# Patient Record
Sex: MALE | Race: WHITE | Employment: FULL TIME | URBAN - METROPOLITAN AREA
[De-identification: names, ages, dates, MRNs, and addresses within clinical notes are randomized per-mention and may not be internally consistent; named-entity substitution may affect disease eponyms.]

---

## 2018-05-01 ENCOUNTER — OFFICE VISIT (OUTPATIENT)
Dept: FAMILY MEDICINE CLINIC | Age: 63
End: 2018-05-01

## 2018-05-01 VITALS
OXYGEN SATURATION: 96 % | BODY MASS INDEX: 35.08 KG/M2 | SYSTOLIC BLOOD PRESSURE: 158 MMHG | RESPIRATION RATE: 19 BRPM | HEIGHT: 72 IN | HEART RATE: 68 BPM | DIASTOLIC BLOOD PRESSURE: 85 MMHG | TEMPERATURE: 97.3 F | WEIGHT: 259 LBS

## 2018-05-01 DIAGNOSIS — E78.5 HYPERLIPIDEMIA, UNSPECIFIED HYPERLIPIDEMIA TYPE: ICD-10-CM

## 2018-05-01 DIAGNOSIS — E11.8 TYPE 2 DIABETES MELLITUS WITH COMPLICATION, WITHOUT LONG-TERM CURRENT USE OF INSULIN (HCC): Primary | ICD-10-CM

## 2018-05-01 DIAGNOSIS — I10 ESSENTIAL HYPERTENSION: ICD-10-CM

## 2018-05-01 RX ORDER — CHOLECALCIFEROL (VITAMIN D3) 125 MCG
CAPSULE ORAL
COMMUNITY
Start: 2018-04-16

## 2018-05-01 RX ORDER — LANCETS 33 GAUGE
EACH MISCELLANEOUS
Refills: 1 | COMMUNITY
Start: 2018-02-27

## 2018-05-01 RX ORDER — ATORVASTATIN CALCIUM 20 MG/1
TABLET, FILM COATED ORAL
Refills: 0 | COMMUNITY
Start: 2018-04-18 | End: 2018-05-10 | Stop reason: SDUPTHER

## 2018-05-01 RX ORDER — LISINOPRIL 10 MG/1
TABLET ORAL DAILY
COMMUNITY
End: 2018-05-01 | Stop reason: SDUPTHER

## 2018-05-01 RX ORDER — BLOOD-GLUCOSE METER
KIT MISCELLANEOUS
Refills: 0 | COMMUNITY
Start: 2018-02-26

## 2018-05-01 RX ORDER — BLOOD SUGAR DIAGNOSTIC
STRIP MISCELLANEOUS
Refills: 3 | COMMUNITY
Start: 2018-02-26

## 2018-05-01 RX ORDER — CICLOPIROX 80 MG/ML
SOLUTION TOPICAL AS NEEDED
Refills: 3 | COMMUNITY
Start: 2018-02-26

## 2018-05-01 RX ORDER — GLYBURIDE-METFORMIN HYDROCHLORIDE 5; 500 MG/1; MG/1
1 TABLET ORAL
COMMUNITY
End: 2018-09-04 | Stop reason: SDUPTHER

## 2018-05-01 RX ORDER — LISINOPRIL 10 MG/1
TABLET ORAL
COMMUNITY
Start: 2018-04-16 | End: 2018-05-10 | Stop reason: SDUPTHER

## 2018-05-01 NOTE — PROGRESS NOTES
1. Have you been to the ER, urgent care clinic since your last visit? Hospitalized since your last visit? No    2. Have you seen or consulted any other health care providers outside of the [address] [place] [address] since your last visit? Include any pap smears or colon screening. [address] Chief Complaint   Patient presents with   [address]    Cholesterol Problem    Blood sugar problem    Sleep Apnea    Hypertension     Blood pressure 154/79, pulse 71, temperature 97.3 °F (36.3 °C), temperature source Oral, resp. rate 19, height 6' (1.829 m), weight 259 lb (117.5 kg), SpO2 96 %. Recheck of B/P 158/85, pulse 68.

## 2018-05-01 NOTE — MR AVS SNAPSHOT
[address] [id number] Patient: Elmer Carl MRN: [id number] :1955 Visit Information Date & Time Provider Department Dept. Phone Encounter #  
 2018  9:45 AM Esteban Maguire MD [address] [facility] [phone] [id number] Upcoming Health Maintenance Date Due Hepatitis C Screening 1955 HEMOGLOBIN A1C Q6M 1955 LIPID PANEL Q1 1955 FOOT EXAM Q1 1964 MICROALBUMIN Q1 1964 EYE EXAM RETINAL OR DILATED Q1 1964 Pneumococcal 19-64 Medium Risk (1 of 1 - PPSV23) 1973 DTaP/Tdap/Td series (1 - Tdap) 1975 FOBT Q 1 YEAR AGE 50-75 2004 ZOSTER VACCINE AGE 60> 2014 Influenza Age 5 to Adult 2018 Allergies as of 2018  Review Complete On: 2018 By: Julian Moran LPN No Known Allergies Current Immunizations  Never Reviewed No immunizations on file. Not reviewed this visit You Were Diagnosed With   
  
 Codes Comments Type 2 diabetes mellitus with complication, without long-term current use of insulin (HCC)    -  Primary ICD-10-CM: E11.8 ICD-9-CM: 250.90 Essential hypertension     ICD-10-CM: I10 
ICD-9-CM: 401.9 Hyperlipidemia, unspecified hyperlipidemia type     ICD-10-CM: E78.5 ICD-9-CM: 272.4 Vitals BP Pulse Temp Resp Height(growth percentile) Weight(growth percentile) 158/85 (BP 1 Location: Left arm, BP Patient Position: Sitting) 68 97.3 °F (36.3 °C) (Oral) 19 6' (1.829 m) 259 lb (117.5 kg) SpO2 BMI Smoking Status 96% 35.13 kg/m2 Former Smoker Vitals History BMI and BSA Data Body Mass Index Body Surface Area  
 35.13 kg/m 2 2.44 m 2 Preferred Pharmacy Pharmacy Name Phone [facility] [phone] Your Updated Medication List  
  
   
This list is accurate as of 5/1/18 10:57 AM.  Always use your most recent med list.  
  
  
  
  
 BABY ASPIRIN PO Take 81 mg by mouth daily. COQ10 (LIPOSOMAL UBIQUINOL) PO Take  by mouth. COQ10  PO Take  by mouth. FISH OIL PO Take 1 g by mouth daily. glimepiride 2 mg tablet Commonly known as:  AMARYL Take  by mouth every morning. glyBURIDE-metFORMIN 5-500 mg per tablet Commonly known as:  [place] Take 1 Tab by mouth Daily (before breakfast). LIPITOR 40 mg tablet Generic drug:  atorvastatin Take 20 mg by mouth daily. lisinopril 10 mg tablet Commonly known as:  [place]on Estrin Take  by mouth daily. OMEGA 3 PO Take  by mouth. oxyCODONE-acetaminophen 5-325 mg per tablet Commonly known as:  PERCOCET Take 1 tablet by mouth every four (4) hours as needed for Pain. We Performed the Following CBC W/O DIFF [42292 CPT(R)] LIPID PANEL [61294 CPT(R)] METABOLIC PANEL, COMPREHENSIVE [85202 CPT(R)] Patient Instructions 1. Please make an appointment for a lab only visit for fasting labs 2. Please schedule an appointment ~ 3-4 days after your lab only visit to discuss your labs DASH Diet: Care Instructions Your Care Instructions The DASH diet is an eating plan that can help lower your blood pressure. DASH stands for Dietary Approaches to Stop Hypertension. Hypertension is high blood pressure. The DASH diet focuses on eating foods that are high in calcium, potassium, and magnesium. These nutrients can lower blood pressure. The foods that are highest in these nutrients are fruits, vegetables, low-fat dairy products, nuts, seeds, and legumes. But taking calcium, potassium, and magnesium supplements instead of eating foods that are high in those nutrients does not have the same effect. The DASH diet also includes whole grains, fish, and poultry. The DASH diet is one of several lifestyle changes your doctor may recommend to lower your high blood pressure. Your doctor may also want you to decrease the amount of sodium in your diet. Lowering sodium while following the DASH diet can lower blood pressure even further than just the DASH diet alone. Follow-up care is a key part of your treatment and safety. Be sure to make and go to all appointments, and call your doctor if you are having problems. It's also a good idea to know your test results and keep a list of the medicines you take. How can you care for yourself at home? Following the DASH diet · Eat 4 to 5 servings of fruit each day. A serving is 1 medium-sized piece of fruit, ½ cup chopped or canned fruit, 1/4 cup dried fruit, or 4 ounces (½ cup) of fruit juice. Choose fruit more often than fruit juice. · Eat 4 to 5 servings of vegetables each day. A serving is 1 cup of lettuce or raw leafy vegetables, ½ cup of chopped or cooked vegetables, or 4 ounces (½ cup) of vegetable juice. Choose vegetables more often than vegetable juice. · Get 2 to 3 servings of low-fat and fat-free dairy each day. A serving is 8 ounces of milk, 1 cup of yogurt, or 1 ½ ounces of cheese. · Eat 6 to 8 servings of grains each day. A serving is 1 slice of bread, 1 ounce of dry cereal, or ½ cup of cooked rice, pasta, or cooked cereal. Try to choose whole-grain products as much as possible. · Limit lean meat, poultry, and fish to 2 servings each day. A serving is 3 ounces, about the size of a deck of cards. · Eat 4 to 5 servings of nuts, seeds, and legumes (cooked dried beans, lentils, and split peas) each week. A serving is 1/3 cup of nuts, 2 tablespoons of seeds, or ½ cup of cooked beans or peas. · Limit fats and oils to 2 to 3 servings each day. A serving is 1 teaspoon of vegetable oil or 2 tablespoons of salad dressing. · Limit sweets and added sugars to 5 servings or less a week.  A serving is 1 tablespoon jelly or jam, ½ cup sorbet, or 1 cup of lemonade. · Eat less than 2,300 milligrams (mg) of sodium a day. If you limit your sodium to 1,500 mg a day, you can lower your blood pressure even more. Tips for success · Start small. Do not try to make dramatic changes to your diet all at once. You might feel that you are missing out on your favorite foods and then be more likely to not follow the plan. Make small changes, and stick with them. Once those changes become habit, add a few more changes. · Try some of the following: ¨ Make it a goal to eat a fruit or vegetable at every meal and at snacks. This will make it easy to get the recommended amount of fruits and vegetables each day. ¨ Try yogurt topped with fruit and nuts for a snack or healthy dessert. ¨ Add lettuce, tomato, cucumber, and onion to sandwiches. ¨ Combine a ready-made pizza crust with low-fat mozzarella cheese and lots of vegetable toppings. Try using tomatoes, squash, spinach, broccoli, carrots, cauliflower, and onions. ¨ Have a variety of cut-up vegetables with a low-fat dip as an appetizer instead of chips and dip. ¨ Sprinkle sunflower seeds or chopped almonds over salads. Or try adding chopped walnuts or almonds to cooked vegetables. ¨ Try some vegetarian meals using beans and peas. Add garbanzo or kidney beans to salads. Make burritos and tacos with mashed stanley beans or black beans. Where can you learn more? Go to http://roberto carlos-chidi.info/. Enter Y312 in the search box to learn more about \"DASH Diet: Care Instructions. \" Current as of: September 21, 2016 Content Version: 11.4 © 3549-5192 [employer]. Care instructions adapted under license by [employer] (which disclaims liability or warranty for this information).  If you have questions about a medical condition or this instruction, always ask your healthcare professional. [employer] disclaims any warranty or liability for your use of this information. Introducing [facility]! [facility] introduces Dragon Tail patient portal. Now you can access parts of your medical record, email your doctor's office, and request medication refills online. 1. In your internet browser, go to [email or web address] 2. Click on the First Time User? Click Here link in the Sign In box. You will see the New Member Sign Up page. 3. Enter your Dragon Tail Access Code exactly as it appears below. You will not need to use this code after youve completed the sign-up process. If you do not sign up before the expiration date, you must request a new code. · Dragon Tail Access Code: [email or web address] Expires: 7/30/2018 10:57 AM 
 
4. Enter the last four digits of your Social Security Number (xxxx) and Date of Birth (mm/dd/yyyy) as indicated and click Submit. You will be taken to the next sign-up page. 5. Create a Dragon Tail ID. This will be your Dragon Tail login ID and cannot be changed, so think of one that is secure and easy to remember. 6. Create a Dragon Tail password. You can change your password at any time. 7. Enter your Password Reset Question and Answer. This can be used at a later time if you forget your password. 8. Enter your e-mail address. You will receive e-mail notification when new information is available in [address] 9. Click Sign Up. You can now view and download portions of your medical record. 10. Click the Download Summary menu link to download a portable copy of your medical information. If you have questions, please visit the Frequently Asked Questions section of the Dragon Tail website. Remember, Dragon Tail is NOT to be used for urgent needs. For medical emergencies, dial 911. Now available from your iPhone and Android! Please provide this summary of care documentation to your next provider. Your primary care clinician is listed as NONE. If you have any questions after today's visit, please call [phone].

## 2018-05-01 NOTE — PATIENT INSTRUCTIONS
1. Please make an appointment for a lab only visit for fasting labs  2. Please schedule an appointment ~ 3-4 days after your lab only visit to discuss your labs         DASH Diet: Care Instructions  Your Care Instructions    The DASH diet is an eating plan that can help lower your blood pressure. DASH stands for Dietary Approaches to Stop Hypertension. Hypertension is high blood pressure. The DASH diet focuses on eating foods that are high in calcium, potassium, and magnesium. These nutrients can lower blood pressure. The foods that are highest in these nutrients are fruits, vegetables, low-fat dairy products, nuts, seeds, and legumes. But taking calcium, potassium, and magnesium supplements instead of eating foods that are high in those nutrients does not have the same effect. The DASH diet also includes whole grains, fish, and poultry. The DASH diet is one of several lifestyle changes your doctor may recommend to lower your high blood pressure. Your doctor may also want you to decrease the amount of sodium in your diet. Lowering sodium while following the DASH diet can lower blood pressure even further than just the DASH diet alone. Follow-up care is a key part of your treatment and safety. Be sure to make and go to all appointments, and call your doctor if you are having problems. It's also a good idea to know your test results and keep a list of the medicines you take. How can you care for yourself at home? Following the DASH diet  · Eat 4 to 5 servings of fruit each day. A serving is 1 medium-sized piece of fruit, ½ cup chopped or canned fruit, 1/4 cup dried fruit, or 4 ounces (½ cup) of fruit juice. Choose fruit more often than fruit juice. · Eat 4 to 5 servings of vegetables each day. A serving is 1 cup of lettuce or raw leafy vegetables, ½ cup of chopped or cooked vegetables, or 4 ounces (½ cup) of vegetable juice. Choose vegetables more often than vegetable juice.   · Get 2 to 3 servings of low-fat and fat-free dairy each day. A serving is 8 ounces of milk, 1 cup of yogurt, or 1 ½ ounces of cheese. · Eat 6 to 8 servings of grains each day. A serving is 1 slice of bread, 1 ounce of dry cereal, or ½ cup of cooked rice, pasta, or cooked cereal. Try to choose whole-grain products as much as possible. · Limit lean meat, poultry, and fish to 2 servings each day. A serving is 3 ounces, about the size of a deck of cards. · Eat 4 to 5 servings of nuts, seeds, and legumes (cooked dried beans, lentils, and split peas) each week. A serving is 1/3 cup of nuts, 2 tablespoons of seeds, or ½ cup of cooked beans or peas. · Limit fats and oils to 2 to 3 servings each day. A serving is 1 teaspoon of vegetable oil or 2 tablespoons of salad dressing. · Limit sweets and added sugars to 5 servings or less a week. A serving is 1 tablespoon jelly or jam, ½ cup sorbet, or 1 cup of lemonade. · Eat less than 2,300 milligrams (mg) of sodium a day. If you limit your sodium to 1,500 mg a day, you can lower your blood pressure even more. Tips for success  · Start small. Do not try to make dramatic changes to your diet all at once. You might feel that you are missing out on your favorite foods and then be more likely to not follow the plan. Make small changes, and stick with them. Once those changes become habit, add a few more changes. · Try some of the following:  ¨ Make it a goal to eat a fruit or vegetable at every meal and at snacks. This will make it easy to get the recommended amount of fruits and vegetables each day. ¨ Try yogurt topped with fruit and nuts for a snack or healthy dessert. ¨ Add lettuce, tomato, cucumber, and onion to sandwiches. ¨ Combine a ready-made pizza crust with low-fat mozzarella cheese and lots of vegetable toppings. Try using tomatoes, squash, spinach, broccoli, carrots, cauliflower, and onions.   ¨ Have a variety of cut-up vegetables with a low-fat dip as an appetizer instead of chips and dip.  ¨ Sprinkle sunflower seeds or chopped almonds over salads. Or try adding chopped walnuts or almonds to cooked vegetables. ¨ Try some vegetarian meals using beans and peas. Add garbanzo or kidney beans to salads. Make burritos and tacos with mashed stanley beans or black beans. Where can you learn more? Go to http://roberto carlos-chidi.info/. Enter O700 in the search box to learn more about \"DASH Diet: Care Instructions. \"  Current as of: September 21, 2016  Content Version: 11.4  © 4674-6949 [email or web address]. Care instructions adapted under license by [employer] (which disclaims liability or warranty for this information). If you have questions about a medical condition or this instruction, always ask your healthcare professional. [address] any warranty or liability for your use of this information.

## 2018-05-01 NOTE — PROGRESS NOTES
HPI       Chief Complaint: HTN, HLD, DMII    Ishan Morrison is a 60 y.o. male who presents to [facility], wife was with [employer], has now switched to [employer]. Has new insurance. Reports he has changed insurance multiple times in the past, has been unable to stay with one physician for more than a few years due to insurance. Has no specific complaints today. 1. Hypertension - 154/79 today --> 158/85. Taking lisinopril 10mg daily, ASA 81mg. BP normally 140s-150s/70s. If he's been exercising, is usually 130s/70s. Checks his BPs at home about 3x/week. BP is usually better in the afternoons. No HA, dizziness, N/V, SOB, CP. Plays a lot of golf. 2. HLD -1/19/2018 -  Tchol 176, HDL 39, Trig 218, . Taking Atorvastatin 20mg daily. Previously on prescription dosage Omega 3, but now not covered by new insurance. Is not fasting today. 3. DMII - 1/19/2018 - A1c 6.7. Urine microalbumin <30 in 10/2017. Is taking glyburide/metformin 5/500mg BID, has been on this for ~ 1 year. Does not see endocrinologist. Has never been on insulin. Checks his BG at home - checks mid-morning about 1 hour after breakfast, or after dinner. Used to keep a diary but has not been the past few months. Diet is \"normally pretty good\" with occasional pizza. Eye exam ~ 1 month ago by his optometrist.     4. Health Maintenance  - Colonoscopy - Last done summer 2017, states they found polyps and he receives colonoscopies every 5 years. - Vaccines? - not sure. Received flu vaccine this year.      PMHx:  Past Medical History:   Diagnosis Date    Hypercholesterolemia     Hypertension     Nonspecific abnormal electrocardiogram (ECG) (EKG) 3/17/2011    Other dyspnea and respiratory abnormality 3/17/2011    Pre-operative cardiovascular examination 3/17/2011    Type II or unspecified type diabetes mellitus without mention of complication, not stated as uncontrolled 3/17/2011     Meds:   Current Outpatient Prescriptions   Medication Sig Dispense Refill    glyBURIDE-metFORMIN (GLUCOVANCE) 5-500 mg per tablet Take 1 Tab by mouth Daily (before breakfast).  BABY ASPIRIN PO Take 81 mg by mouth daily.  docosahexanoic acid/epa (FISH OIL PO) Take 1 g by mouth daily.  co-enzyme Q-10 (CO Q-10) 100 mg capsule TAKE 1 CAPSULE BY MOUTH EVERY DAY BEFORE A MEAL      lisinopril (PRINIVIL, ZESTRIL) 10 mg tablet TAKE 1 TABLET(10 MG) BY MOUTH EVERY DAY      flaxseed oil (OMEGA 3 PO) Take  by mouth.  atorvastatin (LIPITOR) 20 mg tablet TK 1 T PO D  0    ONETOUCH ULTRA TEST strip USE UTD TO CHECK BID  3    ONETOUCH ULTRA2 monitoring kit USE UTD  0    ciclopirox (PENLAC) 8 % solution HOANG AA ONCE DAILY PREFERABLY AT BEDTIME OR 8 HOURS BEFORE WASHING  3    ONE TOUCH DELICA 33 gauge misc USE FOR TESTING  1    oxycodone-acetaminophen (PERCOCET) 5-325 mg per tablet Take 1 tablet by mouth every four (4) hours as needed for Pain. 15 tablet 0     Allergies:   No Known Allergies    Smoker:  History   Smoking Status    Former Smoker   Smokeless Tobacco    Never Used     ETOH:   History   Alcohol Use    Yes     Comment: ocasional     FH:   Family History   Problem Relation Age of Onset    Elevated Lipids Father        ROS  Review of Systems   Constitutional: Negative for chills, fever and weight loss. HENT: Negative for congestion, sinus pain and sore throat. Eyes: Negative for blurred vision and double vision. Respiratory: Negative for cough, shortness of breath and wheezing. Cardiovascular: Negative for chest pain and palpitations. Gastrointestinal: Negative for abdominal pain, constipation, diarrhea, nausea and vomiting. Genitourinary: Negative for dysuria, frequency and urgency. Neurological: Negative for dizziness, weakness and headaches.        Physical Exam:  Visit Vitals    /85 (BP 1 Location: Left arm, BP Patient Position: Sitting)    Pulse 68    Temp 97.3 °F (36.3 °C) (Oral)    Resp 19    Ht 6' (1.829 m)    Wt 259 lb (117.5 kg)    SpO2 96%    BMI 35.13 kg/m2       Wt Readings from Last 3 Encounters:   05/01/18 259 lb (117.5 kg)   09/11/14 247 lb 4.8 oz (112.2 kg)     BP Readings from Last 3 Encounters:   05/01/18 158/85   09/11/14 133/84      Physical Exam   Constitutional: He is oriented to person, place, and time. He appears well-developed and well-nourished. HENT:   Head: Normocephalic and atraumatic. Eyes: EOM are normal. Pupils are equal, round, and reactive to light. Neck: Neck supple. No thyromegaly present. Cardiovascular: Normal rate, regular rhythm, normal heart sounds and intact distal pulses. Pulmonary/Chest: Effort normal and breath sounds normal.   Abdominal: Soft. Bowel sounds are normal.   Neurological: He is alert and oriented to person, place, and time. Skin: Skin is warm and dry. I personally reviewed the following lab results:   No results found for this or any previous visit (from the past 12 hour(s)). Assessment     60 y.o. male with:    ICD-10-CM ICD-9-CM    1. Type 2 diabetes mellitus with complication, without long-term current use of insulin (HCC) E11.8 250.90 CBC W/O DIFF      METABOLIC PANEL, COMPREHENSIVE      LIPID PANEL   2. Essential hypertension I10 401.9 CBC W/O DIFF      METABOLIC PANEL, COMPREHENSIVE      LIPID PANEL   3. Hyperlipidemia, unspecified hyperlipidemia type E78.5 272.4 CBC W/O DIFF      METABOLIC PANEL, COMPREHENSIVE      LIPID PANEL              Plan       Orders Placed This Encounter    CBC W/O DIFF    METABOLIC PANEL, COMPREHENSIVE    LIPID PANEL     1. Hypertension - Continue lisinopril 10mg daily, ASA 81mg  - Continue home BP checks  - CBC, CMP    2. HLD - Continue lipitor 20mg daily  - Patient to return for fasting lipid panel, can consider increasing lipitor pending results    3.  DMII - Continue glyburide/metformin 5/500 BID  - 10/25/17: Urine microalbumin <30  - Continue daily home BG checks, patient to restart keeping BG diary  - Will need to discuss diabetic foot at next visit. Last eye exam was 3/2018. 4. Health maintenance  - Next colonoscopy 2022    Patient to f/u after lab only visit to discuss medications and refills. Patient to obtain outside records in the meantime. If Hep C screening, Tdap, PPSV23, VZV not in outside records, will discuss at next visit. Patient discussed with Dr. Osbaldo Harper    I have discussed the diagnosis with the patient and the intended plan as seen in the above orders. The patient has received an after-visit summary and questions were answered concerning future plans. I have discussed medication side effects and warnings with the patient as well.     Jenifer Rose MD  Family Medicine Resident  PGY-1

## 2018-05-04 ENCOUNTER — TELEPHONE (OUTPATIENT)
Dept: FAMILY MEDICINE CLINIC | Age: 63
End: 2018-05-04

## 2018-05-04 NOTE — TELEPHONE ENCOUNTER
----- Message from Casper Lang sent at 5/4/2018  1:50 PM EDT -----  Regarding: Dr. Shira Perdomo returning call from [facility] from practice. Pts best contact number [phone].

## 2018-05-05 LAB
ALBUMIN SERPL-MCNC: 4.3 G/DL (ref 3.6–4.8)
ALBUMIN/GLOB SERPL: 1.7 {RATIO} (ref 1.2–2.2)
ALP SERPL-CCNC: 57 IU/L (ref 39–117)
ALT SERPL-CCNC: 57 IU/L (ref 0–44)
AST SERPL-CCNC: 45 IU/L (ref 0–40)
BILIRUB SERPL-MCNC: 0.5 MG/DL (ref 0–1.2)
BUN SERPL-MCNC: 22 MG/DL (ref 8–27)
BUN/CREAT SERPL: 18 (ref 10–24)
CALCIUM SERPL-MCNC: 9.1 MG/DL (ref 8.6–10.2)
CHLORIDE SERPL-SCNC: 100 MMOL/L (ref 96–106)
CHOLEST SERPL-MCNC: 175 MG/DL (ref 100–199)
CO2 SERPL-SCNC: 22 MMOL/L (ref 18–29)
CREAT SERPL-MCNC: 1.21 MG/DL (ref 0.76–1.27)
ERYTHROCYTE [DISTWIDTH] IN BLOOD BY AUTOMATED COUNT: 14 % (ref 12.3–15.4)
GFR SERPLBLD CREATININE-BSD FMLA CKD-EPI: 63 ML/MIN/1.73
GFR SERPLBLD CREATININE-BSD FMLA CKD-EPI: 73 ML/MIN/1.73
GLOBULIN SER CALC-MCNC: 2.5 G/DL (ref 1.5–4.5)
GLUCOSE SERPL-MCNC: 139 MG/DL (ref 65–99)
HCT VFR BLD AUTO: 47.3 % (ref 37.5–51)
HDLC SERPL-MCNC: 43 MG/DL
HGB BLD-MCNC: 15.2 G/DL (ref 13–17.7)
INTERPRETATION, 910389: NORMAL
LDLC SERPL CALC-MCNC: 104 MG/DL (ref 0–99)
Lab: NORMAL
MCH RBC QN AUTO: 29 PG (ref 26.6–33)
MCHC RBC AUTO-ENTMCNC: 32.1 G/DL (ref 31.5–35.7)
MCV RBC AUTO: 90 FL (ref 79–97)
PLATELET # BLD AUTO: 179 X10E3/UL (ref 150–379)
POTASSIUM SERPL-SCNC: 4.7 MMOL/L (ref 3.5–5.2)
PROT SERPL-MCNC: 6.8 G/DL (ref 6–8.5)
RBC # BLD AUTO: 5.24 X10E6/UL (ref 4.14–5.8)
SODIUM SERPL-SCNC: 138 MMOL/L (ref 134–144)
TRIGL SERPL-MCNC: 141 MG/DL (ref 0–149)
VLDLC SERPL CALC-MCNC: 28 MG/DL (ref 5–40)
WBC # BLD AUTO: 7.1 X10E3/UL (ref 3.4–10.8)

## 2018-05-07 NOTE — TELEPHONE ENCOUNTER
Returned patient call back per his nose bleed the day before his lab only appointment. Patient states he just wanted Dr Sathya Valencia to know so they could address it at his next appointment with her on May 10, 2018 with Dr Sathya Valencia. Informed patient I will send a message to Dr Sathya Valencia to let her know of your concerns on issues you would like to address during the visit. Patient verbalized understanding and agreement. Routed message to Dr Sathya Valencia.

## 2018-05-10 ENCOUNTER — OFFICE VISIT (OUTPATIENT)
Dept: FAMILY MEDICINE CLINIC | Age: 63
End: 2018-05-10

## 2018-05-10 ENCOUNTER — TELEPHONE (OUTPATIENT)
Dept: FAMILY MEDICINE CLINIC | Age: 63
End: 2018-05-10

## 2018-05-10 VITALS
HEIGHT: 72 IN | DIASTOLIC BLOOD PRESSURE: 71 MMHG | WEIGHT: 257 LBS | RESPIRATION RATE: 18 BRPM | HEART RATE: 60 BPM | TEMPERATURE: 98.1 F | BODY MASS INDEX: 34.81 KG/M2 | OXYGEN SATURATION: 97 % | SYSTOLIC BLOOD PRESSURE: 137 MMHG

## 2018-05-10 DIAGNOSIS — E78.2 MIXED HYPERLIPIDEMIA: ICD-10-CM

## 2018-05-10 DIAGNOSIS — Z23 ENCOUNTER FOR IMMUNIZATION: Primary | ICD-10-CM

## 2018-05-10 DIAGNOSIS — I10 ESSENTIAL HYPERTENSION: ICD-10-CM

## 2018-05-10 DIAGNOSIS — E11.8 TYPE 2 DIABETES MELLITUS WITH COMPLICATION, WITHOUT LONG-TERM CURRENT USE OF INSULIN (HCC): ICD-10-CM

## 2018-05-10 PROBLEM — R06.02 SOB (SHORTNESS OF BREATH) ON EXERTION: Chronic | Status: RESOLVED | Noted: 2018-05-10 | Resolved: 2018-05-10

## 2018-05-10 PROBLEM — R06.02 SOB (SHORTNESS OF BREATH) ON EXERTION: Chronic | Status: ACTIVE | Noted: 2018-05-10

## 2018-05-10 RX ORDER — LISINOPRIL 10 MG/1
TABLET ORAL
Qty: 90 TAB | Refills: 2 | Status: SHIPPED | OUTPATIENT
Start: 2018-05-10 | End: 2019-02-05 | Stop reason: SDUPTHER

## 2018-05-10 RX ORDER — ATORVASTATIN CALCIUM 40 MG/1
TABLET, FILM COATED ORAL
Qty: 90 TAB | Refills: 2 | Status: SHIPPED | OUTPATIENT
Start: 2018-05-10 | End: 2018-12-12 | Stop reason: SDUPTHER

## 2018-05-10 NOTE — PROGRESS NOTES
Chief Complaint   Patient presents with    Labs     discuss lab results     1. Have you been to the ER, urgent care clinic since your last visit? Hospitalized since your last visit? No    2. Have you seen or consulted any other health care providers outside of the [address] [place] [address] since your last visit? Include any pap smears or colon screening.  No

## 2018-05-10 NOTE — MR AVS SNAPSHOT
[address] [phone] Patient: Sean Prasad MRN: [id number] :1955 Visit Information Date & Time Provider Department Dept. Phone Encounter #  
 5/10/2018  8:45 AM Brianna Lanier MD [address] [facility] [phone] [id number] Upcoming Health Maintenance Date Due Hepatitis C Screening 1955 FOOT EXAM Q1 1964 EYE EXAM RETINAL OR DILATED Q1 1964 Pneumococcal 19-64 Medium Risk (1 of 1 - PPSV23) 1973 DTaP/Tdap/Td series (1 - Tdap) 1975 FOBT Q 1 YEAR AGE 50-75 2004 ZOSTER VACCINE AGE 60> 2014 HEMOGLOBIN A1C Q6M 2018 Influenza Age 5 to Adult 2018 MICROALBUMIN Q1 10/25/2018 LIPID PANEL Q1 2019 Allergies as of 5/10/2018  Review Complete On: 5/10/2018 By: Oliva Reese LPN No Known Allergies Current Immunizations  Never Reviewed Name Date Influenza Vaccine 10/25/2017 12:00 AM  
 Pneumococcal Polysaccharide (PPSV-23)  Incomplete Tdap  Incomplete Not reviewed this visit You Were Diagnosed With   
  
 Codes Comments Mixed hyperlipidemia    -  Primary ICD-10-CM: I68.2 ICD-9-CM: 272.2 Essential hypertension     ICD-10-CM: I10 
ICD-9-CM: 401.9 Need for hepatitis C screening test     ICD-10-CM: Z11.59 
ICD-9-CM: V73.89 Encounter for immunization     ICD-10-CM: W69 ICD-9-CM: V03.89 Vitals BP Pulse Temp Resp Height(growth percentile) Weight(growth percentile)  
 137/71 60 98.1 °F (36.7 °C) (Oral) 18 6' (1.829 m) 257 lb (116.6 kg) SpO2 BMI Smoking Status 97% 34.86 kg/m2 Former Smoker Vitals History BMI and BSA Data Body Mass Index Body Surface Area 34.86 kg/m 2 2.43 m 2 Preferred Pharmacy Pharmacy Name Phone  [address] [phone] Your Updated Medication List  
  
   
 This list is accurate as of 5/10/18  9:29 AM.  Always use your most recent med list.  
  
  
  
  
 atorvastatin 40 mg tablet Commonly known as:  LIPITOR Take 1 tablet daily by mouth BABY ASPIRIN PO Take 81 mg by mouth daily. ciclopirox 8 % solution Commonly known as:  PENLAC  
HOANG AA ONCE DAILY PREFERABLY AT BEDTIME OR 8 HOURS BEFORE WASHING  
  
 co-enzyme Q-10 100 mg capsule Commonly known as:  CO Q-10  
TAKE 1 CAPSULE BY MOUTH EVERY DAY BEFORE A MEAL  
  
 FISH OIL PO Take 1 g by mouth daily. glyBURIDE-metFORMIN 5-500 mg per tablet Commonly known as:  Dusty Bee Take 1 Tab by mouth Daily (before breakfast). lisinopril 10 mg tablet Commonly known as:  PRINIVIL, ZESTRIL  
TAKE 1 TABLET(10 MG) BY MOUTH EVERY DAY  
  
 OMEGA 3 PO Take  by mouth. One Touch Delica 33 gauge Misc Generic drug:  lancets USE FOR TESTING  
  
 ONETOUCH ULTRA TEST strip Generic drug:  glucose blood VI test strips USE UTD TO CHECK BID  
  
 ONETOUCH ULTRA2 monitoring kit Generic drug:  Blood-Glucose Meter USE UTD  
  
 oxyCODONE-acetaminophen 5-325 mg per tablet Commonly known as:  PERCOCET Take 1 tablet by mouth every four (4) hours as needed for Pain. Prescriptions Sent to Pharmacy Refills  
 lisinopril (PRINIVIL, ZESTRIL) 10 mg tablet 2 Sig: TAKE 1 TABLET(10 MG) BY MOUTH EVERY DAY Class: Normal  
 Pharmacy:  [address] Ph #: [phone]  
 atorvastatin (LIPITOR) 40 mg tablet 2 Sig: Take 1 tablet daily by mouth Class: Normal  
 Pharmacy: [address] Ph #: [phone] We Performed the Following HEPATITIS C AB [37820 CPT(R)] PNEUMOCOCCAL POLYSACCHARIDE VACCINE, 23-VALENT, ADULT OR IMMUNOSUPPRESSED PT DOSE, [67565 CPT(R)] TETANUS, DIPHTHERIA TOXOIDS AND ACELLULAR PERTUSSIS VACCINE (TDAP), IN INDIVIDS. >=7, IM [id number] CPT(R)] Patient Instructions Learning About Diabetes Food Guidelines Your Care Instructions Meal planning is important to manage diabetes. It helps keep your blood sugar at a target level (which you set with your doctor). You don't have to eat special foods. You can eat what your family eats, including sweets once in a while. But you do have to pay attention to how often you eat and how much you eat of certain foods. You may want to work with a dietitian or a certified diabetes educator (CDE) to help you plan meals and snacks. A dietitian or CDE can also help you lose weight if that is one of your goals. What should you know about eating carbs? Managing the amount of carbohydrate (carbs) you eat is an important part of healthy meals when you have diabetes. Carbohydrate is found in many foods. · Learn which foods have carbs. And learn the amounts of carbs in different foods. ¨ Bread, cereal, pasta, and rice have about 15 grams of carbs in a serving. A serving is 1 slice of bread (1 ounce), ½ cup of cooked cereal, or 1/3 cup of cooked pasta or rice. ¨ Fruits have 15 grams of carbs in a serving. A serving is 1 small fresh fruit, such as an apple or orange; ½ of a banana; ½ cup of cooked or canned fruit; ½ cup of fruit juice; 1 cup of melon or raspberries; or 2 tablespoons of dried fruit. ¨ Milk and no-sugar-added yogurt have 15 grams of carbs in a serving. A serving is 1 cup of milk or 2/3 cup of no-sugar-added yogurt. ¨ Starchy vegetables have 15 grams of carbs in a serving. A serving is ½ cup of mashed potatoes or sweet potato; 1 cup winter squash; ½ of a small baked potato; ½ cup of cooked beans; or ½ cup cooked corn or green peas. · Learn how much carbs to eat each day and at each meal. A dietitian or CDE can teach you how to keep track of the amount of carbs you eat. This is called carbohydrate counting.  
· If you are not sure how to count carbohydrate grams, use the Plate Method to plan meals. It is a good, quick way to make sure that you have a balanced meal. It also helps you spread carbs throughout the day. ¨ Divide your plate by types of foods. Put non-starchy vegetables on half the plate, meat or other protein food on one-quarter of the plate, and a grain or starchy vegetable in the final quarter of the plate. To this you can add a small piece of fruit and 1 cup of milk or yogurt, depending on how many carbs you are supposed to eat at a meal. 
· Try to eat about the same amount of carbs at each meal. Do not \"save up\" your daily allowance of carbs to eat at one meal. 
· Proteins have very little or no carbs per serving. Examples of proteins are beef, chicken, turkey, fish, eggs, tofu, cheese, cottage cheese, and peanut butter. A serving size of meat is 3 ounces, which is about the size of a deck of cards. Examples of meat substitute serving sizes (equal to 1 ounce of meat) are 1/4 cup of cottage cheese, 1 egg, 1 tablespoon of peanut butter, and ½ cup of tofu. How can you eat out and still eat healthy? · Learn to estimate the serving sizes of foods that have carbohydrate. If you measure food at home, it will be easier to estimate the amount in a serving of restaurant food. · If the meal you order has too much carbohydrate (such as potatoes, corn, or baked beans), ask to have a low-carbohydrate food instead. Ask for a salad or green vegetables. · If you use insulin, check your blood sugar before and after eating out to help you plan how much to eat in the future. · If you eat more carbohydrate at a meal than you had planned, take a walk or do other exercise. This will help lower your blood sugar. What else should you know? · Limit saturated fat, such as the fat from meat and dairy products. This is a healthy choice because people who have diabetes are at higher risk of heart disease.  So choose lean cuts of meat and nonfat or low-fat dairy products. Use olive or canola oil instead of butter or shortening when cooking. · Don't skip meals. Your blood sugar may drop too low if you skip meals and take insulin or certain medicines for diabetes. · Check with your doctor before you drink alcohol. Alcohol can cause your blood sugar to drop too low. Alcohol can also cause a bad reaction if you take certain diabetes medicines. Follow-up care is a key part of your treatment and safety. Be sure to make and go to all appointments, and call your doctor if you are having problems. It's also a good idea to know your test results and keep a list of the medicines you take. Where can you learn more? Go to http://roberto carlos-chidi.info/. Enter F558 in the search box to learn more about \"Learning About Diabetes Food Guidelines. \" Current as of: March 13, 2017 Content Version: 11.4 © 7986-8094 pijajo.com. Care instructions adapted under license by [employer] (which disclaims liability or warranty for this information). If you have questions about a medical condition or this instruction, always ask your healthcare professional. [address] any warranty or liability for your use of this information. Hepatitis C Virus Tests: About These Tests What are they? Hepatitis C virus tests are blood tests that check for substances in the blood that show whether you have hepatitis C now or had it in the past. The tests can also tell you what type of hepatitis C you have and how severe the disease is. Why are these tests done? You may need these tests if: 
· You have symptoms of hepatitis. · You may have been exposed to the virus. You are more likely to have been exposed to the virus if you inject drugs or are exposed to body fluids (such as if you are a health care worker). · You have had other tests that show you have liver problems. · You were born between [facility]. People in this age group are more likely to have hepatitis C and not know it. · You have HIV infection. The tests also are done to help your doctor decide about your treatment and see how well it works. How can you prepare for these tests? You do not need to do anything before you have these tests. What happens during these tests? A health professional takes a sample of your blood. What else should you know about these tests? · The tests can tell your doctor what type of hepatitis C you have and how severe it is. This can help your doctor determine treatment and see how effective it is. · If you have the tests soon after you were infected with hepatitis C, they may show that you do not have the disease even when you have it. This is because the substances that show you have hepatitis C can take weeks or months to develop, so they may not be in your blood yet. Your doctor may want you to be tested again. · If the tests show you have long-term hepatitis C, you need to take steps to prevent spreading the disease. What happens after these tests? · You will probably be able to go home right away. · You can go back to your usual activities right away. When should you call for help? Watch closely for changes in your health, and be sure to contact your doctor if you have any problems. Follow-up care is a key part of your treatment and safety. Be sure to make and go to all appointments, and call your doctor if you are having problems. It's also a good idea to keep a list of the medicines you take. Ask your doctor when you can expect to have your test results. Where can you learn more? Go to http://roberto carlos-chidi.info/. Enter U638 in the search box to learn more about \"Hepatitis C Virus Tests: About These Tests. \" Current as of: March 3, 2017 Content Version: 11.4 © 9360-5866 [employer]wise[employer] Incorporated.  Care instructions adapted under license by [address] (which disclaims liability or warranty for this information). If you have questions about a medical condition or this instruction, always ask your healthcare professional. [address] any warranty or liability for your use of this information. Introducing [facility]! [facility] introduces [employer] patient portal. Now you can access parts of your medical record, email your doctor's office, and request medication refills online. 1. In your internet browser, go to [email or web address] 2. Click on the First Time User? Click Here link in the Sign In box. You will see the New Member Sign Up page. 3. Enter your Mercatus Access Code exactly as it appears below. You will not need to use this code after youve completed the sign-up process. If you do not sign up before the expiration date, you must request a new code. · Mercatus Access Code: [email or web address] Expires: 7/30/2018 10:57 AM 
 
4. Enter the last four digits of your Social Security Number (xxxx) and Date of Birth (mm/dd/yyyy) as indicated and click Submit. You will be taken to the next sign-up page. 5. Create a Mercatus ID. This will be your Mercatus login ID and cannot be changed, so think of one that is secure and easy to remember. 6. Create a Mercatus password. You can change your password at any time. 7. Enter your Password Reset Question and Answer. This can be used at a later time if you forget your password. 8. Enter your e-mail address. You will receive e-mail notification when new information is available in [address] 9. Click Sign Up. You can now view and download portions of your medical record. 10. Click the Download Summary menu link to download a portable copy of your medical information. If you have questions, please visit the Frequently Asked Questions section of the Mercatus website.  Remember, Mercatus is NOT to be used for urgent needs. For medical emergencies, dial 911. Now available from your iPhone and Android! Please provide this summary of care documentation to your next provider. Your primary care clinician is listed as Severiano Pippin. If you have any questions after today's visit, please call [phone].

## 2018-05-10 NOTE — LETTER
5/10/2018 9:29 AM 
 
Mr. Jeannine Díaz [address] [facility] [address] Dear Jeannine Díaz: 
 
Please find your most recent results below. Resulted Orders CBC W/O DIFF Result Value Ref Range WBC 7.1 3.4 - 10.8 x10E3/uL  
 RBC 5.24 4.14 - 5.80 x10E6/uL HGB 15.2 13.0 - 17.7 g/dL HCT 47.3 37.5 - 51.0 % MCV 90 79 - 97 fL  
 MCH 29.0 26.6 - 33.0 pg  
 MCHC 32.1 31.5 - 35.7 g/dL  
 RDW 14.0 12.3 - 15.4 % PLATELET 944 805 - 280 x10E3/uL Narrative Performed at:  [address]  [id number] : Theresa London MD, Phone:  [phone] METABOLIC PANEL, COMPREHENSIVE Result Value Ref Range Glucose 139 (H) 65 - 99 mg/dL BUN 22 8 - 27 mg/dL Creatinine 1.21 0.76 - 1.27 mg/dL GFR est non-AA 63 >59 mL/min/1.73 GFR est AA 73 >59 mL/min/1.73  
 BUN/Creatinine ratio 18 10 - 24 Sodium 138 134 - 144 mmol/L Potassium 4.7 3.5 - 5.2 mmol/L Chloride 100 96 - 106 mmol/L  
 CO2 22 18 - 29 mmol/L Calcium 9.1 8.6 - 10.2 mg/dL Protein, total 6.8 6.0 - 8.5 g/dL Albumin 4.3 3.6 - 4.8 g/dL GLOBULIN, TOTAL 2.5 1.5 - 4.5 g/dL A-G Ratio 1.7 1.2 - 2.2 Bilirubin, total 0.5 0.0 - 1.2 mg/dL Alk. phosphatase 57 39 - 117 IU/L  
 AST (SGOT) 45 (H) 0 - 40 IU/L  
 ALT (SGPT) 57 (H) 0 - 44 IU/L Narrative Performed at:  [address]  [id number] : Theresa London MD, Phone:  [phone] LIPID PANEL Result Value Ref Range Cholesterol, total 175 100 - 199 mg/dL Triglyceride 141 0 - 149 mg/dL HDL Cholesterol 43 >39 mg/dL VLDL, calculated 28 5 - 40 mg/dL LDL, calculated 104 (H) 0 - 99 mg/dL Narrative Performed at:  [address]  [id number] : Theresa London MD, Phone:  [phone] CVD REPORT Result Value Ref Range INTERPRETATION Note Comment: Supplemental report is available. Narrative Performed at:  [address] [place] [address]  [id number] : Nicol Roblero MD, Phone:  [phone] DIABETES PATIENT EDUCATION Result Value Ref Range PDF Image Not applicable Narrative Performed at:  [address] [place] [address]  [id number] : Nicol Roblero MD, Phone:  [phone] Please call me if you have any questions: [phone] Sincerely, Cony Alvarez MD

## 2018-05-10 NOTE — TELEPHONE ENCOUNTER
Dr. Rosalind Sneed office returning call to nurse Nida Perez. Ask that you be informed that patient last seen 2013 and no immunizations on file.     Call [phone]

## 2018-05-10 NOTE — PROGRESS NOTES
HPI       Chief Complaint: follow up lab results, f/u for HTN, HLD    Analisa Matute is a 60 y.o. male who presents to follow up lab results. Had a nosebleed prior to the lab draw, none since then. HTN - still taking lisinopril 10mg daily, ASA 81mg daily. BP normally 140s-150s/70s. /71 today. States his BP right out of bed this morning was 116/68. Took it 1.5 hours later and it was 135/78. Has been keeping a BP log since our discussion at his first visit. HLD - Fasting lipid panel returned Trig 141, Tchol 175, HDL 43,  (1/19/2018 was Trig 218, Tchol 176, HDL 39, ). - Reports he had a stress test and ECHO [facility] - Delfina Jameson 2/2018. Was not having chest pain but was having SOB with steep inclines. Was told everything was fine. Reports intermittent RLE swelling that is chronic x years. Only has SOB when climbing steep inclines/steep staircases. No episodes of CP. DMII  - A1c 6.7 in 1/2018. Glyburide/metformin 5/500 BID. Reports his BGs have been 112, 114. Did have one reading of 75 one day, felt fine but hadn't eaten for awhile and had been working in the yard all day. Had eye exam last month. Checks his feet daily and puts lotion on them daily to prevent crackled heels. Reports he was tested for Hep C at previous physician's office and was negative. [address] [place] [address] names and numbers of his 2 previous physician's offices. Signed release of medical records form.       PMHx:  Past Medical History:   Diagnosis Date    Hypercholesterolemia     Hypertension     Nonspecific abnormal electrocardiogram (ECG) (EKG) 3/17/2011    Other dyspnea and respiratory abnormality 3/17/2011    Pre-operative cardiovascular examination 3/17/2011    Type II or unspecified type diabetes mellitus without mention of complication, not stated as uncontrolled 3/17/2011     Meds:   Current Outpatient Prescriptions   Medication Sig Dispense Refill    lisinopril (Valene Pencil) 10 mg tablet TAKE 1 TABLET(10 MG) BY MOUTH EVERY DAY 90 Tab 2    atorvastatin (LIPITOR) 40 mg tablet Take 1 tablet daily by mouth 90 Tab 2    glyBURIDE-metFORMIN (GLUCOVANCE) 5-500 mg per tablet Take 1 Tab by mouth Daily (before breakfast).  BABY ASPIRIN PO Take 81 mg by mouth daily.  ONETOUCH ULTRA TEST strip USE UTD TO CHECK BID  3    ONETOUCH ULTRA2 monitoring kit USE UTD  0    ciclopirox (PENLAC) 8 % solution HOANG AA ONCE DAILY PREFERABLY AT BEDTIME OR 8 HOURS BEFORE WASHING  3    co-enzyme Q-10 (CO Q-10) 100 mg capsule TAKE 1 CAPSULE BY MOUTH EVERY DAY BEFORE A MEAL      ONE TOUCH DELICA 33 gauge misc USE FOR TESTING  1    flaxseed oil (OMEGA 3 PO) Take  by mouth.  docosahexanoic acid/epa (FISH OIL PO) Take 1 g by mouth daily.  oxycodone-acetaminophen (PERCOCET) 5-325 mg per tablet Take 1 tablet by mouth every four (4) hours as needed for Pain. 15 tablet 0     Allergies:   No Known Allergies    Smoker:  History   Smoking Status    Former Smoker   Smokeless Tobacco    Never Used     ETOH:   History   Alcohol Use    Yes     Comment: ocasional     FH:   Family History   Problem Relation Age of Onset    Elevated Lipids Father        ROS  Review of Systems   Constitutional: Negative for chills, fever and weight loss. HENT: Negative for sore throat. Eyes: Negative for blurred vision and double vision. Respiratory: Negative for cough, sputum production, shortness of breath and wheezing. Cardiovascular: Positive for leg swelling. Negative for chest pain and palpitations. Gastrointestinal: Negative for abdominal pain, constipation, diarrhea, nausea and vomiting. Genitourinary: Negative for dysuria, frequency and urgency. Musculoskeletal: Negative for myalgias. Neurological: Negative for dizziness, weakness and headaches.        Physical Exam:  Visit Vitals    /71    Pulse 60    Temp 98.1 °F (36.7 °C) (Oral)    Resp 18    Ht 6' (1.829 m)    Wt 257 lb (116.6 kg)    SpO2 97%    BMI 34.86 kg/m2       Wt Readings from Last 3 Encounters:   05/10/18 257 lb (116.6 kg)   05/01/18 259 lb (117.5 kg)   09/11/14 247 lb 4.8 oz (112.2 kg)     BP Readings from Last 3 Encounters:   05/10/18 137/71   05/01/18 158/85   09/11/14 133/84      Physical Exam   Constitutional: He is oriented to person, place, and time. He appears well-developed and well-nourished. HENT:   Head: Normocephalic and atraumatic. Neck: Normal range of motion. Neck supple. No thyromegaly present. Cardiovascular: Normal rate, regular rhythm and normal heart sounds. RLE 1+ pitting edema. LLE no edema. Pulmonary/Chest: Effort normal and breath sounds normal. No respiratory distress. He has no wheezes. Abdominal: Soft. Bowel sounds are normal.   Protuberant   Neurological: He is alert and oriented to person, place, and time. Skin: Skin is warm and dry. No rash noted. No erythema. Psychiatric: He has a normal mood and affect. I personally reviewed the following lab results:   No results found for this or any previous visit (from the past 12 hour(s)). Assessment     60 y.o. male with:    ICD-10-CM ICD-9-CM    1. Mixed hyperlipidemia E78.2 272.2 atorvastatin (LIPITOR) 40 mg tablet   2. Essential hypertension I10 401.9 lisinopril (PRINIVIL, ZESTRIL) 10 mg tablet   3. Encounter for immunization Z23 V03.89 PNEUMOCOCCAL POLYSACCHARIDE VACCINE, 23-VALENT, ADULT OR IMMUNOSUPPRESSED PT DOSE,      TETANUS, DIPHTHERIA TOXOIDS AND ACELLULAR PERTUSSIS VACCINE (TDAP), IN INDIVIDS. >=7, IM              Plan       Orders Placed This Encounter    PNEUMOCOCCAL POLYSACCHARIDE VACCINE, 23-VALENT, ADULT OR IMMUNOSUPPRESSED PT DOSE,    TETANUS, DIPHTHERIA TOXOIDS AND ACELLULAR PERTUSSIS VACCINE (TDAP), IN INDIVIDS. >=7, IM    lisinopril (PRINIVIL, ZESTRIL) 10 mg tablet    atorvastatin (LIPITOR) 40 mg tablet   - Called patient's pharmacy ([facility]) to check medication refills.  Is trying to switch to mail order, 90 day supplies at a time. Pharmacy has: Lisinopril 60 tablets left, atorvastatin 60 tablets left, glyburide/metformin with 90 day supply and additional refill left. Previous physician offices:  [id number]: Dr. Lianne Coombs, [facility] [address]. [phone]. Called, they did not have any vaccinations or Hep C testing on file. [phone]: Dr. Venkat Worley, [facility]. [address] [phone]. Called, left message to call back. Patient states he is pretty sure they did not do any vaccinations there. HTN - /71 today, at goal  - Continue to monitor at home and keep BP log.   - Sent refills to mail order   - Continue taking lisinopril 10mg daily, ASA 81mg daily     HLD - 10 year ASCVD risk 25.5  - Discussed patient's fasting lipid panel and other labs. Will monitor LFTs but only mildly bumped, likely from statins.   - Increasing atorvastatin from 20mg daily to 40mg daily (high intensity). Sent 90 day supplies through to his mail order, patient aware to double up on his 20mg tablets until he picks up his 40mg daily tablets. DMII  - A1c 6.7 in 1/2018. Taking Glyburide/metformin 5/500 BID, continue. Continue checking BG at home  - Continue daily foot exams  - Continue annual eye exams  - No refills necessary yet, pharmacy stated he still had 6 months supply on file. Health maintenance  - Hep C screening negative 2/2018  - Colonoscopy done summer 2017, next due 2022  - Flu vaccine 10/2017  - Tdap - done today  - PPSV 23 - done today    Patient to RTC in 6 months to f/u  Patient discussed with Dr. Shewtha Kim    I have discussed the diagnosis with the patient and the intended plan as seen in the above orders. The patient has received an after-visit summary and questions were answered concerning future plans. I have discussed medication side effects and warnings with the patient as well.     jM Soria MD  Family Medicine Resident  PGY-1

## 2018-05-10 NOTE — PATIENT INSTRUCTIONS
Learning About Diabetes Food Guidelines  Your Care Instructions    Meal planning is important to manage diabetes. It helps keep your blood sugar at a target level (which you set with your doctor). You don't have to eat special foods. You can eat what your family eats, including sweets once in a while. But you do have to pay attention to how often you eat and how much you eat of certain foods. You may want to work with a dietitian or a certified diabetes educator (CDE) to help you plan meals and snacks. A dietitian or CDE can also help you lose weight if that is one of your goals. What should you know about eating carbs? Managing the amount of carbohydrate (carbs) you eat is an important part of healthy meals when you have diabetes. Carbohydrate is found in many foods. · Learn which foods have carbs. And learn the amounts of carbs in different foods. ¨ Bread, cereal, pasta, and rice have about 15 grams of carbs in a serving. A serving is 1 slice of bread (1 ounce), ½ cup of cooked cereal, or 1/3 cup of cooked pasta or rice. ¨ Fruits have 15 grams of carbs in a serving. A serving is 1 small fresh fruit, such as an apple or orange; ½ of a banana; ½ cup of cooked or canned fruit; ½ cup of fruit juice; 1 cup of melon or raspberries; or 2 tablespoons of dried fruit. ¨ Milk and no-sugar-added yogurt have 15 grams of carbs in a serving. A serving is 1 cup of milk or 2/3 cup of no-sugar-added yogurt. ¨ Starchy vegetables have 15 grams of carbs in a serving. A serving is ½ cup of mashed potatoes or sweet potato; 1 cup winter squash; ½ of a small baked potato; ½ cup of cooked beans; or ½ cup cooked corn or green peas. · Learn how much carbs to eat each day and at each meal. A dietitian or CDE can teach you how to keep track of the amount of carbs you eat. This is called carbohydrate counting. · If you are not sure how to count carbohydrate grams, use the Plate Method to plan meals.  It is a good, quick way to make sure that you have a balanced meal. It also helps you spread carbs throughout the day. ¨ Divide your plate by types of foods. Put non-starchy vegetables on half the plate, meat or other protein food on one-quarter of the plate, and a grain or starchy vegetable in the final quarter of the plate. To this you can add a small piece of fruit and 1 cup of milk or yogurt, depending on how many carbs you are supposed to eat at a meal.  · Try to eat about the same amount of carbs at each meal. Do not \"save up\" your daily allowance of carbs to eat at one meal.  · Proteins have very little or no carbs per serving. Examples of proteins are beef, chicken, turkey, fish, eggs, tofu, cheese, cottage cheese, and peanut butter. A serving size of meat is 3 ounces, which is about the size of a deck of cards. Examples of meat substitute serving sizes (equal to 1 ounce of meat) are 1/4 cup of cottage cheese, 1 egg, 1 tablespoon of peanut butter, and ½ cup of tofu. How can you eat out and still eat healthy? · Learn to estimate the serving sizes of foods that have carbohydrate. If you measure food at home, it will be easier to estimate the amount in a serving of restaurant food. · If the meal you order has too much carbohydrate (such as potatoes, corn, or baked beans), ask to have a low-carbohydrate food instead. Ask for a salad or green vegetables. · If you use insulin, check your blood sugar before and after eating out to help you plan how much to eat in the future. · If you eat more carbohydrate at a meal than you had planned, take a walk or do other exercise. This will help lower your blood sugar. What else should you know? · Limit saturated fat, such as the fat from meat and dairy products. This is a healthy choice because people who have diabetes are at higher risk of heart disease. So choose lean cuts of meat and nonfat or low-fat dairy products.  Use olive or canola oil instead of butter or shortening when cooking. · Don't skip meals. Your blood sugar may drop too low if you skip meals and take insulin or certain medicines for diabetes. · Check with your doctor before you drink alcohol. Alcohol can cause your blood sugar to drop too low. Alcohol can also cause a bad reaction if you take certain diabetes medicines. Follow-up care is a key part of your treatment and safety. Be sure to make and go to all appointments, and call your doctor if you are having problems. It's also a good idea to know your test results and keep a list of the medicines you take. Where can you learn more? Go to http://roberto carlos-chidi.info/. Enter F185 in the search box to learn more about \"Learning About Diabetes Food Guidelines. \"  Current as of: March 13, 2017  Content Version: 11.4  © 3703-4491 [employer]. Care instructions adapted under license by [employer] (which disclaims liability or warranty for this information). If you have questions about a medical condition or this instruction, always ask your healthcare professional. [address] any warranty or liability for your use of this information. Hepatitis C Virus Tests: About These Tests  What are they? Hepatitis C virus tests are blood tests that check for substances in the blood that show whether you have hepatitis C now or had it in the past. The tests can also tell you what type of hepatitis C you have and how severe the disease is. Why are these tests done? You may need these tests if:  · You have symptoms of hepatitis. · You may have been exposed to the virus. You are more likely to have been exposed to the virus if you inject drugs or are exposed to body fluids (such as if you are a health care worker). · You have had other tests that show you have liver problems. · You were born between [facility]. People in this age group are more likely to have hepatitis C and not know it.   · You have HIV infection. The tests also are done to help your doctor decide about your treatment and see how well it works. How can you prepare for these tests? You do not need to do anything before you have these tests. What happens during these tests? A health professional takes a sample of your blood. What else should you know about these tests? · The tests can tell your doctor what type of hepatitis C you have and how severe it is. This can help your doctor determine treatment and see how effective it is. · If you have the tests soon after you were infected with hepatitis C, they may show that you do not have the disease even when you have it. This is because the substances that show you have hepatitis C can take weeks or months to develop, so they may not be in your blood yet. Your doctor may want you to be tested again. · If the tests show you have long-term hepatitis C, you need to take steps to prevent spreading the disease. What happens after these tests? · You will probably be able to go home right away. · You can go back to your usual activities right away. When should you call for help? Watch closely for changes in your health, and be sure to contact your doctor if you have any problems. Follow-up care is a key part of your treatment and safety. Be sure to make and go to all appointments, and call your doctor if you are having problems. It's also a good idea to keep a list of the medicines you take. Ask your doctor when you can expect to have your test results. Where can you learn more? Go to http://roberto carlos-chidi.info/. Enter R081 in the search box to learn more about \"Hepatitis C Virus Tests: About These Tests. \"  Current as of: March 3, 2017  Content Version: 11.4  © 7551-7420 [employer]. Care instructions adapted under license by [employer] (which disclaims liability or warranty for this information).  If you have questions about a medical condition or this instruction, always ask your healthcare professional. [place] [address] any warranty or liability for your use of this information.

## 2018-06-28 ENCOUNTER — OFFICE VISIT (OUTPATIENT)
Dept: FAMILY MEDICINE CLINIC | Age: 63
End: 2018-06-28

## 2018-06-28 VITALS
BODY MASS INDEX: 34.54 KG/M2 | RESPIRATION RATE: 16 BRPM | WEIGHT: 255 LBS | SYSTOLIC BLOOD PRESSURE: 126 MMHG | OXYGEN SATURATION: 96 % | HEART RATE: 86 BPM | HEIGHT: 72 IN | TEMPERATURE: 97.5 F | DIASTOLIC BLOOD PRESSURE: 85 MMHG

## 2018-06-28 DIAGNOSIS — R39.198 DIFFICULTY URINATING: ICD-10-CM

## 2018-06-28 DIAGNOSIS — N40.1 BENIGN PROSTATIC HYPERPLASIA WITH URINARY HESITANCY: Primary | ICD-10-CM

## 2018-06-28 DIAGNOSIS — R39.11 BENIGN PROSTATIC HYPERPLASIA WITH URINARY HESITANCY: Primary | ICD-10-CM

## 2018-06-28 LAB
BILIRUB UR QL STRIP: NEGATIVE
GLUCOSE UR-MCNC: NEGATIVE MG/DL
KETONES P FAST UR STRIP-MCNC: NEGATIVE MG/DL
PH UR STRIP: 6 [PH] (ref 4.6–8)
PROT UR QL STRIP: NEGATIVE
SP GR UR STRIP: 1.03 (ref 1–1.03)
UA UROBILINOGEN AMB POC: NORMAL (ref 0.2–1)
URINALYSIS CLARITY POC: CLEAR
URINALYSIS COLOR POC: YELLOW
URINE BLOOD POC: NEGATIVE
URINE LEUKOCYTES POC: NORMAL
URINE NITRITES POC: NEGATIVE

## 2018-06-28 RX ORDER — CIPROFLOXACIN 500 MG/1
500 TABLET ORAL 2 TIMES DAILY
Qty: 20 TAB | Refills: 0 | Status: SHIPPED | OUTPATIENT
Start: 2018-06-28 | End: 2018-06-28 | Stop reason: SDUPTHER

## 2018-06-28 RX ORDER — CIPROFLOXACIN 500 MG/1
500 TABLET ORAL 2 TIMES DAILY
Qty: 20 TAB | Refills: 0 | Status: SHIPPED | OUTPATIENT
Start: 2018-06-28 | End: 2018-07-08

## 2018-06-28 RX ORDER — TAMSULOSIN HYDROCHLORIDE 0.4 MG/1
0.4 CAPSULE ORAL DAILY
Qty: 90 CAP | Refills: 0 | Status: SHIPPED | OUTPATIENT
Start: 2018-06-28 | End: 2018-06-28 | Stop reason: SDUPTHER

## 2018-06-28 RX ORDER — TAMSULOSIN HYDROCHLORIDE 0.4 MG/1
0.4 CAPSULE ORAL DAILY
Qty: 90 CAP | Refills: 0 | Status: SHIPPED | OUTPATIENT
Start: 2018-06-28 | End: 2018-09-26 | Stop reason: SDUPTHER

## 2018-06-28 NOTE — PROGRESS NOTES
Chief Complaint   Patient presents with    Urinary Hesitancy     slow flow, hx kidney stone/ cramping

## 2018-06-28 NOTE — MR AVS SNAPSHOT
[address] [id number] Patient: Dawit Shaffer MRN: [id number] :1955 Visit Information Date & Time Provider Department Dept. Phone Encounter #  
 2018  1:00 PM Felicitas Galindo, [facility] [phone] [id number] Upcoming Health Maintenance Date Due  
 EYE EXAM RETINAL OR DILATED Q1 1965 FOBT Q 1 YEAR AGE 50-75 2004 ZOSTER VACCINE AGE 60> 2014 HEMOGLOBIN A1C Q6M 2018 Influenza Age 5 to Adult 2018 MICROALBUMIN Q1 10/25/2018 LIPID PANEL Q1 2019 FOOT EXAM Q1 5/10/2019 DTaP/Tdap/Td series (2 - Td) 5/10/2028 Allergies as of 2018  Review Complete On: 2018 By: Aliene Siemens, LPN No Known Allergies Current Immunizations  Reviewed on 5/10/2018 Name Date Influenza Vaccine 10/25/2017 12:00 AM  
 Pneumococcal Polysaccharide (PPSV-23) 5/10/2018 Tdap 5/10/2018 Not reviewed this visit You Were Diagnosed With   
  
 Codes Comments Difficulty urinating    -  Primary ICD-10-CM: R39.198 ICD-9-CM: 788.99 Vitals BP Pulse Temp Resp Height(growth percentile) Weight(growth percentile) 126/85 86 97.5 °F (36.4 °C) (Oral) 16 6' (1.829 m) 255 lb (115.7 kg) SpO2 BMI Smoking Status 96% 34.58 kg/m2 Former Smoker BMI and BSA Data Body Mass Index Body Surface Area 34.58 kg/m 2 2.42 m 2 Preferred Pharmacy Pharmacy Name Phone [id number]. [place]  [phone] Your Updated Medication List  
  
   
This list is accurate as of 18  1:42 PM.  Always use your most recent med list.  
  
  
  
  
 atorvastatin 40 mg tablet Commonly known as:  LIPITOR Take 1 tablet daily by mouth BABY ASPIRIN PO Take 81 mg by mouth daily. ciclopirox 8 % solution Commonly known as:  PENLAC HOANG AA ONCE DAILY PREFERABLY AT BEDTIME OR 8 HOURS BEFORE WASHING  
  
 ciprofloxacin HCl 500 mg tablet Commonly known as:  CIPRO Take 1 Tab by mouth two (2) times a day for 10 days. co-enzyme Q-10 100 mg capsule Commonly known as:  CO Q-10  
TAKE 1 CAPSULE BY MOUTH EVERY DAY BEFORE A MEAL  
  
 FISH OIL PO Take 1 g by mouth daily. glyBURIDE-metFORMIN 5-500 mg per tablet Commonly known as:  Garlin [place] Take 1 Tab by mouth Daily (before breakfast). lisinopril 10 mg tablet Commonly known as:  PRINIVIL, ZESTRIL  
TAKE 1 TABLET(10 MG) BY MOUTH EVERY DAY  
  
 OMEGA 3 PO Take  by mouth. One Touch Delica 33 gauge Misc Generic drug:  lancets USE FOR TESTING  
  
 ONETOUCH ULTRA TEST strip Generic drug:  glucose blood VI test strips USE UTD TO CHECK BID  
  
 ONETOUCH ULTRA2 monitoring kit Generic drug:  Blood-Glucose Meter USE UTD  
  
 oxyCODONE-acetaminophen 5-325 mg per tablet Commonly known as:  PERCOCET Take 1 tablet by mouth every four (4) hours as needed for Pain.  
  
 tamsulosin 0.4 mg capsule Commonly known as:  FLOMAX Take 1 Cap by mouth daily. Prescriptions Sent to Pharmacy Refills  
 tamsulosin (FLOMAX) 0.4 mg capsule 0 Sig: Take 1 Cap by mouth daily. Class: Normal  
 Pharmacy: [address]  Ph #: [phone] Route: Oral  
 ciprofloxacin HCl (CIPRO) 500 mg tablet 0 Sig: Take 1 Tab by mouth two (2) times a day for 10 days. Class: Normal  
 Pharmacy: [address] [place] [address] Ph #: [phone] Route: Oral  
  
We Performed the Following AMB POC URINALYSIS DIP STICK AUTO W/O MICRO [42481 CPT(R)] CULTURE, URINE [id number] CPT(R)] Patient Instructions Benign Prostatic Hyperplasia: Care Instructions Your Care Instructions Benign prostatic hyperplasia, or BPH, is an enlarged prostate gland.  The prostate is a small gland that makes some of the fluid in semen. Prostate enlargement happens to almost all men as they age. It is usually not serious. BPH does not cause prostate cancer. As the prostate gets bigger, it may partly block the flow of urine. You may have a hard time getting a urine stream started or completely stopped. BPH can cause dribbling. You may have a weak urine stream, or you may have to urinate more often than you used to, especially at night. Most men find these problems easy to manage. You do not need treatment unless your symptoms bother you a lot or you have other problems, such as bladder infections or stones. In these cases, medicines may help. Surgery is not needed unless the urine flow is blocked or the symptoms do not get better with medicine. Follow-up care is a key part of your treatment and safety. Be sure to make and go to all appointments, and call your doctor if you are having problems. It's also a good idea to know your test results and keep a list of the medicines you take. How can you care for yourself at home? · Take plenty of time to urinate. Try to relax. · Try \"double voiding. \" Urinate as much you can, relax for a few moments, and then try to urinate again. · Sit on the toilet to urinate. · Read or think of other things while you are waiting. · Turn on a faucet, or try to picture running water. Some men find that this helps get their urine flowing. · If dribbling is a problem, wash your penis daily to avoid skin irritation and infection. · Avoid caffeine and alcohol. These drinks will increase how often you need to urinate. Spread your fluid intake throughout the day. If the urge to urinate often wakes you at night, limit your fluid intake in the evening. Urinate right before you go to bed. · Many over-the-counter cold and allergy medicines can make the symptoms of BPH worse.  Avoid antihistamines, decongestants, and allergy pills, if you can. Read the warnings on the package. · If you take any prescription medicines, especially tranquilizers or antidepressants, ask your doctor or pharmacist whether they can cause urination problems. There may be other medicines you can use that do not cause urinary problems. · Be safe with medicines. Take your medicines exactly as prescribed. Call your doctor if you think you are having a problem with your medicine. When should you call for help? Call your doctor now or seek immediate medical care if: 
? · You cannot urinate at all. ? · You have symptoms of a urinary infection. For example: ¨ You have blood or pus in your urine. ¨ You have pain in your back just below your rib cage. This is called flank pain. ¨ You have a fever, chills, or body aches. ¨ It hurts to urinate. ¨ You have groin or belly pain. ? Watch closely for changes in your health, and be sure to contact your doctor if: 
? · It hurts when you ejaculate. ? · Your urinary problems get a lot worse or bother you a lot. Where can you learn more? Go to http://roberto carlos-chidi.info/. Enter K375 in the search box to learn more about \"Benign Prostatic Hyperplasia: Care Instructions. \" Current as of: March 14, 2017 Content Version: 11.4 © 4948-9658 [employer]. Care instructions adapted under license by [employer] (which disclaims liability or warranty for this information). If you have questions about a medical condition or this instruction, always ask your healthcare professional. [place] [address] any warranty or liability for your use of this information. Introducing [facility] & HEALTH SERVICES! [facility] introduces J-Kan patient portal. Now you can access parts of your medical record, email your doctor's office, and request medication refills online. 1. In your internet browser, go to [email or web address]. [place] 2. Click on the First Time User? Click Here link in the Sign In box. You will see the New Member Sign Up page. 3. Enter your Epoq Access Code exactly as it appears below. You will not need to use this code after youve completed the sign-up process. If you do not sign up before the expiration date, you must request a new code. · Epoq Access Code: [email or web address] Expires: 7/30/2018 10:57 AM 
 
4. Enter the last four digits of your Social Security Number (xxxx) and Date of Birth (mm/dd/yyyy) as indicated and click Submit. You will be taken to the next sign-up page. 5. Create a Epoq ID. This will be your Epoq login ID and cannot be changed, so think of one that is secure and easy to remember. 6. Create a Epoq password. You can change your password at any time. 7. Enter your Password Reset Question and Answer. This can be used at a later time if you forget your password. 8. Enter your e-mail address. You will receive e-mail notification when new information is available in [address] 9. Click Sign Up. You can now view and download portions of your medical record. 10. Click the Download Summary menu link to download a portable copy of your medical information. If you have questions, please visit the Frequently Asked Questions section of the Epoq website. Remember, Epoq is NOT to be used for urgent needs. For medical emergencies, dial 911. Now available from your iPhone and Android! Please provide this summary of care documentation to your next provider. Your primary care clinician is listed as Junior Cope. If you have any questions after today's visit, please call [phone].

## 2018-06-28 NOTE — PROGRESS NOTES
Subjective:      Liliana Lopez is a 60 y.o. male who presents for difficulty urinating. Issues discussed today   Reports  symptoms have been going on for past two days. Reports that he has been difficulty initiating his stream, he also has had urgency over this time. Denies any fevers, chills or dysuria. Has had a history of kidney stone and had a stent placement. Reports that current symptoms are much different. Also has had two surgeries due to scar tissue from above procedure. Has not followed up with Urology in some time. Reports that he does have a baseline weak stream, however it is currently weaker. ROS:  Review of Systems   Constitutional: Negative for chills and fever. Respiratory: Negative for cough and shortness of breath. Cardiovascular: Negative for chest pain and palpitations. Genitourinary: Positive for urgency. Negative for dysuria, flank pain and hematuria. Skin: Negative for itching and rash. PMHx:  Past Medical History:   Diagnosis Date    Hypercholesterolemia     Hypertension     Nonspecific abnormal electrocardiogram (ECG) (EKG) 3/17/2011    Other dyspnea and respiratory abnormality 3/17/2011    Pre-operative cardiovascular examination 3/17/2011    Type II or unspecified type diabetes mellitus without mention of complication, not stated as uncontrolled 3/17/2011       Meds:   Current Outpatient Prescriptions   Medication Sig Dispense Refill    ciprofloxacin HCl (CIPRO) 500 mg tablet Take 1 Tab by mouth two (2) times a day for 10 days. 20 Tab 0    tamsulosin (FLOMAX) 0.4 mg capsule Take 1 Cap by mouth daily. 90 Cap 0    lisinopril (PRINIVIL, ZESTRIL) 10 mg tablet TAKE 1 TABLET(10 MG) BY MOUTH EVERY DAY 90 Tab 2    atorvastatin (LIPITOR) 40 mg tablet Take 1 tablet daily by mouth 90 Tab 2    glyBURIDE-metFORMIN (GLUCOVANCE) 5-500 mg per tablet Take 1 Tab by mouth Daily (before breakfast).  BABY ASPIRIN PO Take 81 mg by mouth daily.       ONETOUCH ULTRA TEST strip USE UTD TO CHECK BID  3    ONETOUCH ULTRA2 monitoring kit USE UTD  0    ciclopirox (PENLAC) 8 % solution HOANG AA ONCE DAILY PREFERABLY AT BEDTIME OR 8 HOURS BEFORE WASHING  3    co-enzyme Q-10 (CO Q-10) 100 mg capsule TAKE 1 CAPSULE BY MOUTH EVERY DAY BEFORE A MEAL      ONE TOUCH DELICA 33 gauge misc USE FOR TESTING  1    flaxseed oil (OMEGA 3 PO) Take  by mouth.  docosahexanoic acid/epa (FISH OIL PO) Take 1 g by mouth daily.  oxycodone-acetaminophen (PERCOCET) 5-325 mg per tablet Take 1 tablet by mouth every four (4) hours as needed for Pain. 15 tablet 0       Allergies:   No Known Allergies    Smoker:  History   Smoking Status    Former Smoker   Smokeless Tobacco    Never Used       ETOH:   History   Alcohol Use    Yes     Comment: ocasional       FH:   Family History   Problem Relation Age of Onset    Elevated Lipids Father          Objective:     Visit Vitals    /85    Pulse 86    Temp 97.5 °F (36.4 °C) (Oral)    Resp 16    Ht 6' (1.829 m)    Wt 255 lb (115.7 kg)    SpO2 96%    BMI 34.58 kg/m2       Physical Examination:   GEN: No apparent distress. Alert and oriented and responds to all questions appropriately. LUNGS: Respirations unlabored; clear to auscultation bilaterally  CARDIOVASCULAR: Regular, rate, and rhythm without murmurs, gallops or rubs   ABDOMEN: Soft; nontender; nondistended; normoactive bowel sounds; no masses or organomegaly  SKIN: No obvious rashes. Assessment:       ICD-10-CM ICD-9-CM    1. Benign prostatic hyperplasia with urinary hesitancy N40.1 600.01     R39.11 788.64    2.  Difficulty urinating R39.198 788.99 AMB POC URINALYSIS DIP STICK AUTO W/O MICRO      CULTURE, URINE      ciprofloxacin HCl (CIPRO) 500 mg tablet      tamsulosin (FLOMAX) 0.4 mg capsule           Plan:       Presentation consistent with BPH   Start Flomax 0.4mg daily   UA - trace leukocytes, negative nitrites   Ciprofloxacin 500 mg BID to cover for prostatitis   Follow up urine culture. ED precautions given       Patient is counseled to return to the office if symptoms do not improve as expected. Urgent consultation with the nearest Emergency Department is strongly recommended if condition worsens. Patient is counseled to follow up as recommended and to inform the office if any changes in treatment are recommended.       Patient discussed with Dr. Lorne Flores, attending physician        Signed By:  Surekha Glaser MD    Family Medicine Resident

## 2018-06-28 NOTE — PATIENT INSTRUCTIONS

## 2018-06-29 LAB — BACTERIA UR CULT: NO GROWTH

## 2018-07-06 ENCOUNTER — TELEPHONE (OUTPATIENT)
Dept: FAMILY MEDICINE CLINIC | Age: 63
End: 2018-07-06

## 2018-07-06 NOTE — TELEPHONE ENCOUNTER
Called and discussed lab with patient. Reports minimal improvement in symptoms. Instructed patient to make appointment with Urology. He is in agreement with the plan.    Howard Sharma MD

## 2018-09-04 ENCOUNTER — PATIENT MESSAGE (OUTPATIENT)
Dept: FAMILY MEDICINE CLINIC | Age: 63
End: 2018-09-04

## 2018-09-04 RX ORDER — GLYBURIDE-METFORMIN HYDROCHLORIDE 5; 500 MG/1; MG/1
1 TABLET ORAL
Qty: 90 TAB | Refills: 0 | Status: SHIPPED | OUTPATIENT
Start: 2018-09-04 | End: 2018-09-05 | Stop reason: SDUPTHER

## 2018-09-04 NOTE — TELEPHONE ENCOUNTER
Refilling glyburide-metformin x 3 months  Patient due for follow up in 11/2018, will have staff please remind him of this

## 2018-09-05 RX ORDER — GLYBURIDE-METFORMIN HYDROCHLORIDE 5; 500 MG/1; MG/1
1 TABLET ORAL
Qty: 90 TAB | Refills: 0 | Status: SHIPPED | OUTPATIENT
Start: 2018-09-05 | End: 2018-11-09 | Stop reason: SDUPTHER

## 2018-09-26 DIAGNOSIS — R39.198 DIFFICULTY URINATING: ICD-10-CM

## 2018-09-26 RX ORDER — TAMSULOSIN HYDROCHLORIDE 0.4 MG/1
0.4 CAPSULE ORAL DAILY
Qty: 90 CAP | Refills: 0 | Status: SHIPPED | OUTPATIENT
Start: 2018-09-26 | End: 2019-01-07 | Stop reason: SDUPTHER

## 2018-09-26 NOTE — TELEPHONE ENCOUNTER
Patient needs a refill of the following  Requested Prescriptions     Pending Prescriptions Disp Refills    tamsulosin (FLOMAX) 0.4 mg capsule 90 Cap 0     Sig: Take 1 Cap by mouth daily.

## 2018-11-13 ENCOUNTER — OFFICE VISIT (OUTPATIENT)
Dept: FAMILY MEDICINE CLINIC | Age: 63
End: 2018-11-13

## 2018-11-13 VITALS
HEART RATE: 70 BPM | WEIGHT: 261.8 LBS | HEIGHT: 72 IN | RESPIRATION RATE: 16 BRPM | DIASTOLIC BLOOD PRESSURE: 92 MMHG | OXYGEN SATURATION: 96 % | BODY MASS INDEX: 35.46 KG/M2 | SYSTOLIC BLOOD PRESSURE: 142 MMHG | TEMPERATURE: 98.4 F

## 2018-11-13 DIAGNOSIS — Z23 ENCOUNTER FOR IMMUNIZATION: ICD-10-CM

## 2018-11-13 DIAGNOSIS — E11.9 CONTROLLED TYPE 2 DIABETES MELLITUS WITHOUT COMPLICATION, WITHOUT LONG-TERM CURRENT USE OF INSULIN (HCC): Primary | ICD-10-CM

## 2018-11-13 DIAGNOSIS — I10 ESSENTIAL HYPERTENSION: ICD-10-CM

## 2018-11-13 RX ORDER — GLUCOSAMINE SULFATE 1500 MG
POWDER IN PACKET (EA) ORAL 2 TIMES DAILY
COMMUNITY

## 2018-11-13 NOTE — PATIENT INSTRUCTIONS
- Schedule an appointment to return for your chronic back pain to be evaluated    - Work on diet and exercise - if your BP does not improve over the next few months, schedule a visit in 3 months to be seen to discuss medication adjustments. If your BP improves at home (<140/90), ok to wait until 5/2019. Follow up 5/2019 for annual physical exam.      DASH Diet: Care Instructions  Your Care Instructions    The DASH diet is an eating plan that can help lower your blood pressure. DASH stands for Dietary Approaches to Stop Hypertension. Hypertension is high blood pressure. The DASH diet focuses on eating foods that are high in calcium, potassium, and magnesium. These nutrients can lower blood pressure. The foods that are highest in these nutrients are fruits, vegetables, low-fat dairy products, nuts, seeds, and legumes. But taking calcium, potassium, and magnesium supplements instead of eating foods that are high in those nutrients does not have the same effect. The DASH diet also includes whole grains, fish, and poultry. The DASH diet is one of several lifestyle changes your doctor may recommend to lower your high blood pressure. Your doctor may also want you to decrease the amount of sodium in your diet. Lowering sodium while following the DASH diet can lower blood pressure even further than just the DASH diet alone. Follow-up care is a key part of your treatment and safety. Be sure to make and go to all appointments, and call your doctor if you are having problems. It's also a good idea to know your test results and keep a list of the medicines you take. How can you care for yourself at home? Following the DASH diet  · Eat 4 to 5 servings of fruit each day. A serving is 1 medium-sized piece of fruit, ½ cup chopped or canned fruit, 1/4 cup dried fruit, or 4 ounces (½ cup) of fruit juice. Choose fruit more often than fruit juice. · Eat 4 to 5 servings of vegetables each day.  A serving is 1 cup of lettuce or raw leafy vegetables, ½ cup of chopped or cooked vegetables, or 4 ounces (½ cup) of vegetable juice. Choose vegetables more often than vegetable juice. · Get 2 to 3 servings of low-fat and fat-free dairy each day. A serving is 8 ounces of milk, 1 cup of yogurt, or 1 ½ ounces of cheese. · Eat 6 to 8 servings of grains each day. A serving is 1 slice of bread, 1 ounce of dry cereal, or ½ cup of cooked rice, pasta, or cooked cereal. Try to choose whole-grain products as much as possible. · Limit lean meat, poultry, and fish to 2 servings each day. A serving is 3 ounces, about the size of a deck of cards. · Eat 4 to 5 servings of nuts, seeds, and legumes (cooked dried beans, lentils, and split peas) each week. A serving is 1/3 cup of nuts, 2 tablespoons of seeds, or ½ cup of cooked beans or peas. · Limit fats and oils to 2 to 3 servings each day. A serving is 1 teaspoon of vegetable oil or 2 tablespoons of salad dressing. · Limit sweets and added sugars to 5 servings or less a week. A serving is 1 tablespoon jelly or jam, ½ cup sorbet, or 1 cup of lemonade. · Eat less than 2,300 milligrams (mg) of sodium a day. If you limit your sodium to 1,500 mg a day, you can lower your blood pressure even more. Tips for success  · Start small. Do not try to make dramatic changes to your diet all at once. You might feel that you are missing out on your favorite foods and then be more likely to not follow the plan. Make small changes, and stick with them. Once those changes become habit, add a few more changes. · Try some of the following:  ? Make it a goal to eat a fruit or vegetable at every meal and at snacks. This will make it easy to get the recommended amount of fruits and vegetables each day. ? Try yogurt topped with fruit and nuts for a snack or healthy dessert. ? Add lettuce, tomato, cucumber, and onion to sandwiches. ? Combine a ready-made pizza crust with low-fat mozzarella cheese and lots of vegetable toppings. Try using tomatoes, squash, spinach, broccoli, carrots, cauliflower, and onions. ? Have a variety of cut-up vegetables with a low-fat dip as an appetizer instead of chips and dip. ? Sprinkle sunflower seeds or chopped almonds over salads. Or try adding chopped walnuts or almonds to cooked vegetables. ? Try some vegetarian meals using beans and peas. Add garbanzo or kidney beans to salads. Make burritos and tacos with mashed stanley beans or black beans. Where can you learn more? Go to http://roberto carlos-chidi.info/. Enter Y907 in the search box to learn more about \"DASH Diet: Care Instructions. \"  Current as of: December 6, 2017  Content Version: 11.8  © 4283-5297 [employer]. Care instructions adapted under license by "[employer]" (which disclaims liability or warranty for this information). If you have questions about a medical condition or this instruction, always ask your healthcare professional. [address] any warranty or liability for your use of this information.

## 2018-11-13 NOTE — PROGRESS NOTES
Darcy Don is a 60 y.o. male  Chief Complaint   Patient presents with    Follow-up     needs labs for diabetes per appointment comments    Back Pain     full back pain x 4 months, \"stiff\" \"sore\", L side \"spasm\" per patient, has taken excedrin migraine to relieve the pain     Visit Vitals  BP (!) 142/92 (BP 1 Location: Right arm, BP Patient Position: Sitting)   Pulse 70   Temp 98.4 °F (36.9 °C) (Oral)   Resp 16   Ht 6' (1.829 m)   Wt 261 lb 12.8 oz (118.8 kg)   SpO2 96%   BMI 35.51 kg/m²     1. Have you been to the ER, urgent care clinic since your last visit? Hospitalized since your last visit? No    2. Have you seen or consulted any other health care providers outside of the [facility] since your last visit? Include any pap smears or colon screening.  No  Health Maintenance Due   Topic Date Due    EYE EXAM RETINAL OR DILATED Q1  11/28/1965    Shingrix Vaccine Age 50> (1 of 2) 11/28/2004    FOBT Q 1 YEAR AGE 50-75  11/28/2004    HEMOGLOBIN A1C Q6M  07/20/2018    Influenza Age 9 to Adult  08/01/2018    MICROALBUMIN Q1  10/25/2018

## 2018-11-13 NOTE — PROGRESS NOTES
61 yo male here for diabetes and HTN followup    [facility]    HTN -- has been controlled; will check at home     Hypercholesterolemia -- on lipitor    I reviewed with the resident the medical history and the resident's findings on the physical examination. I discussed with the resident the patient's diagnosis and concur with the plan.

## 2018-11-13 NOTE — PROGRESS NOTES
HPI       Chief Complaint: DMII follow up     Gregg Hurtado is a 60 y.o. male who presents for DMII, HTN follow up. HTN -  lisinopril 10mg daily, ASA 81mg daily. Home BP is checked periodically, usually 140s-150s/75 range. Today's /72, repeat 142/92. Goal is <140/90. Patient is trying to work on becoming more active. Denies HA, vision changes, dizziness, lightheadedness.       DMII  - A1c 6.7 in 1/2018. Glyburide/metformin 5/500 BID. Home BG readings usually 110s-140s depending on the time of day. - Reports the last refill was accidentally refilled for daily instead of BID, was out for about 1 week, restarted it last week with new prescription that was correctly filled for BID dosage.   - Last diabetic eye exam - 2/2018, no evidence of diabetic retinopathy  - Checks feet daily, lotions daily, last diabetic foot exam 5/2018    Urinary hesitancy - hx of kidney stones, difficulty starting stream. Was seen in clinic 6/2018, given antibiotic and flomax, tried for 10 days which did not help. Went to [facility] who thought it was history of scar tissue causing a \"constriction. \" He then saw another urologist (Dr. Sunday Goldberg) who did \"put in a camera and did not see any constriction or scar tissue\" and thought it was the prostate. Reports after the procedure however, his symptoms began to improve. Had a 3rd appt scheduled and his symptoms had completely resolved. He was recommended to stay on the flomax and symptoms have not returned.      PMHx:  Past Medical History:   Diagnosis Date    Hypercholesterolemia     Hypertension     Nonspecific abnormal electrocardiogram (ECG) (EKG) 3/17/2011    Other dyspnea and respiratory abnormality 3/17/2011    Pre-operative cardiovascular examination 3/17/2011    Type II or unspecified type diabetes mellitus without mention of complication, not stated as uncontrolled 3/17/2011     Meds:   Current Outpatient Medications   Medication Sig Dispense Refill    cholecalciferol (VITAMIN D3) 1,000 unit cap Take  by mouth daily.  glyBURIDE-metFORMIN (GLUCOVANCE) 5-500 mg per tablet Take 1 Tab by mouth two (2) times daily (with meals). 180 Tab 0    lisinopril (PRINIVIL, ZESTRIL) 10 mg tablet TAKE 1 TABLET(10 MG) BY MOUTH EVERY DAY 90 Tab 2    atorvastatin (LIPITOR) 40 mg tablet Take 1 tablet daily by mouth 90 Tab 2    BABY ASPIRIN PO Take 81 mg by mouth daily.  ONETOUCH ULTRA TEST strip USE UTD TO CHECK BID  3    ONETOUCH ULTRA2 monitoring kit USE UTD  0    ONE TOUCH DELICA 33 gauge misc USE FOR TESTING  1    tamsulosin (FLOMAX) 0.4 mg capsule Take 1 Cap by mouth daily. 90 Cap 0    flaxseed oil (OMEGA 3 PO) Take  by mouth.  docosahexanoic acid/epa (FISH OIL PO) Take 1 g by mouth daily.  ciclopirox (PENLAC) 8 % solution HOANG AA ONCE DAILY PREFERABLY AT BEDTIME OR 8 HOURS BEFORE WASHING  3    co-enzyme Q-10 (CO Q-10) 100 mg capsule TAKE 1 CAPSULE BY MOUTH EVERY DAY BEFORE A MEAL       Allergies:   No Known Allergies    Smoker:  Social History     Tobacco Use   Smoking Status Former Smoker   Smokeless Tobacco Never Used     ETOH:   Social History     Substance and Sexual Activity   Alcohol Use Yes    Comment: ocasional     FH:   Family History   Problem Relation Age of Onset    Elevated Lipids Father        ROS  Review of Systems   Constitutional: Negative for chills, fever and weight loss. HENT: Negative for congestion, sinus pain and sore throat. Eyes: Negative for blurred vision and double vision. Respiratory: Negative for cough, shortness of breath and wheezing. Cardiovascular: Negative for chest pain and palpitations. Gastrointestinal: Negative for abdominal pain, constipation, diarrhea, nausea and vomiting. Genitourinary: Negative for dysuria, frequency and urgency. Neurological: Negative for dizziness, weakness and headaches.        Physical Exam:  Visit Vitals  BP (!) 142/92 (BP 1 Location: Right arm, BP Patient Position: Sitting)   Pulse 70 Temp 98.4 °F (36.9 °C) (Oral)   Resp 16   Ht 6' (1.829 m)   Wt 261 lb 12.8 oz (118.8 kg)   SpO2 96%   BMI 35.51 kg/m²       Wt Readings from Last 3 Encounters:   11/13/18 261 lb 12.8 oz (118.8 kg)   06/28/18 255 lb (115.7 kg)   05/10/18 257 lb (116.6 kg)     BP Readings from Last 3 Encounters:   11/13/18 (!) 142/92   06/28/18 126/85   05/10/18 137/71      Physical Exam   Constitutional: He is oriented to person, place, and time. He appears well-developed. HENT:   Head: Normocephalic and atraumatic. Eyes: EOM are normal. Pupils are equal, round, and reactive to light. Neck: Normal range of motion. Neck supple. No thyromegaly present. Cardiovascular: Normal rate, regular rhythm and normal heart sounds. No murmur heard. Pulmonary/Chest: Effort normal and breath sounds normal. No respiratory distress. He has no wheezes. He has no rales. Abdominal: Soft. Bowel sounds are normal. He exhibits no distension. There is no tenderness. Neurological: He is alert and oriented to person, place, and time. I personally reviewed the following lab results:   No results found for this or any previous visit (from the past 12 hour(s)). Assessment     60 y.o. male with:    ICD-10-CM ICD-9-CM    1. Controlled type 2 diabetes mellitus without complication, without long-term current use of insulin ([facility]) E11.9 250.00 HEMOGLOBIN A1C WITH EAG      MICROALBUMIN, UR, RAND W/ MICROALB/CREAT RATIO   2. Encounter for immunization Z23 V03.89 INFLUENZA VIRUS VAC QUAD,SPLIT,PRESV FREE SYRINGE IM      MI IMMUNIZ ADMIN,1 SINGLE/COMB VAC/TOXOID   3. Essential hypertension I10 401.9               Plan       Orders Placed This Encounter    MI IMMUNIZ ADMIN,1 SINGLE/COMB VAC/TOXOID    INFLUENZA VIRUS VACCINE QUADRIVALENT, PRESERVATIVE FREE SYRINGE (16576)    HEMOGLOBIN A1C WITH EAG    MICROALBUMIN, UR, RAND W/ MICROALB/CREAT RATIO     HTN - slightly above goal. Previously well controlled on current dosage.  Discussed options with patient, would like to work on diet and exercise first. Pt agrees to check BPs at home regularly, and if they remain consistently in the 608M-612C systolic, will schedule f/u appt in 3 mos. If they improve to goal BP < 140/90, ok to f/u in 6 months. DMII - At goal per last A1c (6.7 in 1/2018)  - Continue glyburide/metformin 5/500 BID  - A1c today  - Urine microalbumin today   - UTD on diabetic eye exam and diabetic foot exam, does daily foot checks    Urinary hesitancy - resolved. Followed by [facility]. Follow up 5/2019 for CPE  Flu vaccine also administered today. Patient discussed with Dr. Simon Monsivais (Attending Physician)    I have discussed the diagnosis with the patient and the intended plan as seen in the above orders. The patient has received an after-visit summary and questions were answered concerning future plans. I have discussed medication side effects and warnings with the patient as well.     Carlos Lancaster MD  Family Medicine Resident  PGY-2

## 2018-11-14 LAB
ALBUMIN/CREAT UR: 5.4 MG/G CREAT (ref 0–30)
CREAT UR-MCNC: 165.1 MG/DL
EST. AVERAGE GLUCOSE BLD GHB EST-MCNC: 166 MG/DL
HBA1C MFR BLD: 7.4 % (ref 4.8–5.6)
Lab: NORMAL
MICROALBUMIN UR-MCNC: 8.9 UG/ML

## 2018-11-20 NOTE — PROGRESS NOTES
Urine microalbumin/creatinine ratio 5.4 (<30)  A1c 7.4, increased from previous 6.7 (1/2018)    Will send [employer] message

## 2018-12-12 ENCOUNTER — PATIENT MESSAGE (OUTPATIENT)
Dept: FAMILY MEDICINE CLINIC | Age: 63
End: 2018-12-12

## 2018-12-12 DIAGNOSIS — E78.2 MIXED HYPERLIPIDEMIA: ICD-10-CM

## 2018-12-12 RX ORDER — ATORVASTATIN CALCIUM 40 MG/1
TABLET, FILM COATED ORAL
Qty: 90 TAB | Refills: 2 | Status: SHIPPED | OUTPATIENT
Start: 2018-12-12 | End: 2019-11-13 | Stop reason: SDUPTHER

## 2019-01-07 DIAGNOSIS — R39.198 DIFFICULTY URINATING: ICD-10-CM

## 2019-01-08 RX ORDER — TAMSULOSIN HYDROCHLORIDE 0.4 MG/1
0.4 CAPSULE ORAL DAILY
Qty: 90 CAP | Refills: 0 | Status: SHIPPED | OUTPATIENT
Start: 2019-01-08 | End: 2019-11-13

## 2019-02-05 DIAGNOSIS — E11.9 TYPE 2 DIABETES MELLITUS WITHOUT COMPLICATION, WITHOUT LONG-TERM CURRENT USE OF INSULIN (HCC): Primary | ICD-10-CM

## 2019-02-05 DIAGNOSIS — I10 ESSENTIAL HYPERTENSION: ICD-10-CM

## 2019-02-05 RX ORDER — GLYBURIDE-METFORMIN HYDROCHLORIDE 5; 500 MG/1; MG/1
1 TABLET ORAL 2 TIMES DAILY WITH MEALS
Qty: 180 TAB | Refills: 1 | Status: SHIPPED | OUTPATIENT
Start: 2019-02-05 | End: 2019-08-12 | Stop reason: SDUPTHER

## 2019-02-05 RX ORDER — LISINOPRIL 10 MG/1
TABLET ORAL
Qty: 90 TAB | Refills: 1 | Status: SHIPPED | OUTPATIENT
Start: 2019-02-05 | End: 2019-08-12 | Stop reason: SDUPTHER

## 2019-11-12 NOTE — PROGRESS NOTES
HPI       Chief Complaint: CPE     Jorge Luis Flower is a 58 y.o. male who presents for CPE     HTN - lisinopril 10mg daily, ASA 81mg daily. 144/85 today. Has been on the lisinopril 10 x 1-2 years, BPs in the mid 140s. Checks about once/week. Says 80% of the time it's in the 140s or low 150s, but very occasionally may go down to 135. DMII  - A1c 7.4 (11/2018). Glyburide/metformin 5/500 BID. Home BG readings are \"high,\" says 50% of the time is 120s-130s, but the other half of the time is in the high 100s (max 180s). No recent diet change. - Diet: Les May is the issue,\" eats a lot of sandwiches for lunch, a lot of bread. Breakfast is scrambled egg and sausage every morning. Eats a lot of vegetables, spinach, meat for dinner.   - Last diabetic eye exam - Spring of this year. Mom has glaucoma so he was checked extra closely but no signs of diabetic damage or glaucoma. - Checks feet daily, lotions daily, last diabetic foot exam 5/2018. Due for repeat today. - Last urine microalb 11/2018 (<30), due for repeat today   - Due for fasting lipid panel     HLD - lipitor 40mg daily, though he ran out 2 months ago. Last lipid panel 5/2018. Vitamin D deficiency - on supplementation. No level in our system. Finger pain - R ring finger was \"pulled\" while he was bowling in the Spring. Was minimally swollen at the time. Pain is now resolved but at night it feels \"tight\" at nighttime only. During the day it does not bother him. It feels \"tense, puffy\" at night regardless of the positioning of his arm. No numbness/tingling. Says it does not affect his golf game. Health Maintenance  - Flu vaccine - would like to do today   - Shingrix vaccine - pt would like script for this today   - Colonoscopy - 2017, next due 2022.      PMHx:  Past Medical History:   Diagnosis Date    Hypercholesterolemia     Hypertension     Nonspecific abnormal electrocardiogram (ECG) (EKG) 3/17/2011    Other dyspnea and respiratory abnormality 3/17/2011    Pre-operative cardiovascular examination 3/17/2011    Type II or unspecified type diabetes mellitus without mention of complication, not stated as uncontrolled 3/17/2011     Meds:   Current Outpatient Medications   Medication Sig Dispense Refill    atorvastatin (LIPITOR) 40 mg tablet Take 1 tablet daily by mouth 90 Tab 3    glyBURIDE-metFORMIN (GLUCOVANCE) 5-500 mg per tablet Take 1 Tab by mouth two (2) times daily (with meals). 180 Tab 3    lisinopril (PRINIVIL, ZESTRIL) 20 mg tablet TAKE 1 TABLET(10 MG) BY MOUTH EVERY DAY 90 Tab 0    varicella-zoster recombinant, PF, (SHINGRIX, PF,) 50 mcg/0.5 mL susr injection First dose administer today. 2nd dose to be administered 2-6 mos after initial dose. 0.5 mL 1    cholecalciferol (VITAMIN D3) 1,000 unit cap Take  by mouth two (2) times a day.  BABY ASPIRIN PO Take 81 mg by mouth daily.  ONETOUCH ULTRA TEST strip USE UTD TO CHECK BID  3    ONETOUCH ULTRA2 monitoring kit USE UTD  0    ciclopirox (PENLAC) 8 % solution as needed. 3    co-enzyme Q-10 (CO Q-10) 100 mg capsule TAKE 1 CAPSULE BY MOUTH EVERY DAY BEFORE A MEAL      ONE TOUCH DELICA 33 gauge misc USE FOR TESTING  1     Allergies:   No Known Allergies    Smoker:  Social History     Tobacco Use   Smoking Status Former Smoker   Smokeless Tobacco Never Used     ETOH:   Social History     Substance and Sexual Activity   Alcohol Use Yes    Comment: ocasional     FH:   Family History   Problem Relation Age of Onset    Elevated Lipids Father      ROS  Review of Systems   Constitutional: Negative for chills, fever and weight loss. Eyes: Negative for blurred vision and double vision. Respiratory: Negative for cough, shortness of breath and wheezing. Cardiovascular: Negative for chest pain and palpitations. Gastrointestinal: Negative for abdominal pain, constipation, diarrhea, nausea and vomiting. Genitourinary: Negative for dysuria, frequency and urgency.    Musculoskeletal: R ring finger stiffness   Neurological: Negative for dizziness, weakness and headaches. Physical Exam:  Visit Vitals  /85 (BP 1 Location: Right arm, BP Patient Position: Sitting)   Pulse 63   Temp 97.4 °F (36.3 °C) (Oral)   Resp 18   Ht 6' (1.829 m)   Wt 260 lb 6.4 oz (118.1 kg)   SpO2 96%   BMI 35.32 kg/m²       Wt Readings from Last 3 Encounters:   11/13/19 260 lb 6.4 oz (118.1 kg)   11/13/18 261 lb 12.8 oz (118.8 kg)   06/28/18 255 lb (115.7 kg)     BP Readings from Last 3 Encounters:   11/13/19 144/85   11/13/18 (!) 142/92   06/28/18 126/85      Physical Exam   Constitutional: He is oriented to person, place, and time. He appears well-developed. HENT:   Head: Normocephalic and atraumatic. Eyes: Pupils are equal, round, and reactive to light. EOM are normal.   Neck: Normal range of motion. Neck supple. No thyromegaly present. Cardiovascular: Normal rate, regular rhythm and normal heart sounds. No murmur heard. Pulmonary/Chest: Effort normal and breath sounds normal. No respiratory distress. He has no wheezes. He has no rales. Abdominal: Soft. Bowel sounds are normal. He exhibits no distension. There is no tenderness. Musculoskeletal:   R ring finger normal in appearance. No tenderness over MCP, DIP, or PIP. States area of \"tension\" is between DIP and MCP. Currently nontender. No erythema or swelling. No tenderness over MCP. Neurological: He is alert and oriented to person, place, and time. Normal diabetic foot exam          Assessment     58 y.o. male with:    ICD-10-CM ICD-9-CM    1. Type 2 diabetes mellitus without complication, without long-term current use of insulin ([facility]) E11.9 250.00 HM DIABETES FOOT EXAM      MICROALBUMIN, UR, RAND W/ MICROALB/CREAT RATIO      HEMOGLOBIN A1C WITH EAG      glyBURIDE-metFORMIN (GLUCOVANCE) 5-500 mg per tablet      DISCONTINUED: glyBURIDE-metFORMIN (GLUCOVANCE) 5-500 mg per tablet   2.  Essential hypertension I10 401.9 CBC W/O DIFF      METABOLIC PANEL, COMPREHENSIVE      lisinopril (PRINIVIL, ZESTRIL) 20 mg tablet      DISCONTINUED: lisinopril (PRINIVIL, ZESTRIL) 20 mg tablet   3. Mixed hyperlipidemia E78.2 272.2 LIPID PANEL      atorvastatin (LIPITOR) 40 mg tablet      DISCONTINUED: atorvastatin (LIPITOR) 40 mg tablet   4. Vitamin D deficiency E55.9 268.9 VITAMIN D, 25 HYDROXY   5. Encounter for immunization Z23 V03.89 INFLUENZA VIRUS VAC QUAD,SPLIT,PRESV FREE SYRINGE IM      [place] IMMUNIZ ADMIN,1 SINGLE/COMB VAC/TOXOID   6. Need for shingles vaccine Z23 V04.89 varicella-zoster recombinant, PF, (SHINGRIX, PF,) 50 mcg/0.5 mL susr injection   7. Finger stiffness, right M25.641 719.54               Plan     1. Type 2 diabetes mellitus without complication, without long-term current use of insulin ([facility])  Continue current meds, labs today  -  DIABETES FOOT EXAM  - MICROALBUMIN, UR, RAND W/ MICROALB/CREAT RATIO; Future  - HEMOGLOBIN A1C WITH EAG; Future  - glyBURIDE-metFORMIN (GLUCOVANCE) 5-500 mg per tablet; Take 1 Tab by mouth two (2) times daily (with meals). Dispense: 180 Tab; Refill: 3    2. Essential hypertension  Labs today. Increase lisinopril from 10mg to 20mg daily as BPs not at goal (both in office and at home). Pt to increase home checks to every few days, f/u in 2 mo  - CBC W/O DIFF; Future  - METABOLIC PANEL, COMPREHENSIVE; Future  - lisinopril (PRINIVIL, ZESTRIL) 20 mg tablet; TAKE 1 TABLET(10 MG) BY MOUTH EVERY DAY  Dispense: 90 Tab; Refill: 0    3. Mixed hyperlipidemia  Fasting labs today  - LIPID PANEL; Future  - atorvastatin (LIPITOR) 40 mg tablet; Take 1 tablet daily by mouth  Dispense: 90 Tab; Refill: 3    4. Vitamin D deficiency  Labs today  - VITAMIN D, 25 HYDROXY; Future    5. Encounter for immunization  Flu vaccine today  - INFLUENZA VIRUS VAC QUAD,SPLIT,PRESV FREE SYRINGE IM  - [place] IMMUNIZ ADMIN,1 SINGLE/COMB VAC/TOXOID    6.  Need for shingles vaccine  Physical script provided  - varicella-zoster recombinant, PF, (SHINGRIX, PF,) 50 mcg/0.5 mL susr injection; First dose administer today. 2nd dose to be administered 2-6 mos after initial dose. Dispense: 0.5 mL; Refill: 1    7. Finger stiffness, right  Advised no additional evaluation needed at this point. Continue to monitor. Follow-up and Dispositions    · Return in about 1 month (around 12/13/2019) for blood pressure check. Patient discussed with Dr. Bishnu Arellano (attending physician)    I have discussed the diagnosis with the patient and the intended plan as seen in the above orders. The patient has received an after-visit summary and questions were answered concerning future plans. I have discussed medication side effects and warnings with the patient as well.     Iliana Chaparro MD  Family Medicine Resident  PGY-3

## 2019-11-13 ENCOUNTER — OFFICE VISIT (OUTPATIENT)
Dept: FAMILY MEDICINE CLINIC | Age: 64
End: 2019-11-13

## 2019-11-13 ENCOUNTER — HOSPITAL ENCOUNTER (OUTPATIENT)
Dept: LAB | Age: 64
Discharge: HOME | End: 2019-11-13

## 2019-11-13 VITALS
TEMPERATURE: 97.4 F | OXYGEN SATURATION: 96 % | HEART RATE: 63 BPM | WEIGHT: 260.4 LBS | DIASTOLIC BLOOD PRESSURE: 85 MMHG | SYSTOLIC BLOOD PRESSURE: 144 MMHG | HEIGHT: 72 IN | RESPIRATION RATE: 18 BRPM | BODY MASS INDEX: 35.27 KG/M2

## 2019-11-13 DIAGNOSIS — E78.2 MIXED HYPERLIPIDEMIA: ICD-10-CM

## 2019-11-13 DIAGNOSIS — E11.9 TYPE 2 DIABETES MELLITUS WITHOUT COMPLICATION, WITHOUT LONG-TERM CURRENT USE OF INSULIN (HCC): ICD-10-CM

## 2019-11-13 DIAGNOSIS — E55.9 VITAMIN D DEFICIENCY: ICD-10-CM

## 2019-11-13 DIAGNOSIS — I10 ESSENTIAL HYPERTENSION: ICD-10-CM

## 2019-11-13 DIAGNOSIS — E11.9 TYPE 2 DIABETES MELLITUS WITHOUT COMPLICATION, WITHOUT LONG-TERM CURRENT USE OF INSULIN (HCC): Primary | ICD-10-CM

## 2019-11-13 DIAGNOSIS — Z23 NEED FOR SHINGLES VACCINE: ICD-10-CM

## 2019-11-13 DIAGNOSIS — M25.641 FINGER STIFFNESS, RIGHT: ICD-10-CM

## 2019-11-13 DIAGNOSIS — Z23 ENCOUNTER FOR IMMUNIZATION: ICD-10-CM

## 2019-11-13 LAB
25(OH)D3 SERPL-MCNC: 42.8 NG/ML (ref 30–100)
ALBUMIN SERPL-MCNC: 4.3 G/DL (ref 3.5–5)
ALBUMIN/GLOB SERPL: 1.4 {RATIO} (ref 1.1–2.2)
ALP SERPL-CCNC: 78 U/L (ref 45–117)
ALT SERPL-CCNC: 94 U/L (ref 12–78)
ANION GAP SERPL CALC-SCNC: 7 MMOL/L (ref 5–15)
AST SERPL-CCNC: 62 U/L (ref 15–37)
BILIRUB SERPL-MCNC: 0.7 MG/DL (ref 0.2–1)
BUN SERPL-MCNC: 17 MG/DL (ref 6–20)
BUN/CREAT SERPL: 14 (ref 12–20)
CALCIUM SERPL-MCNC: 9 MG/DL (ref 8.5–10.1)
CHLORIDE SERPL-SCNC: 105 MMOL/L (ref 97–108)
CHOLEST SERPL-MCNC: 293 MG/DL
CO2 SERPL-SCNC: 24 MMOL/L (ref 21–32)
CREAT SERPL-MCNC: 1.21 MG/DL (ref 0.7–1.3)
ERYTHROCYTE [DISTWIDTH] IN BLOOD BY AUTOMATED COUNT: 13.3 % (ref 11.5–14.5)
EST. AVERAGE GLUCOSE BLD GHB EST-MCNC: 166 MG/DL
GLOBULIN SER CALC-MCNC: 3.1 G/DL (ref 2–4)
GLUCOSE SERPL-MCNC: 147 MG/DL (ref 65–100)
HBA1C MFR BLD: 7.4 % (ref 4.2–6.3)
HCT VFR BLD AUTO: 50.2 % (ref 36.6–50.3)
HDLC SERPL-MCNC: 36 MG/DL
HDLC SERPL: 8.1 {RATIO} (ref 0–5)
HGB BLD-MCNC: 15.7 G/DL (ref 12.1–17)
LDLC SERPL CALC-MCNC: 196.8 MG/DL (ref 0–100)
LIPID PROFILE,FLP: ABNORMAL
MCH RBC QN AUTO: 28.8 PG (ref 26–34)
MCHC RBC AUTO-ENTMCNC: 31.3 G/DL (ref 30–36.5)
MCV RBC AUTO: 91.9 FL (ref 80–99)
NRBC # BLD: 0 K/UL (ref 0–0.01)
NRBC BLD-RTO: 0 PER 100 WBC
PLATELET # BLD AUTO: 150 K/UL (ref 150–400)
PMV BLD AUTO: 10.1 FL (ref 8.9–12.9)
POTASSIUM SERPL-SCNC: 4.6 MMOL/L (ref 3.5–5.1)
PROT SERPL-MCNC: 7.4 G/DL (ref 6.4–8.2)
RBC # BLD AUTO: 5.46 M/UL (ref 4.1–5.7)
SODIUM SERPL-SCNC: 136 MMOL/L (ref 136–145)
TRIGL SERPL-MCNC: 301 MG/DL (ref ?–150)
VLDLC SERPL CALC-MCNC: 60.2 MG/DL
WBC # BLD AUTO: 6.1 K/UL (ref 4.1–11.1)

## 2019-11-13 RX ORDER — GLYBURIDE-METFORMIN HYDROCHLORIDE 5; 500 MG/1; MG/1
1 TABLET ORAL 2 TIMES DAILY WITH MEALS
Qty: 180 TAB | Refills: 3 | Status: SHIPPED | OUTPATIENT
Start: 2019-11-13 | End: 2019-11-13

## 2019-11-13 RX ORDER — ATORVASTATIN CALCIUM 40 MG/1
TABLET, FILM COATED ORAL
Qty: 90 TAB | Refills: 3 | Status: SHIPPED | OUTPATIENT
Start: 2019-11-13 | End: 2019-11-13

## 2019-11-13 RX ORDER — GLYBURIDE-METFORMIN HYDROCHLORIDE 5; 500 MG/1; MG/1
1 TABLET ORAL 2 TIMES DAILY WITH MEALS
Qty: 180 TAB | Refills: 3 | Status: SHIPPED | OUTPATIENT
Start: 2019-11-13 | End: 2020-11-09

## 2019-11-13 RX ORDER — LISINOPRIL 20 MG/1
TABLET ORAL
Qty: 90 TAB | Refills: 0 | Status: SHIPPED | OUTPATIENT
Start: 2019-11-13 | End: 2019-11-13

## 2019-11-13 RX ORDER — LISINOPRIL 20 MG/1
TABLET ORAL
Qty: 90 TAB | Refills: 0 | Status: SHIPPED | OUTPATIENT
Start: 2019-11-13 | End: 2019-12-11 | Stop reason: SDUPTHER

## 2019-11-13 RX ORDER — ATORVASTATIN CALCIUM 40 MG/1
TABLET, FILM COATED ORAL
Qty: 90 TAB | Refills: 3 | Status: SHIPPED | OUTPATIENT
Start: 2019-11-13 | End: 2020-12-11 | Stop reason: SDUPTHER

## 2019-11-13 NOTE — PATIENT INSTRUCTIONS
Start checking your blood pressure once every few days. Bring your log to your next appointment. DASH Diet: Care Instructions  Your Care Instructions    The DASH diet is an eating plan that can help lower your blood pressure. DASH stands for Dietary Approaches to Stop Hypertension. Hypertension is high blood pressure. The DASH diet focuses on eating foods that are high in calcium, potassium, and magnesium. These nutrients can lower blood pressure. The foods that are highest in these nutrients are fruits, vegetables, low-fat dairy products, nuts, seeds, and legumes. But taking calcium, potassium, and magnesium supplements instead of eating foods that are high in those nutrients does not have the same effect. The DASH diet also includes whole grains, fish, and poultry. The DASH diet is one of several lifestyle changes your doctor may recommend to lower your high blood pressure. Your doctor may also want you to decrease the amount of sodium in your diet. Lowering sodium while following the DASH diet can lower blood pressure even further than just the DASH diet alone. Follow-up care is a key part of your treatment and safety. Be sure to make and go to all appointments, and call your doctor if you are having problems. It's also a good idea to know your test results and keep a list of the medicines you take. How can you care for yourself at home? Following the DASH diet  · Eat 4 to 5 servings of fruit each day. A serving is 1 medium-sized piece of fruit, ½ cup chopped or canned fruit, 1/4 cup dried fruit, or 4 ounces (½ cup) of fruit juice. Choose fruit more often than fruit juice. · Eat 4 to 5 servings of vegetables each day. A serving is 1 cup of lettuce or raw leafy vegetables, ½ cup of chopped or cooked vegetables, or 4 ounces (½ cup) of vegetable juice. Choose vegetables more often than vegetable juice. · Get 2 to 3 servings of low-fat and fat-free dairy each day.  A serving is 8 ounces of milk, 1 cup of yogurt, or 1 ½ ounces of cheese. · Eat 6 to 8 servings of grains each day. A serving is 1 slice of bread, 1 ounce of dry cereal, or ½ cup of cooked rice, pasta, or cooked cereal. Try to choose whole-grain products as much as possible. · Limit lean meat, poultry, and fish to 2 servings each day. A serving is 3 ounces, about the size of a deck of cards. · Eat 4 to 5 servings of nuts, seeds, and legumes (cooked dried beans, lentils, and split peas) each week. A serving is 1/3 cup of nuts, 2 tablespoons of seeds, or ½ cup of cooked beans or peas. · Limit fats and oils to 2 to 3 servings each day. A serving is 1 teaspoon of vegetable oil or 2 tablespoons of salad dressing. · Limit sweets and added sugars to 5 servings or less a week. A serving is 1 tablespoon jelly or jam, ½ cup sorbet, or 1 cup of lemonade. · Eat less than 2,300 milligrams (mg) of sodium a day. If you limit your sodium to 1,500 mg a day, you can lower your blood pressure even more. Tips for success  · Start small. Do not try to make dramatic changes to your diet all at once. You might feel that you are missing out on your favorite foods and then be more likely to not follow the plan. Make small changes, and stick with them. Once those changes become habit, add a few more changes. · Try some of the following:  ? Make it a goal to eat a fruit or vegetable at every meal and at snacks. This will make it easy to get the recommended amount of fruits and vegetables each day. ? Try yogurt topped with fruit and nuts for a snack or healthy dessert. ? Add lettuce, tomato, cucumber, and onion to sandwiches. ? Combine a ready-made pizza crust with low-fat mozzarella cheese and lots of vegetable toppings. Try using tomatoes, squash, spinach, broccoli, carrots, cauliflower, and onions. ? Have a variety of cut-up vegetables with a low-fat dip as an appetizer instead of chips and dip. ? Sprinkle sunflower seeds or chopped almonds over salads.  Or try adding chopped walnuts or almonds to cooked vegetables. ? Try some vegetarian meals using beans and peas. Add garbanzo or kidney beans to salads. Make burritos and tacos with mashed stanley beans or black beans. Where can you learn more? Go to http://roberto carlos-chidi.info/. Enter N376 in the search box to learn more about \"DASH Diet: Care Instructions. \"  Current as of: April 9, 2019  Content Version: 12.2  © 9279-9142 [employer]. Care instructions adapted under license by [employer] (which disclaims liability or warranty for this information). If you have questions about a medical condition or this instruction, always ask your healthcare professional. [address] any warranty or liability for your use of this information.

## 2019-11-13 NOTE — PROGRESS NOTES
Huber Saldivar is a 58 y.o. male    Patient would like to discuss right ring finger, problems at night (tightness, stuff)  Patient states over view months back while bowling   Chief Complaint   Patient presents with    Complete Physical       1. Have you been to the ER, urgent care clinic since your last visit? Hospitalized since your last visit? No  M  2. Have you seen or consulted any other health care providers outside of the [address] [place] [address] since your last visit? Include any pap smears or colon screening. No      Visit Vitals  /85 (BP 1 Location: Right arm, BP Patient Position: Sitting)   Pulse 63   Temp 97.4 °F (36.3 °C) (Oral)   Resp 18   Ht 6' (1.829 m)   Wt 260 lb 6.4 oz (118.1 kg)   SpO2 96%   BMI 35.32 kg/m²           Health Maintenance Due   Topic Date Due    EYE EXAM RETINAL OR DILATED  11/28/1964    Shingrix Vaccine Age 50> (1 of 2) 11/28/2004    FOBT Q 1 YEAR AGE 50-75  11/28/2004    LIPID PANEL Q1  05/04/2019    FOOT EXAM Q1  05/10/2019    HEMOGLOBIN A1C Q6M  05/13/2019    Influenza Age 9 to Adult  08/01/2019    MICROALBUMIN Q1  11/13/2019         Medication Reconciliation completed, changes noted.   Please  Update medication list.

## 2019-11-19 NOTE — PROGRESS NOTES
Vitamin D wnl   A1c at 7.4, stable compared to 1 year ago  Lipid panel is elevated - LDL extremely above goal in patient with known DMII - he is on lipitor 40mg daily but had been out for several months before this lipid panel was drawn. Will continue to monitor.    CMP with elevated ALT and AST, will need to follow   CBC ok    Would like to recheck labs in 3-6 mos  Sending [employer] message

## 2019-12-10 NOTE — PROGRESS NOTES
HPI       Chief Complaint: HTN f/u     Kym Salvador is a 71 y.o. male who presents for HTN follow up. Had CPE about 1 mo ago. HTN - last visit 11/2019, BP was not at goal, lisinopril was increased from 10mg to 20mg daily. Home BP readings improved recently, now 130s-140s/60s-70s. Has not had any readings > 140/90 in the past 2 weeks. No vision changes, severe headaches, dizziness, lightheadedness, nausea/vomiting. No other concerns today. PMHx:  Past Medical History:   Diagnosis Date    Hypercholesterolemia     Hypertension     Nonspecific abnormal electrocardiogram (ECG) (EKG) 3/17/2011    Other dyspnea and respiratory abnormality 3/17/2011    Pre-operative cardiovascular examination 3/17/2011    Type II or unspecified type diabetes mellitus without mention of complication, not stated as uncontrolled 3/17/2011     Meds:   Current Outpatient Medications   Medication Sig Dispense Refill    lisinopril (PRINIVIL, ZESTRIL) 20 mg tablet TAKE 1 TABLET(20 MG) BY MOUTH EVERY DAY 90 Tab 2    atorvastatin (LIPITOR) 40 mg tablet Take 1 tablet daily by mouth 90 Tab 3    glyBURIDE-metFORMIN (GLUCOVANCE) 5-500 mg per tablet Take 1 Tab by mouth two (2) times daily (with meals). 180 Tab 3    cholecalciferol (VITAMIN D3) 1,000 unit cap Take  by mouth two (2) times a day.  BABY ASPIRIN PO Take 81 mg by mouth daily.  ONETOUCH ULTRA TEST strip USE UTD TO CHECK BID  3    ONETOUCH ULTRA2 monitoring kit USE UTD  0    ONE TOUCH DELICA 33 gauge misc USE FOR TESTING  1    varicella-zoster recombinant, PF, (SHINGRIX, PF,) 50 mcg/0.5 mL susr injection First dose administer today. 2nd dose to be administered 2-6 mos after initial dose. 0.5 mL 1    ciclopirox (PENLAC) 8 % solution as needed.   3    co-enzyme Q-10 (CO Q-10) 100 mg capsule TAKE 1 CAPSULE BY MOUTH EVERY DAY BEFORE A MEAL       Allergies:   No Known Allergies    ROS  Review of Systems   Constitutional: Negative for chills, fever and weight loss.   HENT: Negative for congestion, sinus pain and sore throat. Eyes: Negative for blurred vision and double vision. Respiratory: Negative for cough, shortness of breath and wheezing. Cardiovascular: Negative for chest pain and palpitations. Gastrointestinal: Negative for abdominal pain, constipation, diarrhea, nausea and vomiting. Neurological: Negative for dizziness, weakness and headaches. Physical Exam:  Visit Vitals  /71   Pulse 66   Temp 97.7 °F (36.5 °C) (Oral)   Resp 16   Ht 6' (1.829 m)   Wt 259 lb (117.5 kg)   SpO2 94%   BMI 35.13 kg/m²       Wt Readings from Last 3 Encounters:   12/11/19 259 lb (117.5 kg)   11/13/19 260 lb 6.4 oz (118.1 kg)   11/13/18 261 lb 12.8 oz (118.8 kg)     BP Readings from Last 3 Encounters:   12/11/19 127/71   11/13/19 144/85   11/13/18 (!) 142/92      Physical Exam  Constitutional:       Appearance: He is well-developed. HENT:      Head: Normocephalic and atraumatic. Eyes:      Pupils: Pupils are equal, round, and reactive to light. Neck:      Musculoskeletal: Normal range of motion and neck supple. Thyroid: No thyromegaly. Cardiovascular:      Rate and Rhythm: Normal rate and regular rhythm. Heart sounds: Normal heart sounds. No murmur. Pulmonary:      Effort: Pulmonary effort is normal. No respiratory distress. Breath sounds: Normal breath sounds. No wheezing or rales. Abdominal:      General: Bowel sounds are normal. There is no distension. Palpations: Abdomen is soft. Tenderness: There is no tenderness. Neurological:      Mental Status: He is alert and oriented to person, place, and time. Assessment     71 y.o. male with:    ICD-10-CM ICD-9-CM    1. Essential hypertension I10 401.9 lisinopril (PRINIVIL, ZESTRIL) 20 mg tablet            Plan     1. Essential hypertension  BP now at goal on lisinopril 20mg daily. Continue current medications. F/u in 6 mos or sooner PRN.    - lisinopril (PRINIVIL, ZESTRIL) 20 mg tablet; TAKE 1 TABLET(20 MG) BY MOUTH EVERY DAY  Dispense: 90 Tab; Refill: 2      Follow-up and Dispositions    · Return in about 6 months (around 6/11/2020) for follow up HTN, DMII, cholesterol (come fasting). Patient discussed with Dr. Ajit Anguiano (attending physician)    I have discussed the diagnosis with the patient and the intended plan as seen in the above orders. The patient has received an after-visit summary and questions were answered concerning future plans. I have discussed medication side effects and warnings with the patient as well.     Bobby Villanueva MD  Family Medicine Resident  PGY-3

## 2019-12-11 ENCOUNTER — OFFICE VISIT (OUTPATIENT)
Dept: FAMILY MEDICINE CLINIC | Age: 64
End: 2019-12-11

## 2019-12-11 VITALS
SYSTOLIC BLOOD PRESSURE: 127 MMHG | OXYGEN SATURATION: 94 % | DIASTOLIC BLOOD PRESSURE: 71 MMHG | HEIGHT: 72 IN | WEIGHT: 259 LBS | HEART RATE: 66 BPM | BODY MASS INDEX: 35.08 KG/M2 | TEMPERATURE: 97.7 F | RESPIRATION RATE: 16 BRPM

## 2019-12-11 DIAGNOSIS — I10 ESSENTIAL HYPERTENSION: ICD-10-CM

## 2019-12-11 RX ORDER — LISINOPRIL 20 MG/1
TABLET ORAL
Qty: 90 TAB | Refills: 2 | Status: SHIPPED | OUTPATIENT
Start: 2019-12-11 | End: 2020-12-11 | Stop reason: SDUPTHER

## 2019-12-11 NOTE — PROGRESS NOTES
Chief Complaint   Patient presents with    Hypertension     1. Have you been to the ER, urgent care clinic since your last visit? Hospitalized since your last visit? No    2. Have you seen or consulted any other health care providers outside of the [address] [place] [address] since your last visit? Include any pap smears or colon screening.  No

## 2019-12-11 NOTE — PATIENT INSTRUCTIONS
Your goal blood pressure is less than 140/90. Continue taking one 20mg tablet of lisinopril daily        Home Blood Pressure Test: About This Test  What is it? A home blood pressure test allows you to keep track of your blood pressure at home. Blood pressure is a measure of the force of blood against the walls of your arteries. Blood pressure readings include two numbers, such as 130/80 (say \"130 over 80\"). The first number is the systolic pressure. The second number is the diastolic pressure. Why is this test done? You may do this test at home to:  · Find out if you have high blood pressure. · Track your blood pressure if you have high blood pressure. · Track how well medicine is working to reduce high blood pressure. · Check how lifestyle changes, such as weight loss and exercise, are affecting blood pressure. How can you prepare for the test?  · Do not use caffeine, tobacco, or medicines known to raise blood pressure (such as nasal decongestant sprays) for at least 30 minutes before taking your blood pressure. · Do not exercise for at least 30 minutes before taking your blood pressure. What happens before the test?  Take your blood pressure while you feel comfortable and relaxed. Sit quietly with both feet on the floor for at least 5 minutes before the test.  What happens during the test?  · Sit with your arm slightly bent and resting on a table so that your upper arm is at the same level as your heart. · Roll up your sleeve or take off your shirt to expose your upper arm. · Wrap the blood pressure cuff around your upper arm so that the lower edge of the cuff is about 1 inch above the bend of your elbow. Proceed with the following steps depending on if you are using an automatic or manual pressure monitor. Automatic blood pressure monitors  · Press the on/off button on the automatic monitor and wait until the ready-to-measure \"heart\" symbol appears next to zero in the display window.   · Press the start button. The cuff will inflate and deflate by itself. · Your blood pressure numbers will appear on the screen. · Write your numbers in your log book, along with the date and time. Manual blood pressure monitors  · Place the earpieces of a stethoscope in your ears, and place the bell of the stethoscope over the artery, just below the cuff. · Close the valve on the rubber inflating bulb. · Squeeze the bulb rapidly with your opposite hand to inflate the cuff until the dial or column of mercury reads about 30 mm Hg higher than your usual systolic pressure. If you do not know your usual pressure, inflate the cuff to 210 mm Hg or until the pulse at your wrist disappears. · Open the pressure valve just slightly by twisting or pressing the valve on the bulb. · As you watch the pressure slowly fall, note the level on the dial at which you first start to hear a pulsing or tapping sound through the stethoscope. This is your systolic blood pressure. · Continue letting the air out slowly. The sounds will become muffled and will finally disappear. Note the pressure when the sounds completely disappear. This is your diastolic blood pressure. Let out all the remaining air. · Write your numbers in your log book, along with the date and time. What else should you know about the test?  It is more accurate to take the average of several readings made throughout the day than to rely on a single reading. It's normal for blood pressure to go up and down throughout the day. Follow-up care is a key part of your treatment and safety. Be sure to make and go to all appointments, and call your doctor if you are having problems. It's also a good idea to keep a list of the medicines you take. Where can you learn more? Go to http://roberto carlos-chidi.info/.   Enter C427 in the search box to learn more about \"Home Blood Pressure Test: About This Test.\"  Current as of: April 9, 2019  Content Version: 12.2  © 3286-5492 [employer]. Care instructions adapted under license by [employer] (which disclaims liability or warranty for this information). If you have questions about a medical condition or this instruction, always ask your healthcare professional. [address] any warranty or liability for your use of this information.

## 2020-11-04 DIAGNOSIS — E11.9 TYPE 2 DIABETES MELLITUS WITHOUT COMPLICATION, WITHOUT LONG-TERM CURRENT USE OF INSULIN (HCC): ICD-10-CM

## 2020-11-09 RX ORDER — GLYBURIDE-METFORMIN HYDROCHLORIDE 5; 500 MG/1; MG/1
TABLET ORAL
Qty: 60 TAB | Refills: 0 | Status: SHIPPED | OUTPATIENT
Start: 2020-11-09 | End: 2020-12-03

## 2020-12-02 DIAGNOSIS — E11.9 TYPE 2 DIABETES MELLITUS WITHOUT COMPLICATION, WITHOUT LONG-TERM CURRENT USE OF INSULIN (HCC): ICD-10-CM

## 2020-12-03 RX ORDER — GLYBURIDE-METFORMIN HYDROCHLORIDE 5; 500 MG/1; MG/1
TABLET ORAL
Qty: 60 TAB | Refills: 0 | Status: SHIPPED | OUTPATIENT
Start: 2020-12-03 | End: 2020-12-11 | Stop reason: SDUPTHER

## 2020-12-11 ENCOUNTER — VIRTUAL VISIT (OUTPATIENT)
Dept: FAMILY MEDICINE CLINIC | Age: 65
End: 2020-12-11

## 2020-12-11 DIAGNOSIS — E11.9 TYPE 2 DIABETES MELLITUS WITHOUT COMPLICATION, WITHOUT LONG-TERM CURRENT USE OF INSULIN (HCC): ICD-10-CM

## 2020-12-11 DIAGNOSIS — I10 ESSENTIAL HYPERTENSION: Primary | ICD-10-CM

## 2020-12-11 DIAGNOSIS — E78.2 MIXED HYPERLIPIDEMIA: ICD-10-CM

## 2020-12-11 PROCEDURE — 99214 OFFICE O/P EST MOD 30 MIN: CPT | Performed by: STUDENT IN AN ORGANIZED HEALTH CARE EDUCATION/TRAINING PROGRAM

## 2020-12-11 RX ORDER — LISINOPRIL 20 MG/1
TABLET ORAL
Qty: 90 TAB | Refills: 2 | Status: SHIPPED | OUTPATIENT
Start: 2020-12-11 | End: 2021-09-05

## 2020-12-11 RX ORDER — GLYBURIDE-METFORMIN HYDROCHLORIDE 5; 500 MG/1; MG/1
1 TABLET ORAL 2 TIMES DAILY WITH MEALS
Qty: 180 TAB | Refills: 1 | Status: SHIPPED | OUTPATIENT
Start: 2020-12-11 | End: 2020-12-18

## 2020-12-11 RX ORDER — ATORVASTATIN CALCIUM 40 MG/1
TABLET, FILM COATED ORAL
Qty: 90 TAB | Refills: 3 | Status: SHIPPED | OUTPATIENT
Start: 2020-12-11 | End: 2021-11-04 | Stop reason: SDUPTHER

## 2020-12-11 NOTE — PROGRESS NOTES
Hortencia Skiff  76 y.o. male  1955  [address]  [id number]   [address]:    Telemedicine Progress Note  Johnathan Whitfield DO       Encounter Date and Time: December 11, 2020 at 9:49 AM    Consent: Hortencia Skiff, who was seen by synchronous (real-time) audio-video technology, and/or his healthcare decision maker, is aware that this patient-initiated, Telehealth encounter on 12/11/2020 is a billable service, with coverage as determined by his insurance carrier. He is aware that he may receive a bill and has provided verbal consent to proceed: Yes. Chief Complaint   Patient presents with    Medication Refill     History of Present Illness   Hortencia Skiff is a 76 y.o. male was evaluated by synchronous (real-time) audio-video technology from home, through a secure patient portal.    DM2/HLD: Patient compliant with glyburide-metformin. Postprandial blood sugars average around 140's. Saw eye doctor this year approximately 6 months ago, no issues. Trying to work on [employer], but especially has trouble during lunchtime. Like to eat deli sandwiches, hamburgers, hot dogs, eggs, sausages. Does not particularly like vegetables except green beans and spinach. Exercises by walking his godoy retriever twice per day, approx 1 mile. Tries to also use his gym in the house. HTN: BP at home averages 130-140's/70's. Used to be 150's/80's. Compliant with lisinopril. HCM:   - Received flu vaccine this year. Review of Systems   Review of Systems   Constitutional: Negative for chills and fever. HENT: Negative for congestion and sore throat. Eyes: Negative for blurred vision. Respiratory: Negative for cough and shortness of breath. Cardiovascular: Negative for chest pain. Gastrointestinal: Negative for abdominal pain, diarrhea and vomiting. Genitourinary: Negative for dysuria.        Vitals/Objective:     General: alert, cooperative, no distress   Mental status: mental status: alert, oriented to person, place, and time, normal mood, behavior, speech, dress, motor activity, and thought processes   Resp: resp: normal effort and no respiratory distress   Neuro: neuro: no gross deficits   Skin: skin: no discoloration or lesions of concern on visible areas   Due to this being a TeleHealth evaluation, many elements of the physical examination are unable to be assessed. Assessment and Plan:   76 y.o. M presents for DM2, HLD, HTN follow up. 1. Type 2 diabetes mellitus without complication, without long-term current use of insulin ([facility] [address]): last A1c 7.4 (11/13/19)  - HEMOGLOBIN A1C WITH EAG; Future  - CBC W/O DIFF; Future  - METABOLIC PANEL, COMPREHENSIVE; Future  - MICROALBUMIN, UR, RAND W/ MICROALB/CREAT RATIO; Future  - glyBURIDE-metFORMIN (GLUCOVANCE) 5-500 mg per tablet; Take 1 Tab by mouth two (2) times daily (with meals). Dispense: 180 Tab; Refill: 1  - Encouraged patient to continue to work on diet and exercise and trial new vegetables. 2. Essential hypertension: stable. Home BP's 130-140's/70's  - lisinopriL (PRINIVIL, ZESTRIL) 20 mg tablet; TAKE 1 TABLET(20 MG) BY MOUTH EVERY DAY  Dispense: 90 Tab; Refill: 2    3. Mixed hyperlipidemia: last  (11/13/19)  - LIPID PANEL; Future  - atorvastatin (LIPITOR) 40 mg tablet; Take 1 tablet daily by mouth  Dispense: 90 Tab; Refill: 3      Time spent in direct conversation with the patient to include medical condition(s) discussed, assessment and treatment plan:       We discussed the expected course, resolution and complications of the diagnosis(es) in detail. Medication risks, benefits, costs, interactions, and alternatives were discussed as indicated. I advised him to contact the office if his condition worsens, changes or fails to improve as anticipated. He expressed understanding with the diagnosis(es) and plan.  Patient understands that this encounter was a temporary measure, and the importance of further follow up and examination was emphasized. Patient verbalized understanding. Patient informed to follow up: 3 months for MWV. Follow-up and Dispositions  ·   Return in about 3 months (around 3/11/2021) for Medicare Wellness exam.         Electronically Signed: Va Rodrigez DO    CPT Codes 56815-99902 for Established Patients may apply to this Telehealth Visit. POS code: 18. Modifier DEV Hinojosa is a 76 y.o. male who was evaluated by an audio-video encounter for concerns as above. Patient identification was verified prior to start of the visit. A caregiver was present when appropriate. Due to this being a TeleHealth encounter (During CKTIW-26 public health emergency), evaluation of the following organ systems was limited: Vitals/Constitutional/EENT/Resp/CV/GI//MS/Neuro/Skin/Heme-Lymph-Imm. Pursuant to the emergency declaration under the [facility], 1135 waiver authority and the [employer] Act, this Virtual Visit was conducted, with patient's (and/or legal guardian's) consent, to reduce the patient's risk of exposure to COVID-19 and provide necessary medical care. Services were provided through a synchronous discussion virtually to substitute for in-person clinic visit. I was at home. The patient was at home. History   Patients past medical, surgical and family histories were reviewed and updated. Past Medical History:   Diagnosis Date    Hypercholesterolemia     Hypertension     Nonspecific abnormal electrocardiogram (ECG) (EKG) 3/17/2011    Other dyspnea and respiratory abnormality 3/17/2011    Pre-operative cardiovascular examination 3/17/2011    Type II or unspecified type diabetes mellitus without mention of complication, not stated as uncontrolled 3/17/2011     No past surgical history on file.   Family History   Problem Relation Age of Onset    Elevated Lipids Father      Social History     Socioeconomic History    Marital status:      Spouse name: Not on file    Number of children: Not on file    Years of education: Not on file    Highest education level: Not on file   Occupational History    Not on file   Social Needs    Financial resource strain: Not on file    Food insecurity     Worry: Not on file     Inability: Not on file    Transportation needs     Medical: Not on file     Non-medical: Not on file   Tobacco Use    Smoking status: Former Smoker    Smokeless tobacco: Never Used   Substance and Sexual Activity    Alcohol use: Yes     Comment: ocasional    Drug use: No    Sexual activity: Not on file   Lifestyle    Physical activity     Days per week: Not on file     Minutes per session: Not on file    Stress: Not on file   Relationships    Social connections     Talks on phone: Not on file     Gets together: Not on file     Attends Ep[employer]alian service: Not on file     Active member of club or organization: Not on file     Attends meetings of clubs or organizations: Not on file     Relationship status: Not on file    Intimate partner violence     Fear of current or ex partner: Not on file     Emotionally abused: Not on file     Physically abused: Not on file     Forced sexual activity: Not on file   Other Topics Concern    Not on file   Social History Narrative    Not on file     Patient Active Problem List   Diagnosis Code    Mixed hyperlipidemia E78.2    Type II or unspecified type diabetes mellitus without mention of complication, not stated as uncontrolled E11.9    Nonspecific abnormal electrocardiogram (ECG) (EKG) R94.31    Other dyspnea and respiratory abnormality R06.09, R09.89    Essential hypertension I10          Current Medications/Allergies   Medications and Allergies reviewed:    Current Outpatient Medications   Medication Sig Dispense Refill    atorvastatin (LIPITOR) 40 mg tablet Take 1 tablet daily by mouth 90 Tab 3    glyBURIDE-metFORMIN (GLUCOVANCE) 5-500 mg per tablet Take 1 Tab by mouth two (2) times daily (with meals). 180 Tab 1    lisinopriL (PRINIVIL, ZESTRIL) 20 mg tablet TAKE 1 TABLET(20 MG) BY MOUTH EVERY DAY 90 Tab 2    varicella-zoster recombinant, PF, (SHINGRIX, PF,) 50 mcg/0.5 mL susr injection First dose administer today. 2nd dose to be administered 2-6 mos after initial dose. 0.5 mL 1    cholecalciferol (VITAMIN D3) 1,000 unit cap Take  by mouth two (2) times a day.  BABY ASPIRIN PO Take 81 mg by mouth daily.  ONETOUCH ULTRA TEST strip USE UTD TO CHECK BID  3    ONETOUCH ULTRA2 monitoring kit USE UTD  0    ciclopirox (PENLAC) 8 % solution as needed.   3    co-enzyme Q-10 (CO Q-10) 100 mg capsule TAKE 1 CAPSULE BY MOUTH EVERY DAY BEFORE A MEAL      ONE TOUCH DELICA 33 gauge misc USE FOR TESTING  1     No Known Allergies

## 2020-12-11 NOTE — PATIENT INSTRUCTIONS

## 2020-12-14 ENCOUNTER — LAB ONLY (OUTPATIENT)
Dept: FAMILY MEDICINE CLINIC | Age: 65
End: 2020-12-14

## 2020-12-14 DIAGNOSIS — E78.2 MIXED HYPERLIPIDEMIA: ICD-10-CM

## 2020-12-14 DIAGNOSIS — E11.9 TYPE 2 DIABETES MELLITUS WITHOUT COMPLICATION, WITHOUT LONG-TERM CURRENT USE OF INSULIN (HCC): ICD-10-CM

## 2020-12-14 LAB
ALBUMIN SERPL-MCNC: 4.1 G/DL (ref 3.5–5)
ALBUMIN/GLOB SERPL: 1.2 {RATIO} (ref 1.1–2.2)
ALP SERPL-CCNC: 90 U/L (ref 45–117)
ALT SERPL-CCNC: 62 U/L (ref 12–78)
ANION GAP SERPL CALC-SCNC: 1 MMOL/L (ref 5–15)
AST SERPL-CCNC: 38 U/L (ref 15–37)
BILIRUB SERPL-MCNC: 0.6 MG/DL (ref 0.2–1)
BUN SERPL-MCNC: 19 MG/DL (ref 6–20)
BUN/CREAT SERPL: 15 (ref 12–20)
CALCIUM SERPL-MCNC: 9.3 MG/DL (ref 8.5–10.1)
CHLORIDE SERPL-SCNC: 104 MMOL/L (ref 97–108)
CHOLEST SERPL-MCNC: 150 MG/DL
CO2 SERPL-SCNC: 29 MMOL/L (ref 21–32)
CREAT SERPL-MCNC: 1.23 MG/DL (ref 0.7–1.3)
CREAT UR-MCNC: 90.5 MG/DL
ERYTHROCYTE [DISTWIDTH] IN BLOOD BY AUTOMATED COUNT: 13.1 % (ref 11.5–14.5)
EST. AVERAGE GLUCOSE BLD GHB EST-MCNC: 183 MG/DL
GLOBULIN SER CALC-MCNC: 3.3 G/DL (ref 2–4)
GLUCOSE SERPL-MCNC: 168 MG/DL (ref 65–100)
HBA1C MFR BLD: 8 % (ref 4–5.6)
HCT VFR BLD AUTO: 47.4 % (ref 36.6–50.3)
HDLC SERPL-MCNC: 48 MG/DL
HDLC SERPL: 3.1 {RATIO} (ref 0–5)
HGB BLD-MCNC: 15 G/DL (ref 12.1–17)
LDLC SERPL CALC-MCNC: 71.4 MG/DL (ref 0–100)
LIPID PROFILE,FLP: ABNORMAL
MCH RBC QN AUTO: 28.8 PG (ref 26–34)
MCHC RBC AUTO-ENTMCNC: 31.6 G/DL (ref 30–36.5)
MCV RBC AUTO: 91 FL (ref 80–99)
MICROALBUMIN UR-MCNC: 0.68 MG/DL
MICROALBUMIN/CREAT UR-RTO: 8 MG/G (ref 0–30)
NRBC # BLD: 0 K/UL (ref 0–0.01)
NRBC BLD-RTO: 0 PER 100 WBC
PLATELET # BLD AUTO: 135 K/UL (ref 150–400)
PMV BLD AUTO: 10.3 FL (ref 8.9–12.9)
POTASSIUM SERPL-SCNC: 4.8 MMOL/L (ref 3.5–5.1)
PROT SERPL-MCNC: 7.4 G/DL (ref 6.4–8.2)
RBC # BLD AUTO: 5.21 M/UL (ref 4.1–5.7)
SODIUM SERPL-SCNC: 134 MMOL/L (ref 136–145)
TRIGL SERPL-MCNC: 153 MG/DL (ref ?–150)
VLDLC SERPL CALC-MCNC: 30.6 MG/DL
WBC # BLD AUTO: 5.9 K/UL (ref 4.1–11.1)

## 2020-12-14 NOTE — PROGRESS NOTES
[address] Medicine Residency Attending Addendum:  Dr. Joseph Rutledge DO,  the patient and I were not physically present during this encounter. The resident and I are concurrently monitoring the patient care through appropriate telecommunication technology. I discussed the findings, assessment and plan with the resident and agree with the resident's findings and plan as documented in the resident's note.       Talia Barry MD

## 2020-12-15 ENCOUNTER — TELEPHONE (OUTPATIENT)
Dept: FAMILY MEDICINE CLINIC | Age: 65
End: 2020-12-15

## 2020-12-15 DIAGNOSIS — E11.9 TYPE 2 DIABETES MELLITUS WITHOUT COMPLICATION, WITHOUT LONG-TERM CURRENT USE OF INSULIN (HCC): ICD-10-CM

## 2020-12-18 ENCOUNTER — TELEPHONE (OUTPATIENT)
Dept: FAMILY MEDICINE CLINIC | Age: 65
End: 2020-12-18

## 2020-12-18 DIAGNOSIS — E11.9 TYPE 2 DIABETES MELLITUS WITHOUT COMPLICATION, WITHOUT LONG-TERM CURRENT USE OF INSULIN (HCC): ICD-10-CM

## 2020-12-18 RX ORDER — GLYBURIDE-METFORMIN HYDROCHLORIDE 5; 500 MG/1; MG/1
TABLET ORAL
Qty: 270 TAB | Refills: 0 | Status: SHIPPED | OUTPATIENT
Start: 2020-12-18 | End: 2021-03-16

## 2020-12-18 NOTE — TELEPHONE ENCOUNTER
A1c increased to 8.0, will increase glucovance from 5/500mg BID to 10/1000 AM and 5/500 PM. Follow up in 3 months. Discussed with patient via phone call.

## 2020-12-18 NOTE — PROGRESS NOTES
CMP, lipid panel, urine microalb ok. CBC plt mildly low at 135, will recheck at next visit. A1c 8.0, increased from 7.4. Will increase glyburide-metformin.    Discussed results with patient via phone call

## 2021-01-07 NOTE — TELEPHONE ENCOUNTER
From: Juan C Carreon  Sent: 9/4/2018 9:34 AM EDT  Subject: Prescription Question    Need Dr. Ladonna Steele to send prescription request 90 day 5/500 mg Glyburide/Metformin to [facility] not [facility]. Left VM for pt to call us to discuss alternative txs. Office hrs provided.

## 2021-03-16 DIAGNOSIS — E11.9 TYPE 2 DIABETES MELLITUS WITHOUT COMPLICATION, WITHOUT LONG-TERM CURRENT USE OF INSULIN (HCC): ICD-10-CM

## 2021-03-16 RX ORDER — GLYBURIDE-METFORMIN HYDROCHLORIDE 5; 500 MG/1; MG/1
TABLET ORAL
Qty: 270 TAB | Refills: 0 | Status: SHIPPED | OUTPATIENT
Start: 2021-03-16 | End: 2021-11-17 | Stop reason: SDUPTHER

## 2021-10-29 ENCOUNTER — OFFICE VISIT (OUTPATIENT)
Dept: FAMILY MEDICINE CLINIC | Age: 66
End: 2021-10-29
Payer: COMMERCIAL

## 2021-10-29 VITALS
BODY MASS INDEX: 34.81 KG/M2 | HEART RATE: 78 BPM | WEIGHT: 257 LBS | HEIGHT: 72 IN | OXYGEN SATURATION: 94 % | DIASTOLIC BLOOD PRESSURE: 72 MMHG | TEMPERATURE: 98 F | SYSTOLIC BLOOD PRESSURE: 148 MMHG

## 2021-10-29 DIAGNOSIS — I10 ESSENTIAL HYPERTENSION: Primary | ICD-10-CM

## 2021-10-29 DIAGNOSIS — E11.9 TYPE 2 DIABETES MELLITUS WITHOUT COMPLICATION, WITHOUT LONG-TERM CURRENT USE OF INSULIN (HCC): ICD-10-CM

## 2021-10-29 DIAGNOSIS — E78.2 MIXED HYPERLIPIDEMIA: ICD-10-CM

## 2021-10-29 PROCEDURE — 99214 OFFICE O/P EST MOD 30 MIN: CPT | Performed by: STUDENT IN AN ORGANIZED HEALTH CARE EDUCATION/TRAINING PROGRAM

## 2021-10-29 PROCEDURE — 3051F HG A1C>EQUAL 7.0%<8.0%: CPT | Performed by: STUDENT IN AN ORGANIZED HEALTH CARE EDUCATION/TRAINING PROGRAM

## 2021-10-29 NOTE — PROGRESS NOTES
Chief Complaint   Patient presents with    Diabetes     follow up      Visit Vitals  BP (!) 148/72 (BP 1 Location: Left upper arm, BP Patient Position: Sitting)   Pulse 78   Temp 98 °F (36.7 °C) (Oral)   Ht 6' (1.829 m)   Wt 257 lb (116.6 kg)   SpO2 94%   BMI 34.86 kg/m²     1. Have you been to the ER, urgent care clinic since your last visit? Hospitalized since your last visit? No    2. Have you seen or consulted any other health care providers outside of the [address] [place] [address] since your last visit? Include any pap smears or colon screening.  No

## 2021-10-29 NOTE — PROGRESS NOTES
CC: chronic conditions follow up  Subjective   Rachel Miles is an 76 y.o. male who presents for chronic conditions follow up. DM2/HLD: Compliant with glucovance, statin, ACEi. Random spot checks sugars, averages around 125. Has been trying to limig carbs, but likes bread/sandwiches. Tried to eat greens as much as possible, but doesn't like many vegetables. Walks 2 miles per day w/ his pets. Also golfs weekly. Saw ophthalmology 02/21. HTN: Home BP's average 130-150/70's. Denies HA, CP, SOB, LE swelling. Review of Systems   Review of Systems   Constitutional: Negative for fever. Respiratory: Negative for shortness of breath. Cardiovascular: Negative for chest pain and leg swelling. Gastrointestinal: Negative for abdominal pain. Genitourinary: Negative for difficulty urinating. Neurological: Negative for dizziness and headaches. Allergies   No Known Allergies    Medications  Current Outpatient Medications   Medication Sig    glyBURIDE-metFORMIN (GLUCOVANCE) 5-500 mg per tablet Take 1 Tablet by mouth two (2) times daily (with meals).  lisinopriL (PRINIVIL, ZESTRIL) 20 mg tablet TAKE 1 TABLET(20 MG) BY MOUTH EVERY DAY    atorvastatin (LIPITOR) 40 mg tablet Take 1 tablet daily by mouth    varicella-zoster recombinant, PF, (SHINGRIX, PF,) 50 mcg/0.5 mL susr injection First dose administer today. 2nd dose to be administered 2-6 mos after initial dose.  cholecalciferol (VITAMIN D3) 1,000 unit cap Take  by mouth two (2) times a day.  BABY ASPIRIN PO Take 81 mg by mouth daily.  ONETOUCH ULTRA TEST strip USE UTD TO CHECK BID    ONETOUCH ULTRA2 monitoring kit USE UTD    ciclopirox (PENLAC) 8 % solution as needed.  co-enzyme Q-10 (CO Q-10) 100 mg capsule TAKE 1 CAPSULE BY MOUTH EVERY DAY BEFORE A MEAL    ONE TOUCH DELICA 33 gauge misc USE FOR TESTING     No current facility-administered medications for this visit.        Medical History  Past Medical History:   Diagnosis Date    Hypercholesterolemia     Hypertension     Nonspecific abnormal electrocardiogram (ECG) (EKG) 3/17/2011    Other dyspnea and respiratory abnormality 3/17/2011    Pre-operative cardiovascular examination 3/17/2011    Type II or unspecified type diabetes mellitus without mention of complication, not stated as uncontrolled 3/17/2011       Immunizations   Immunization History   Administered Date(s) Administered    Influenza Vaccine 10/25/2017    Influenza Vaccine (Quad) PF (>6 Mo Flulaval, Fluarix, and >3 Yrs Afluria, Fluzone 78121) 11/13/2018, 11/13/2019    Pneumococcal Polysaccharide (PPSV-23) 05/10/2018    Tdap 05/10/2018    Zoster Recombinant 11/13/2019       Objective   Vital Signs  Visit Vitals  BP (!) 148/72 (BP 1 Location: Left upper arm, BP Patient Position: Sitting)   Pulse 78   Temp 98 °F (36.7 °C) (Oral)   Ht 6' (1.829 m)   Wt 257 lb (116.6 kg)   SpO2 94%   BMI 34.86 kg/m²       Physical Examination  Physical Exam  Constitutional:       General: He is not in acute distress. Appearance: Normal appearance. He is obese. He is not ill-appearing. Cardiovascular:      Rate and Rhythm: Normal rate and regular rhythm. Pulses: Normal pulses. Pulmonary:      Effort: Pulmonary effort is normal.      Breath sounds: Normal breath sounds. Abdominal:      General: Bowel sounds are normal.      Palpations: Abdomen is soft. Musculoskeletal:         General: Normal range of motion. Cervical back: Normal range of motion. Skin:     General: Skin is warm and dry. Neurological:      Mental Status: He is alert and oriented to person, place, and time. Psychiatric:         Behavior: Behavior normal.          Assessment and Plan   Vandana Covington is a 76 y.o. who presents for chronic conditions follow up. 1. Essential hypertension: Home SBP's 130-150's. BP elevated to 148/72.   - CBC W/O DIFF;  Future  - Continue lisinopril 20 mg daily  - Discussed increasing lisinopril, but patient opts to try lifestyle modifications before increasing  - Recommended keeping BP log and if continues to be elevated, will increase medication  - Follow up in 3 months    2. Type 2 diabetes mellitus without complication, without long-term current use of insulin ([facility]): A1C 8 (12/11/20)  - MICROALBUMIN, UR, RAND W/ MICROALB/CREAT RATIO; Future  - METABOLIC PANEL, BASIC; Future  - LIPID PANEL; Future  - CBC W/O DIFF; Future  - HEMOGLOBIN A1C WITH EAG; Future  - REFERRAL TO PODIATRY  - Continue glucovance, statin, and ACEi  - Follow up in 3 months    3. HLD: LDL 71.4 (12/14/20)  - Continue lifestyle modifications and statin     Follow-up and Dispositions    · Return in about 3 months (around 1/29/2022). I have discussed the aforementioned diagnoses and plan with the patient in detail. I have provided information in person and/or in AVS. All questions answered prior to discharge.       I discussed this patient with Dr. Gabriela Ny (Attending Physician)   Signed By:  Jimbo Guardado DO    Family Medicine Resident

## 2021-11-03 ENCOUNTER — LAB ONLY (OUTPATIENT)
Dept: FAMILY MEDICINE CLINIC | Age: 66
End: 2021-11-03

## 2021-11-03 DIAGNOSIS — E11.9 TYPE 2 DIABETES MELLITUS WITHOUT COMPLICATION, WITHOUT LONG-TERM CURRENT USE OF INSULIN (HCC): ICD-10-CM

## 2021-11-03 DIAGNOSIS — I10 ESSENTIAL HYPERTENSION: ICD-10-CM

## 2021-11-03 LAB
ANION GAP SERPL CALC-SCNC: 5 MMOL/L (ref 5–15)
BUN SERPL-MCNC: 18 MG/DL (ref 6–20)
BUN/CREAT SERPL: 15 (ref 12–20)
CALCIUM SERPL-MCNC: 9.5 MG/DL (ref 8.5–10.1)
CHLORIDE SERPL-SCNC: 104 MMOL/L (ref 97–108)
CHOLEST SERPL-MCNC: 160 MG/DL
CO2 SERPL-SCNC: 29 MMOL/L (ref 21–32)
CREAT SERPL-MCNC: 1.24 MG/DL (ref 0.7–1.3)
CREAT UR-MCNC: 127 MG/DL
ERYTHROCYTE [DISTWIDTH] IN BLOOD BY AUTOMATED COUNT: 13.2 % (ref 11.5–14.5)
EST. AVERAGE GLUCOSE BLD GHB EST-MCNC: 163 MG/DL
GLUCOSE SERPL-MCNC: 166 MG/DL (ref 65–100)
HBA1C MFR BLD: 7.3 % (ref 4–5.6)
HCT VFR BLD AUTO: 47 % (ref 36.6–50.3)
HDLC SERPL-MCNC: 39 MG/DL
HDLC SERPL: 4.1 {RATIO} (ref 0–5)
HGB BLD-MCNC: 15 G/DL (ref 12.1–17)
LDLC SERPL CALC-MCNC: 82.2 MG/DL (ref 0–100)
MCH RBC QN AUTO: 29.2 PG (ref 26–34)
MCHC RBC AUTO-ENTMCNC: 31.9 G/DL (ref 30–36.5)
MCV RBC AUTO: 91.4 FL (ref 80–99)
MICROALBUMIN UR-MCNC: 1.18 MG/DL
MICROALBUMIN/CREAT UR-RTO: 9 MG/G (ref 0–30)
NRBC # BLD: 0 K/UL (ref 0–0.01)
NRBC BLD-RTO: 0 PER 100 WBC
PLATELET # BLD AUTO: 125 K/UL (ref 150–400)
PMV BLD AUTO: 9.6 FL (ref 8.9–12.9)
POTASSIUM SERPL-SCNC: 5.2 MMOL/L (ref 3.5–5.1)
RBC # BLD AUTO: 5.14 M/UL (ref 4.1–5.7)
SODIUM SERPL-SCNC: 138 MMOL/L (ref 136–145)
TRIGL SERPL-MCNC: 194 MG/DL (ref ?–150)
VLDLC SERPL CALC-MCNC: 38.8 MG/DL
WBC # BLD AUTO: 5.8 K/UL (ref 4.1–11.1)

## 2021-11-04 DIAGNOSIS — E78.2 MIXED HYPERLIPIDEMIA: ICD-10-CM

## 2021-11-04 RX ORDER — ATORVASTATIN CALCIUM 40 MG/1
TABLET, FILM COATED ORAL
Qty: 90 TABLET | Refills: 3 | Status: SHIPPED | OUTPATIENT
Start: 2021-11-04 | End: 2022-09-02 | Stop reason: SDUPTHER

## 2021-11-16 DIAGNOSIS — I10 ESSENTIAL HYPERTENSION: ICD-10-CM

## 2021-11-16 DIAGNOSIS — E11.9 TYPE 2 DIABETES MELLITUS WITHOUT COMPLICATION, WITHOUT LONG-TERM CURRENT USE OF INSULIN (HCC): ICD-10-CM

## 2021-11-16 RX ORDER — GLYBURIDE-METFORMIN HYDROCHLORIDE 5; 500 MG/1; MG/1
1 TABLET ORAL 2 TIMES DAILY WITH MEALS
Qty: 60 TABLET | Refills: 0 | Status: CANCELLED | OUTPATIENT
Start: 2021-11-16

## 2021-11-17 RX ORDER — LISINOPRIL 20 MG/1
TABLET ORAL
Qty: 90 TABLET | Refills: 0 | Status: SHIPPED | OUTPATIENT
Start: 2021-11-17 | End: 2022-02-09

## 2021-11-17 RX ORDER — GLYBURIDE-METFORMIN HYDROCHLORIDE 5; 500 MG/1; MG/1
TABLET ORAL
Qty: 270 TABLET | Refills: 0 | Status: SHIPPED | OUTPATIENT
Start: 2021-11-17 | End: 2022-02-09

## 2021-11-23 DIAGNOSIS — D69.6 THROMBOCYTOPENIA (HCC): Primary | ICD-10-CM

## 2021-11-23 NOTE — PROGRESS NOTES
Mild thrombocytopenia. Will repeat CBC and check LFT, peripheral smear, HIV, HCV  BMP unremarkable except mild increase in potassium, consider adding HCTZ to BP regimen if continues to increase.   Microalb/creat normal.  A1c improved to 7.3  TG elevated, LDL 82.2 On statin

## 2022-01-23 ENCOUNTER — PATIENT MESSAGE (OUTPATIENT)
Dept: FAMILY MEDICINE CLINIC | Age: 67
End: 2022-01-23

## 2022-02-09 DIAGNOSIS — I10 ESSENTIAL HYPERTENSION: ICD-10-CM

## 2022-02-09 DIAGNOSIS — E11.9 TYPE 2 DIABETES MELLITUS WITHOUT COMPLICATION, WITHOUT LONG-TERM CURRENT USE OF INSULIN (HCC): ICD-10-CM

## 2022-02-09 RX ORDER — LISINOPRIL 20 MG/1
TABLET ORAL
Qty: 90 TABLET | Refills: 0 | Status: SHIPPED | OUTPATIENT
Start: 2022-02-09 | End: 2022-05-08 | Stop reason: SDUPTHER

## 2022-02-09 RX ORDER — GLYBURIDE-METFORMIN HYDROCHLORIDE 5; 500 MG/1; MG/1
TABLET ORAL
Qty: 270 TABLET | Refills: 0 | Status: SHIPPED | OUTPATIENT
Start: 2022-02-09 | End: 2022-05-10

## 2022-03-19 PROBLEM — I10 ESSENTIAL HYPERTENSION: Status: ACTIVE | Noted: 2018-01-29

## 2022-05-08 DIAGNOSIS — I10 ESSENTIAL HYPERTENSION: ICD-10-CM

## 2022-05-08 RX ORDER — LISINOPRIL 20 MG/1
TABLET ORAL
Qty: 90 TABLET | Refills: 0 | Status: SHIPPED | OUTPATIENT
Start: 2022-05-08 | End: 2022-08-07

## 2022-05-09 DIAGNOSIS — E11.9 TYPE 2 DIABETES MELLITUS WITHOUT COMPLICATION, WITHOUT LONG-TERM CURRENT USE OF INSULIN (HCC): ICD-10-CM

## 2022-05-10 RX ORDER — GLYBURIDE-METFORMIN HYDROCHLORIDE 5; 500 MG/1; MG/1
TABLET ORAL
Qty: 270 TABLET | Refills: 0 | Status: SHIPPED | OUTPATIENT
Start: 2022-05-10 | End: 2022-08-09

## 2022-09-02 ENCOUNTER — OFFICE VISIT (OUTPATIENT)
Dept: FAMILY MEDICINE CLINIC | Age: 67
End: 2022-09-02
Payer: MEDICARE

## 2022-09-02 VITALS
RESPIRATION RATE: 14 BRPM | DIASTOLIC BLOOD PRESSURE: 78 MMHG | OXYGEN SATURATION: 97 % | TEMPERATURE: 97.8 F | BODY MASS INDEX: 34.81 KG/M2 | SYSTOLIC BLOOD PRESSURE: 150 MMHG | WEIGHT: 257 LBS | HEIGHT: 72 IN | HEART RATE: 69 BPM

## 2022-09-02 DIAGNOSIS — E78.2 MIXED HYPERLIPIDEMIA: ICD-10-CM

## 2022-09-02 DIAGNOSIS — E11.9 TYPE 2 DIABETES MELLITUS WITHOUT COMPLICATION, WITHOUT LONG-TERM CURRENT USE OF INSULIN (HCC): Primary | ICD-10-CM

## 2022-09-02 DIAGNOSIS — D69.6 THROMBOCYTOPENIA (HCC): ICD-10-CM

## 2022-09-02 DIAGNOSIS — I10 ESSENTIAL HYPERTENSION: ICD-10-CM

## 2022-09-02 PROCEDURE — 1123F ACP DISCUSS/DSCN MKR DOCD: CPT | Performed by: STUDENT IN AN ORGANIZED HEALTH CARE EDUCATION/TRAINING PROGRAM

## 2022-09-02 PROCEDURE — G8417 CALC BMI ABV UP PARAM F/U: HCPCS | Performed by: STUDENT IN AN ORGANIZED HEALTH CARE EDUCATION/TRAINING PROGRAM

## 2022-09-02 PROCEDURE — 1101F PT FALLS ASSESS-DOCD LE1/YR: CPT | Performed by: STUDENT IN AN ORGANIZED HEALTH CARE EDUCATION/TRAINING PROGRAM

## 2022-09-02 PROCEDURE — G8754 DIAS BP LESS 90: HCPCS | Performed by: STUDENT IN AN ORGANIZED HEALTH CARE EDUCATION/TRAINING PROGRAM

## 2022-09-02 PROCEDURE — G8753 SYS BP > OR = 140: HCPCS | Performed by: STUDENT IN AN ORGANIZED HEALTH CARE EDUCATION/TRAINING PROGRAM

## 2022-09-02 PROCEDURE — G8432 DEP SCR NOT DOC, RNG: HCPCS | Performed by: STUDENT IN AN ORGANIZED HEALTH CARE EDUCATION/TRAINING PROGRAM

## 2022-09-02 PROCEDURE — G0463 HOSPITAL OUTPT CLINIC VISIT: HCPCS | Performed by: STUDENT IN AN ORGANIZED HEALTH CARE EDUCATION/TRAINING PROGRAM

## 2022-09-02 PROCEDURE — G8427 DOCREV CUR MEDS BY ELIG CLIN: HCPCS | Performed by: STUDENT IN AN ORGANIZED HEALTH CARE EDUCATION/TRAINING PROGRAM

## 2022-09-02 PROCEDURE — 3017F COLORECTAL CA SCREEN DOC REV: CPT | Performed by: STUDENT IN AN ORGANIZED HEALTH CARE EDUCATION/TRAINING PROGRAM

## 2022-09-02 PROCEDURE — 99214 OFFICE O/P EST MOD 30 MIN: CPT | Performed by: STUDENT IN AN ORGANIZED HEALTH CARE EDUCATION/TRAINING PROGRAM

## 2022-09-02 PROCEDURE — 2022F DILAT RTA XM EVC RTNOPTHY: CPT | Performed by: STUDENT IN AN ORGANIZED HEALTH CARE EDUCATION/TRAINING PROGRAM

## 2022-09-02 PROCEDURE — 3046F HEMOGLOBIN A1C LEVEL >9.0%: CPT | Performed by: STUDENT IN AN ORGANIZED HEALTH CARE EDUCATION/TRAINING PROGRAM

## 2022-09-02 PROCEDURE — G8536 NO DOC ELDER MAL SCRN: HCPCS | Performed by: STUDENT IN AN ORGANIZED HEALTH CARE EDUCATION/TRAINING PROGRAM

## 2022-09-02 RX ORDER — HYDROCHLOROTHIAZIDE 12.5 MG/1
12.5 TABLET ORAL DAILY
Qty: 30 TABLET | Refills: 0 | Status: SHIPPED | OUTPATIENT
Start: 2022-09-02 | End: 2022-09-19 | Stop reason: SINTOL

## 2022-09-02 RX ORDER — GLYBURIDE-METFORMIN HYDROCHLORIDE 5; 500 MG/1; MG/1
TABLET ORAL
Qty: 90 TABLET | Refills: 3 | Status: SHIPPED | OUTPATIENT
Start: 2022-09-02 | End: 2022-09-19

## 2022-09-02 RX ORDER — LISINOPRIL 20 MG/1
20 TABLET ORAL DAILY
Qty: 90 TABLET | Refills: 1 | Status: SHIPPED | OUTPATIENT
Start: 2022-09-02

## 2022-09-02 RX ORDER — ATORVASTATIN CALCIUM 40 MG/1
TABLET, FILM COATED ORAL
Qty: 90 TABLET | Refills: 3 | Status: SHIPPED | OUTPATIENT
Start: 2022-09-02

## 2022-09-02 NOTE — PROGRESS NOTES
Marcin Omalley is a 78 y.o. male    No chief complaint on file. 1. Have you been to the ER, urgent care clinic since your last visit? Hospitalized since your last visit? No  2. Have you seen or consulted any other health care providers outside of the [address] [place] [address] since your last visit? Include any pap smears or colon screening. No    There were no vitals taken for this visit.   3 most recent PHQ Screens 9/2/2022   Little interest or pleasure in doing things Not at all   Feeling down, depressed, irritable, or hopeless Not at all   Total Score PHQ 2 0     Health Maintenance Due   Topic Date Due    Depression Screen  Never done    COVID-19 Vaccine (1) Never done    Eye Exam Retinal or Dilated  Never done    Colorectal Cancer Screening Combo  Never done    Pneumococcal 65+ years (2 - PCV) 05/10/2019    Shingrix Vaccine Age 50> (2 of 2) 01/08/2020    Foot Exam Q1  11/13/2020    Flu Vaccine (1) 09/01/2022

## 2022-09-02 NOTE — PROGRESS NOTES
[address] [facility] [address] [address]   Office [phone], Fax [phone]      Chief Complaint:     Chief Complaint   Patient presents with    Hypertension     Patient is here for hypertension. Daniel Fulton is a 78 y.o. male that presents for: Follow up on BP      Subjective:   HPI:  Daniel Fulton is a 78 y.o. male that presents for:    HTN: hasn't taken BP at home in a while. Used to run in 140s. Denies CP, vision changes, HA. DM: Working on diet and exercise. Eggs and sausage every morning. Drinks water and Gatorade 0. Dinner is salad, chicken. Eats a lot of sandwiches at lunch. Doesn't eat sweets, doesn't eat fruit. Golfs once a week. Does walk dog 1 mile 2x a day. ROS:   Review of Systems   Eyes:         Cataracts (b/l)   Respiratory:  Negative for shortness of breath. Cardiovascular:  Negative for chest pain. Neurological:  Negative for headaches. Past medical history, social history, medications, and allergies personally reviewed. Past Medical History:   Diagnosis Date    Hypercholesterolemia     Hypertension     Nonspecific abnormal electrocardiogram (ECG) (EKG) 3/17/2011    Other dyspnea and respiratory abnormality 3/17/2011    Pre-operative cardiovascular examination 3/17/2011    Type II or unspecified type diabetes mellitus without mention of complication, not stated as uncontrolled 3/17/2011        Social Hx:   Alcohol: rarely, glass of wine once a month  Tobacco: former, smoked until 1995, 10 years a pack a day  Illicit drug use: none    Medications:   Current Outpatient Medications   Medication Sig    hydroCHLOROthiazide (HYDRODIURIL) 12.5 mg tablet Take 1 Tablet by mouth daily. glyBURIDE-metFORMIN (GLUCOVANCE) 5-500 mg per tablet Take 2 Tablets by mouth daily (with breakfast) AND 1 Tablet daily (with dinner). lisinopriL (PRINIVIL, ZESTRIL) 20 mg tablet Take 1 Tablet by mouth daily.     atorvastatin (LIPITOR) 40 mg tablet TAKE 1 TABLET BY MOUTH EVERY DAY cholecalciferol (VITAMIN D3) 25 mcg (1,000 unit) cap Take  by mouth two (2) times a day. BABY ASPIRIN PO Take 81 mg by mouth daily. ONETOUCH ULTRA TEST strip USE UTD TO CHECK BID    ONETOUCH ULTRA2 monitoring kit USE UTD    ONE TOUCH DELICA 33 gauge misc USE FOR TESTING    varicella-zoster recombinant, PF, (SHINGRIX, PF,) 50 mcg/0.5 mL susr injection First dose administer today. 2nd dose to be administered 2-6 mos after initial dose. (Patient not taking: Reported on 9/2/2022)    ciclopirox (PENLAC) 8 % solution as needed. co-enzyme Q-10 (CO Q-10) 100 mg capsule TAKE 1 CAPSULE BY MOUTH EVERY DAY BEFORE A MEAL (Patient not taking: Reported on 9/2/2022)     No current facility-administered medications for this visit. Allergies:  No Known Allergies     Health Maintenance:  Health Maintenance Due   Topic Date Due    COVID-19 Vaccine (1) Never done    Eye Exam Retinal or Dilated  Never done    Colorectal Cancer Screening Combo  Never done    Pneumococcal 65+ years (2 - PCV) 05/10/2019    Shingrix Vaccine Age 50> (2 of 2) 01/08/2020    Flu Vaccine (1) 09/01/2022    Medicare Yearly Exam  09/02/2022   Due for second covid booster  Follows with eye doctor  Pneumonia - unsure if he has had one before  Shingles - done    Objective:   Vitals reviewed. Visit Vitals  BP (!) 150/78 (BP 1 Location: Right arm, BP Patient Position: Sitting, BP Cuff Size: Large adult)   Pulse 69   Temp 97.8 °F (36.6 °C)   Resp 14   Ht 6' (1.829 m)   Wt 257 lb (116.6 kg)   SpO2 97%   BMI 34.86 kg/m²        Physical Exam:  General Alert. No distress. Not diaphoretic. No jaundice, cyanosis, pallor. HENT Head Normocephalic and atraumatic. Eyes Conjunctivae pink, no discharge. No scleral icterus. EOMI. Cardio Normal rate, regular rhythm. Mild flow murmur noted. No gallop, or friction rub. No chest wall tenderness. Pulmonary Effort normal. Breath sounds normal. No respiratory distress. No wheezes, rales, or rhonchi. No Stridor. Abdominal Soft. Bowel sounds normal. No distension. No tenderness.  Deferred. Extremities No edema of lower extremities. No tenderness. Neurological No focal deficits. Skin Skin is warm and dry. No rash noted. No erythema. Psychiatric Mood, affect, and judgment normal.      Diabetic foot exam:  Sensation: normal to monofilament testing. No ulcerations/signs of infection. Calluses or corns: no  Pulses: intact    Assessment/Plan:     1. Type 2 diabetes mellitus without complication, without long-term current use of insulin ([facility]  Discussed importance of diet and exercise. Will repeat A1C, last one 7.3, close to goal but not controlled. Refills needed. F/w eye doctor for eye exam, foot exam normal today. -      DIABETES FOOT EXAM  -     HEMOGLOBIN A1C WITH EAG; Future  -     glyBURIDE-metFORMIN (GLUCOVANCE) 5-500 mg per tablet; Take 2 Tablets by mouth daily (with breakfast) AND 1 Tablet daily (with dinner). , Normal, Disp-90 Tablet, R-3    2. Essential hypertension  Uncontrolled. Take BP at home for 2 weeks. Added HCTZ given uncontrolled BP and slight elevated in potassium at last visit. Will get CMP that was ordered for follow up too. -     hydroCHLOROthiazide (HYDRODIURIL) 12.5 mg tablet; Take 1 Tablet by mouth daily. , Normal, Disp-30 Tablet, R-0  -     lisinopriL (PRINIVIL, ZESTRIL) 20 mg tablet; Take 1 Tablet by mouth daily. , Normal, Disp-90 Tablet, R-1    3. Mixed hyperlipidemia  Last LDL 82. Close to goal, will repeat. -     atorvastatin (LIPITOR) 40 mg tablet; TAKE 1 TABLET BY MOUTH EVERY DAY, Normal, Disp-90 Tablet, R-3    4. Thrombocytopenia ([facility]   Noted on last CBC. Follow up labs were ordered but patient did not get them. Will have them repeat CBC and get remainder of labs. Follow up:   Return in about 2 weeks (around 9/16/2022) for BP follow up. Pt was discussed with Dr. Chauncey Elliott (attending physician). I have reviewed pertinent labs results and other data.  I have discussed the diagnosis with the patient and the intended plan as seen in the above orders. The patient has received an after-visit summary and questions were answered concerning future plans. I have discussed medication side effects and warnings with the patient as well.     Helen Fernandez MD  Resident [facility]  09/02/22

## 2022-09-05 NOTE — PROGRESS NOTES
[address] Medicine Residency Attending Addendum:  I saw and evaluated the patient on the day of the encounter with Penny Tavera MD  , performing the key elements of the service. I discussed the findings, assessment and plan with the resident and agree with the resident's findings and plan as documented in the resident's note.       Salma Winters MD, CAQSM, RMSK

## 2022-09-06 ENCOUNTER — LAB ONLY (OUTPATIENT)
Dept: FAMILY MEDICINE CLINIC | Age: 67
End: 2022-09-06

## 2022-09-06 DIAGNOSIS — R79.9 ABNORMAL BLOOD CHEMISTRY TEST: ICD-10-CM

## 2022-09-06 DIAGNOSIS — Z79.899 ENCOUNTER FOR LONG-TERM (CURRENT) DRUG USE: Primary | ICD-10-CM

## 2022-09-06 DIAGNOSIS — E11.9 TYPE 2 DIABETES MELLITUS WITHOUT COMPLICATION, WITHOUT LONG-TERM CURRENT USE OF INSULIN (HCC): ICD-10-CM

## 2022-09-06 DIAGNOSIS — R74.8 ABNORMAL AST AND ALT: ICD-10-CM

## 2022-09-06 LAB
EST. AVERAGE GLUCOSE BLD GHB EST-MCNC: 189 MG/DL
HBA1C MFR BLD: 8.2 % (ref 4–5.6)

## 2022-09-07 LAB
ALBUMIN SERPL-MCNC: 4.2 G/DL (ref 3.5–5)
ALBUMIN/GLOB SERPL: 1.4 {RATIO} (ref 1.1–2.2)
ALP SERPL-CCNC: 72 U/L (ref 45–117)
ALT SERPL-CCNC: 69 U/L (ref 12–78)
ANION GAP SERPL CALC-SCNC: 9 MMOL/L (ref 5–15)
AST SERPL-CCNC: 48 U/L (ref 15–37)
BASOPHILS # BLD: 0.1 K/UL (ref 0–0.1)
BASOPHILS NFR BLD: 1 % (ref 0–1)
BILIRUB SERPL-MCNC: 1 MG/DL (ref 0.2–1)
BUN SERPL-MCNC: 29 MG/DL (ref 6–20)
BUN/CREAT SERPL: 19 (ref 12–20)
CALCIUM SERPL-MCNC: 9.7 MG/DL (ref 8.5–10.1)
CHLORIDE SERPL-SCNC: 105 MMOL/L (ref 97–108)
CO2 SERPL-SCNC: 24 MMOL/L (ref 21–32)
CREAT SERPL-MCNC: 1.54 MG/DL (ref 0.7–1.3)
DIFFERENTIAL METHOD BLD: ABNORMAL
EOSINOPHIL # BLD: 0.2 K/UL (ref 0–0.4)
EOSINOPHIL NFR BLD: 3 % (ref 0–7)
ERYTHROCYTE [DISTWIDTH] IN BLOOD BY AUTOMATED COUNT: 13.4 % (ref 11.5–14.5)
GLOBULIN SER CALC-MCNC: 3 G/DL (ref 2–4)
GLUCOSE SERPL-MCNC: 167 MG/DL (ref 65–100)
HAV IGM SER QL: NONREACTIVE
HBV CORE IGM SER QL: NONREACTIVE
HBV SURFACE AG SER QL: 0.12 INDEX
HBV SURFACE AG SER QL: NEGATIVE
HCT VFR BLD AUTO: 46.8 % (ref 36.6–50.3)
HCV AB SERPL QL IA: NONREACTIVE
HGB BLD-MCNC: 15.1 G/DL (ref 12.1–17)
HIV 1+2 AB+HIV1 P24 AG SERPL QL IA: NONREACTIVE
HIV12 RESULT COMMENT, HHIVC: NORMAL
IMM GRANULOCYTES # BLD AUTO: 0.1 K/UL (ref 0–0.04)
IMM GRANULOCYTES NFR BLD AUTO: 1 % (ref 0–0.5)
LYMPHOCYTES # BLD: 1.7 K/UL (ref 0.8–3.5)
LYMPHOCYTES NFR BLD: 25 % (ref 12–49)
MCH RBC QN AUTO: 29.5 PG (ref 26–34)
MCHC RBC AUTO-ENTMCNC: 32.3 G/DL (ref 30–36.5)
MCV RBC AUTO: 91.4 FL (ref 80–99)
MONOCYTES # BLD: 0.7 K/UL (ref 0–1)
MONOCYTES NFR BLD: 10 % (ref 5–13)
NEUTS SEG # BLD: 4.2 K/UL (ref 1.8–8)
NEUTS SEG NFR BLD: 60 % (ref 32–75)
NRBC # BLD: 0 K/UL (ref 0–0.01)
NRBC BLD-RTO: 0 PER 100 WBC
PERIPHERAL SMEAR,PSM: NORMAL
PLATELET # BLD AUTO: 140 K/UL (ref 150–400)
PMV BLD AUTO: 9.8 FL (ref 8.9–12.9)
POTASSIUM SERPL-SCNC: 4.8 MMOL/L (ref 3.5–5.1)
PROT SERPL-MCNC: 7.2 G/DL (ref 6.4–8.2)
RBC # BLD AUTO: 5.12 M/UL (ref 4.1–5.7)
SODIUM SERPL-SCNC: 138 MMOL/L (ref 136–145)
SP1: NORMAL
SP2: NORMAL
SP3: NORMAL
WBC # BLD AUTO: 7 K/UL (ref 4.1–11.1)

## 2022-09-19 ENCOUNTER — TELEPHONE (OUTPATIENT)
Dept: FAMILY MEDICINE CLINIC | Age: 67
End: 2022-09-19

## 2022-09-19 DIAGNOSIS — D69.6 THROMBOCYTOPENIA (HCC): ICD-10-CM

## 2022-09-19 DIAGNOSIS — E11.9 TYPE 2 DIABETES MELLITUS WITHOUT COMPLICATION, WITHOUT LONG-TERM CURRENT USE OF INSULIN (HCC): Primary | ICD-10-CM

## 2022-09-19 RX ORDER — METFORMIN HYDROCHLORIDE 500 MG/1
TABLET ORAL
Qty: 90 TABLET | Refills: 2 | Status: SHIPPED | OUTPATIENT
Start: 2022-09-19 | End: 2022-10-31

## 2022-09-19 NOTE — TELEPHONE ENCOUNTER
Called patient to discuss results, verified . Some light headedness daily since starting HCTZ - BP still averaging around 140s/150s despite new medication. No episodes of LOC. Worse on walks with dog. Discussed stopping HCTZ to see if this resulted in improvement in symptoms. Will likely start on Norvasc on follow-up appointment on Monday. Patient to continue taking blood pressure daily and monitoring symptoms. Persistent thrombocytopenia. Peripheral smear with normal platelets. Will send in referral to heme. Patient with persistent elevation in A1c. Discussed stopping glyburide/metformin combination pill and switching to just metformin and SGLT2 given CKD (SGLT2 with renal protection benefits). Patient educated on risks of euglycemic DKA and will continue to monitor blood glucose levels and symptoms closely when starting new medication. Discontinue glyburide due to risk of hypoglycemia when using in combination with SGLT2. Patient with CKD. Reports not drinking a lot of water day of labs. We will repeat at follow-up visit as this most recent creatinine/GFR value was slightly worse from baseline compared to previous years. Hepatitis panel and HIV testing negative. Persistent slight elevation in ALT, likely related to suspected fatty liver given history of HLD. Will discuss RUQ ultrasound at follow-up visit.       Mayela Saba DO  PGY-2 Resident  [address] [facility]

## 2022-09-20 ENCOUNTER — TELEPHONE (OUTPATIENT)
Dept: ONCOLOGY | Age: 67
End: 2022-09-20

## 2022-09-20 NOTE — TELEPHONE ENCOUNTER
Called patient to set up NP appt. No answer. Left vm.     Np / Thrombocytopenia ([facility] [address] / Dr. Jer Gloria

## 2022-09-26 ENCOUNTER — OFFICE VISIT (OUTPATIENT)
Dept: FAMILY MEDICINE CLINIC | Age: 67
End: 2022-09-26
Payer: MEDICARE

## 2022-09-26 VITALS — HEART RATE: 77 BPM | DIASTOLIC BLOOD PRESSURE: 78 MMHG | SYSTOLIC BLOOD PRESSURE: 153 MMHG | OXYGEN SATURATION: 95 %

## 2022-09-26 DIAGNOSIS — R94.4 DECREASED GFR: ICD-10-CM

## 2022-09-26 DIAGNOSIS — I10 ESSENTIAL HYPERTENSION: Primary | ICD-10-CM

## 2022-09-26 DIAGNOSIS — E11.22 TYPE 2 DIABETES MELLITUS WITH CHRONIC KIDNEY DISEASE, WITHOUT LONG-TERM CURRENT USE OF INSULIN, UNSPECIFIED CKD STAGE (HCC): ICD-10-CM

## 2022-09-26 PROCEDURE — G8754 DIAS BP LESS 90: HCPCS | Performed by: STUDENT IN AN ORGANIZED HEALTH CARE EDUCATION/TRAINING PROGRAM

## 2022-09-26 PROCEDURE — 2022F DILAT RTA XM EVC RTNOPTHY: CPT | Performed by: STUDENT IN AN ORGANIZED HEALTH CARE EDUCATION/TRAINING PROGRAM

## 2022-09-26 PROCEDURE — G8536 NO DOC ELDER MAL SCRN: HCPCS | Performed by: STUDENT IN AN ORGANIZED HEALTH CARE EDUCATION/TRAINING PROGRAM

## 2022-09-26 PROCEDURE — 99214 OFFICE O/P EST MOD 30 MIN: CPT | Performed by: STUDENT IN AN ORGANIZED HEALTH CARE EDUCATION/TRAINING PROGRAM

## 2022-09-26 PROCEDURE — G8428 CUR MEDS NOT DOCUMENT: HCPCS | Performed by: STUDENT IN AN ORGANIZED HEALTH CARE EDUCATION/TRAINING PROGRAM

## 2022-09-26 PROCEDURE — 1101F PT FALLS ASSESS-DOCD LE1/YR: CPT | Performed by: STUDENT IN AN ORGANIZED HEALTH CARE EDUCATION/TRAINING PROGRAM

## 2022-09-26 PROCEDURE — 3052F HG A1C>EQUAL 8.0%<EQUAL 9.0%: CPT | Performed by: STUDENT IN AN ORGANIZED HEALTH CARE EDUCATION/TRAINING PROGRAM

## 2022-09-26 PROCEDURE — G0463 HOSPITAL OUTPT CLINIC VISIT: HCPCS | Performed by: STUDENT IN AN ORGANIZED HEALTH CARE EDUCATION/TRAINING PROGRAM

## 2022-09-26 PROCEDURE — 1123F ACP DISCUSS/DSCN MKR DOCD: CPT | Performed by: STUDENT IN AN ORGANIZED HEALTH CARE EDUCATION/TRAINING PROGRAM

## 2022-09-26 PROCEDURE — G8417 CALC BMI ABV UP PARAM F/U: HCPCS | Performed by: STUDENT IN AN ORGANIZED HEALTH CARE EDUCATION/TRAINING PROGRAM

## 2022-09-26 PROCEDURE — 3017F COLORECTAL CA SCREEN DOC REV: CPT | Performed by: STUDENT IN AN ORGANIZED HEALTH CARE EDUCATION/TRAINING PROGRAM

## 2022-09-26 PROCEDURE — G8432 DEP SCR NOT DOC, RNG: HCPCS | Performed by: STUDENT IN AN ORGANIZED HEALTH CARE EDUCATION/TRAINING PROGRAM

## 2022-09-26 PROCEDURE — G8753 SYS BP > OR = 140: HCPCS | Performed by: STUDENT IN AN ORGANIZED HEALTH CARE EDUCATION/TRAINING PROGRAM

## 2022-09-26 NOTE — PROGRESS NOTES
[address] [facility] [address] [place] [address]   Office [phone], Fax [phone]      Chief Complaint:   No chief complaint on file. Sonia Burr is a 78 y.o. male that presents for: HTN      Subjective:   HPI:  Sonia Burr is a 78 y.o. male that presents for:    HTN follow up:   - taken off HCTZ as it made him dizzy   - dizziness resolved   - BP were staying same while on it (in 140s/150s  - Denies CP, SOB, HA    DM:   - trying to be better about diet now   - Has not been working out like he used to  - A1C 8.2  on 9/2  - Wanted pt to stop glyburide and add SGLT2 but insurance only paid for a portion - still $600 for him to pick this up    Heme appt November 3rd for chronic thrombocytopenia. ROS:   Review of Systems   Eyes:         Glasses over the last few years. Cataracts. Respiratory:  Negative for shortness of breath. Cardiovascular:  Negative for chest pain. Neurological:  Negative for dizziness. Past medical history, social history, medications, and allergies personally reviewed. Past Medical History:   Diagnosis Date    Hypercholesterolemia     Hypertension     Nonspecific abnormal electrocardiogram (ECG) (EKG) 3/17/2011    Other dyspnea and respiratory abnormality 3/17/2011    Pre-operative cardiovascular examination 3/17/2011    Type II or unspecified type diabetes mellitus without mention of complication, not stated as uncontrolled 3/17/2011        Medications:   Current Outpatient Medications   Medication Sig    amLODIPine (NORVASC) 5 mg tablet Take 1 Tablet by mouth daily. metFORMIN (GLUCOPHAGE) 500 mg tablet Take two tablets daily with breakfast and one tablet daily with dinner. empagliflozin (JARDIANCE) 10 mg tablet Take 1 Tablet by mouth daily. lisinopriL (PRINIVIL, ZESTRIL) 20 mg tablet Take 1 Tablet by mouth daily.     atorvastatin (LIPITOR) 40 mg tablet TAKE 1 TABLET BY MOUTH EVERY DAY    varicella-zoster recombinant, PF, (SHINGRIX, PF,) 50 mcg/0.5 mL susr injection First dose administer today. 2nd dose to be administered 2-6 mos after initial dose. (Patient not taking: Reported on 9/2/2022)    cholecalciferol (VITAMIN D3) 25 mcg (1,000 unit) cap Take  by mouth two (2) times a day. BABY ASPIRIN PO Take 81 mg by mouth daily. ONETOUCH ULTRA TEST strip USE UTD TO CHECK BID    ONETOUCH ULTRA2 monitoring kit USE UTD    ciclopirox (PENLAC) 8 % solution as needed. co-enzyme Q-10 (CO Q-10) 100 mg capsule TAKE 1 CAPSULE BY MOUTH EVERY DAY BEFORE A MEAL (Patient not taking: Reported on 9/2/2022)    ONE TOUCH DELICA 33 gauge misc USE FOR TESTING     No current facility-administered medications for this visit. Allergies:  No Known Allergies     Health Maintenance:  Health Maintenance Due   Topic Date Due    COVID-19 Vaccine (1) Never done    Eye Exam Retinal or Dilated  Never done    Colorectal Cancer Screening Combo  Never done    Shingrix Vaccine Age 50> (2 of 2) 01/08/2020    Flu Vaccine (1) 08/01/2022    Medicare Yearly Exam  09/02/2022        Objective:   Vitals reviewed. Visit Vitals  BP (!) 153/78 (BP 1 Location: Right upper arm)   Pulse 77   SpO2 95%        Physical Exam:  General Alert. No distress. Not diaphoretic. No jaundice, cyanosis, pallor. HENT Head Normocephalic and atraumatic. Eyes Conjunctivae pink, no discharge. No scleral icterus. EOMI. Cardio Normal rate, regular rhythm. No murmur, gallop, or friction rub. No chest wall tenderness. Pulmonary Effort normal. Breath sounds normal. No respiratory distress. No wheezes, rales, or rhonchi. No Stridor. Extremities No edema of lower extremities. Neurological No focal deficits. Skin Skin is warm and dry. No rash noted. No erythema. Psychiatric Mood, affect, and judgment normal.        Assessment/Plan:     1. Essential hypertension  Uncontrolled. Start Norvasc. Med SE discussed. Will return in 2 weeks with BP log.   -     amLODIPine (NORVASC) 5 mg tablet;  Take 1 Tablet by mouth daily., Normal, Disp-90 Tablet, R-0    2. Decreased GFR  Dropped even lower at last check. Will reassess as patient may have been dehydrated. -     METABOLIC PANEL, BASIC; Future    3. Type 2 diabetes mellitus with chronic kidney disease, without long-term current use of insulin, unspecified CKD stage ([facility] [address]   Would like patient to start on SGLT2i for renal protection. Will contact pharmacy/insurance to get more information on coverage options. Follow up:   Return in about 2 weeks (around 10/10/2022) for HTN and DM. Pt was discussed with Dr. Silke Kennedy (attending physician). I have reviewed pertinent labs results and other data. I have discussed the diagnosis with the patient and the intended plan as seen in the above orders. The patient has received an after-visit summary and questions were answered concerning future plans. I have discussed medication side effects and warnings with the patient as well.     Danni Millan MD  Resident [facility]  09/28/22

## 2022-09-27 LAB
ANION GAP SERPL CALC-SCNC: 6 MMOL/L (ref 5–15)
BUN SERPL-MCNC: 24 MG/DL (ref 6–20)
BUN/CREAT SERPL: 18 (ref 12–20)
CALCIUM SERPL-MCNC: 8.9 MG/DL (ref 8.5–10.1)
CHLORIDE SERPL-SCNC: 106 MMOL/L (ref 97–108)
CO2 SERPL-SCNC: 24 MMOL/L (ref 21–32)
CREAT SERPL-MCNC: 1.3 MG/DL (ref 0.7–1.3)
GLUCOSE SERPL-MCNC: 142 MG/DL (ref 65–100)
POTASSIUM SERPL-SCNC: 4.7 MMOL/L (ref 3.5–5.1)
SODIUM SERPL-SCNC: 136 MMOL/L (ref 136–145)

## 2022-09-28 RX ORDER — AMLODIPINE BESYLATE 5 MG/1
5 TABLET ORAL DAILY
Qty: 90 TABLET | Refills: 0 | Status: SHIPPED | OUTPATIENT
Start: 2022-09-28

## 2022-09-28 NOTE — PROGRESS NOTES
GFR improved. Still would like to get patient on SGLT2i for renal protection. Called patient he was able to give me number on his insurance card to call. Will follow up.

## 2022-10-25 ENCOUNTER — TELEPHONE (OUTPATIENT)
Dept: FAMILY MEDICINE CLINIC | Age: 67
End: 2022-10-25

## 2022-10-25 NOTE — TELEPHONE ENCOUNTER
Number provided by patient to get in contact with Medicare Part D accessible for only pharmacists, unable to get ahold of a person to speak with after a few attempts. Called pharmacy back again and spoke with different pharmacist. $500+ for one month supply and that is AFTER insurance coverage. States he likely is in \"donut hole\" right now with coverage. Does not qualify for coupons. Message sent to patient, will discuss further at follow up appt on Friday.      Suzanne Gambino,   PGY-2 Resident  [address] Medicine

## 2022-10-28 ENCOUNTER — OFFICE VISIT (OUTPATIENT)
Dept: FAMILY MEDICINE CLINIC | Age: 67
End: 2022-10-28
Payer: MEDICARE

## 2022-10-28 VITALS
BODY MASS INDEX: 34.75 KG/M2 | SYSTOLIC BLOOD PRESSURE: 129 MMHG | DIASTOLIC BLOOD PRESSURE: 74 MMHG | OXYGEN SATURATION: 97 % | HEART RATE: 73 BPM | TEMPERATURE: 97 F | WEIGHT: 256.6 LBS | RESPIRATION RATE: 15 BRPM | HEIGHT: 72 IN

## 2022-10-28 DIAGNOSIS — H61.21 IMPACTED CERUMEN OF RIGHT EAR: ICD-10-CM

## 2022-10-28 DIAGNOSIS — I10 ESSENTIAL HYPERTENSION: ICD-10-CM

## 2022-10-28 DIAGNOSIS — E11.22 TYPE 2 DIABETES MELLITUS WITH CHRONIC KIDNEY DISEASE, WITHOUT LONG-TERM CURRENT USE OF INSULIN, UNSPECIFIED CKD STAGE (HCC): Primary | ICD-10-CM

## 2022-10-28 PROCEDURE — G8754 DIAS BP LESS 90: HCPCS | Performed by: STUDENT IN AN ORGANIZED HEALTH CARE EDUCATION/TRAINING PROGRAM

## 2022-10-28 PROCEDURE — 69209 REMOVE IMPACTED EAR WAX UNI: CPT | Performed by: STUDENT IN AN ORGANIZED HEALTH CARE EDUCATION/TRAINING PROGRAM

## 2022-10-28 PROCEDURE — 3074F SYST BP LT 130 MM HG: CPT | Performed by: STUDENT IN AN ORGANIZED HEALTH CARE EDUCATION/TRAINING PROGRAM

## 2022-10-28 PROCEDURE — G0463 HOSPITAL OUTPT CLINIC VISIT: HCPCS | Performed by: STUDENT IN AN ORGANIZED HEALTH CARE EDUCATION/TRAINING PROGRAM

## 2022-10-28 PROCEDURE — G8417 CALC BMI ABV UP PARAM F/U: HCPCS | Performed by: STUDENT IN AN ORGANIZED HEALTH CARE EDUCATION/TRAINING PROGRAM

## 2022-10-28 PROCEDURE — 99214 OFFICE O/P EST MOD 30 MIN: CPT | Performed by: STUDENT IN AN ORGANIZED HEALTH CARE EDUCATION/TRAINING PROGRAM

## 2022-10-28 PROCEDURE — 3017F COLORECTAL CA SCREEN DOC REV: CPT | Performed by: STUDENT IN AN ORGANIZED HEALTH CARE EDUCATION/TRAINING PROGRAM

## 2022-10-28 PROCEDURE — 3078F DIAST BP <80 MM HG: CPT | Performed by: STUDENT IN AN ORGANIZED HEALTH CARE EDUCATION/TRAINING PROGRAM

## 2022-10-28 PROCEDURE — 2022F DILAT RTA XM EVC RTNOPTHY: CPT | Performed by: STUDENT IN AN ORGANIZED HEALTH CARE EDUCATION/TRAINING PROGRAM

## 2022-10-28 PROCEDURE — G8752 SYS BP LESS 140: HCPCS | Performed by: STUDENT IN AN ORGANIZED HEALTH CARE EDUCATION/TRAINING PROGRAM

## 2022-10-28 PROCEDURE — 1101F PT FALLS ASSESS-DOCD LE1/YR: CPT | Performed by: STUDENT IN AN ORGANIZED HEALTH CARE EDUCATION/TRAINING PROGRAM

## 2022-10-28 PROCEDURE — G8432 DEP SCR NOT DOC, RNG: HCPCS | Performed by: STUDENT IN AN ORGANIZED HEALTH CARE EDUCATION/TRAINING PROGRAM

## 2022-10-28 PROCEDURE — G8536 NO DOC ELDER MAL SCRN: HCPCS | Performed by: STUDENT IN AN ORGANIZED HEALTH CARE EDUCATION/TRAINING PROGRAM

## 2022-10-28 PROCEDURE — G8427 DOCREV CUR MEDS BY ELIG CLIN: HCPCS | Performed by: STUDENT IN AN ORGANIZED HEALTH CARE EDUCATION/TRAINING PROGRAM

## 2022-10-28 PROCEDURE — 3052F HG A1C>EQUAL 8.0%<EQUAL 9.0%: CPT | Performed by: STUDENT IN AN ORGANIZED HEALTH CARE EDUCATION/TRAINING PROGRAM

## 2022-10-28 PROCEDURE — 1123F ACP DISCUSS/DSCN MKR DOCD: CPT | Performed by: STUDENT IN AN ORGANIZED HEALTH CARE EDUCATION/TRAINING PROGRAM

## 2022-10-28 NOTE — PROGRESS NOTES
[address] [facility] [address] [address]   Office [phone], Fax [phone]      Chief Complaint:     Chief Complaint   Patient presents with    Follow Up Chronic Condition     Patient here to follow up on medication. Alena Mark is a 78 y.o. male that presents for: HTN and DM follow up      Subjective:   HPI:  Alena Mark is a 78 y.o. male that presents for:       HTN follow up:   - taken off HCTZ as it made him dizzy, started on Norvasc 5mg last visit  - dizziness resolved after stopping HCTZ  - BP at home: 140s/150s but cuff is almost 8years old  - Denies CP, SOB, HA     DM:   - Diet: working on this more, eating more chicken and salads  - Exercise: golfing still  - A1C 8.2  on 9/2  - Wanted pt to stop glyburide and add SGLT2 but insurance only paid for a portion   - Called pharmacy back again and spoke with different pharmacist. $500+ for one month supply and that is AFTER insurance coverage. States he likely is in \"donut hole\" right now with coverage. Does not qualify for coupons. - patient has been taking BG at  home now: 120-140 is what he is mostly getting - most are fasting but also taking some in afternoon after lunch (previously in 200s). R ear:   - feels clogged, no pain  - hard time hearing on this side  - afraid to stick q tip in his ear too deep to clean it    ROS: See HPI    Past medical history, social history, medications, and allergies personally reviewed.   Past Medical History:   Diagnosis Date    Hypercholesterolemia     Hypertension     Nonspecific abnormal electrocardiogram (ECG) (EKG) 3/17/2011    Other dyspnea and respiratory abnormality 3/17/2011    Pre-operative cardiovascular examination 3/17/2011    Type II or unspecified type diabetes mellitus without mention of complication, not stated as uncontrolled 3/17/2011      Medications:   Current Outpatient Medications   Medication Sig    glyBURIDE-metFORMIN (GLUCOVANCE) 5-500 mg per tablet Take 2 Tablets by mouth daily (with breakfast) AND 1 Tablet daily (with dinner). amLODIPine (NORVASC) 5 mg tablet Take 1 Tablet by mouth daily. lisinopriL (PRINIVIL, ZESTRIL) 20 mg tablet Take 1 Tablet by mouth daily. atorvastatin (LIPITOR) 40 mg tablet TAKE 1 TABLET BY MOUTH EVERY DAY    cholecalciferol (VITAMIN D3) 25 mcg (1,000 unit) cap Take  by mouth two (2) times a day. BABY ASPIRIN PO Take 81 mg by mouth daily. ONETOUCH ULTRA TEST strip USE UTD TO CHECK BID    ONETOUCH ULTRA2 monitoring kit USE UTD    ONE TOUCH DELICA 33 gauge misc USE FOR TESTING    varicella-zoster recombinant, PF, (SHINGRIX, PF,) 50 mcg/0.5 mL susr injection First dose administer today. 2nd dose to be administered 2-6 mos after initial dose. (Patient not taking: No sig reported)    ciclopirox (PENLAC) 8 % solution as needed. co-enzyme Q-10 (CO Q-10) 100 mg capsule TAKE 1 CAPSULE BY MOUTH EVERY DAY BEFORE A MEAL (Patient not taking: Reported on 10/28/2022)     No current facility-administered medications for this visit. Allergies:  No Known Allergies     Health Maintenance:  Health Maintenance Due   Topic Date Due    COVID-19 Vaccine (1) Never done    Eye Exam Retinal or Dilated  Never done    Colorectal Cancer Screening Combo  Never done    Shingrix Vaccine Age 50> (2 of 2) 01/08/2020    Flu Vaccine (1) 08/01/2022    Medicare Yearly Exam  Never done    MICROALBUMIN Q1  11/03/2022    Lipid Screen  11/03/2022        Objective:   Vitals reviewed. Visit Vitals  /74 (BP 1 Location: Right arm, BP Patient Position: Sitting, BP Cuff Size: Large adult)   Pulse 73   Temp 97 °F (36.1 °C)   Resp 15   Ht 6' (1.829 m)   Wt 256 lb 9.6 oz (116.4 kg)   SpO2 97%   BMI 34.80 kg/m²        Physical Exam:  General Alert. No distress. Not diaphoretic. No jaundice, cyanosis, pallor. HENT Head Normocephalic and atraumatic. Ears    Unable to visualize R eardrum due to cerumen impaction  Eyes Conjunctivae pink, no discharge. No scleral icterus. EOMI. Cardio Normal rate, regular rhythm. No murmur, gallop, or friction rub. No chest wall tenderness. Pulmonary Effort normal. Breath sounds normal. No respiratory distress. No wheezes, rales, or rhonchi. No Stridor. Neurological No focal deficits. Skin Skin is warm and dry. No rash noted. No erythema. Psychiatric Mood, affect, and judgment normal.        Assessment/Plan:     1. Type 2 diabetes mellitus with chronic kidney disease, without long-term current use of insulin, unspecified CKD stage ([facility] [address]  Unable to get him SGLT2i, though he is starting open enrollment and will be switching plans to hopefully help cover this. Dicussed that given his kidney function I really want him on an SGLT2i. He will continue working on diet and exercise and keep taking glyburide-metformin in the interim. 2. Essential hypertension  BP looks good here today. Will get new cuff. Will send me values if staying elevated at home and will make adjustments. 3. Impacted cerumen of right ear   Cleared out by my nurse. Able to visualize tympanic membrane, hearing returned to normal after. Follow up:   Return in about 2 months (around 12/28/2022) for A1C follow up. Can complete medicare wellness visit at that time. Pt was discussed with Dr Janee Mcmahon (attending physician). I have reviewed pertinent labs results and other data. I have discussed the diagnosis with the patient and the intended plan as seen in the above orders. The patient has received an after-visit summary and questions were answered concerning future plans. I have discussed medication side effects and warnings with the patient as well.     Hiwot Brower MD  Resident [facility]  10/31/22

## 2022-10-28 NOTE — PROGRESS NOTES
Fransisco Morse is a 78 y.o. male    Chief Complaint   Patient presents with    Follow Up Chronic Condition     Patient here to follow up on medication. 1. Have you been to the ER, urgent care clinic since your last visit? Hospitalized since your last visit? No  2. Have you seen or consulted any other health care providers outside of the [address] [place] [address] since your last visit? Include any pap smears or colon screening.  No    Visit Vitals  /74 (BP 1 Location: Right arm, BP Patient Position: Sitting, BP Cuff Size: Large adult)   Pulse 73   Temp 97 °F (36.1 °C)   Resp 15   Ht 6' (1.829 m)   Wt 256 lb 9.6 oz (116.4 kg)   SpO2 97%   BMI 34.80 kg/m²     3 most recent PHQ Screens 9/2/2022   Little interest or pleasure in doing things Not at all   Feeling down, depressed, irritable, or hopeless Not at all   Total Score PHQ 2 0     Health Maintenance Due   Topic Date Due    COVID-19 Vaccine (1) Never done    Eye Exam Retinal or Dilated  Never done    Colorectal Cancer Screening Combo  Never done    Shingrix Vaccine Age 50> (2 of 2) 01/08/2020    Flu Vaccine (1) 08/01/2022    Medicare Yearly Exam  Never done    MICROALBUMIN Q1  11/03/2022    Lipid Screen  11/03/2022

## 2022-10-31 RX ORDER — GLYBURIDE-METFORMIN HYDROCHLORIDE 5; 500 MG/1; MG/1
TABLET ORAL
Qty: 90 TABLET | Refills: 0 | Status: SHIPPED | OUTPATIENT
Start: 2022-10-31

## 2022-11-02 NOTE — PROGRESS NOTES
[address] [facility] at [address] [place] [address]  [phone]    Hematology / Oncology New Patient Visit    Reason for Visit:   Billy Euceda is a 78 y.o. male who is seen for evaluation of thrombocytopenia. PCP is Dr. Rizwan Crump (Family Practice)    History of Present Illness:   Billy Euceda is a 78 y.o. male who is here for evaluation of thrombocytopenia. Medical history significant for HTN, DM and CKD. Review of labs notable for mildly low platelet count ranging from 125-140 since 2020. No personal or family h/o autoimmune disorders. No blood in stools, urine. No hematuria, epistaxis, gum bleeding. No recent or recurrent infections. No sweats, unintentional weight loss. Denies any significant EtOH use. Has occasional wine or beer, every couple months. No h/o liver disease. He states his diabetes is not well controlled due to dietary indiscretions. He states he is already working on improving this. Past Medical History:   Diagnosis Date    Hypercholesterolemia     Hypertension     Nonspecific abnormal electrocardiogram (ECG) (EKG) 3/17/2011    Other dyspnea and respiratory abnormality 3/17/2011    Pre-operative cardiovascular examination 3/17/2011    Type II or unspecified type diabetes mellitus without mention of complication, not stated as uncontrolled 3/17/2011      No past surgical history on file. Social History     Tobacco Use    Smoking status: Former    Smokeless tobacco: Never   Substance Use Topics    Alcohol use: Yes     Comment: ocasional      Family History   Problem Relation Age of Onset    Elevated Lipids Father      Current Outpatient Medications   Medication Sig    glyBURIDE-metFORMIN (GLUCOVANCE) 5-500 mg per tablet Take 2 Tablets by mouth daily (with breakfast) AND 1 Tablet daily (with dinner). amLODIPine (NORVASC) 5 mg tablet Take 1 Tablet by mouth daily. lisinopriL (PRINIVIL, ZESTRIL) 20 mg tablet Take 1 Tablet by mouth daily.     atorvastatin (LIPITOR) 40 mg tablet TAKE 1 TABLET BY MOUTH EVERY DAY    varicella-zoster recombinant, PF, (SHINGRIX, PF,) 50 mcg/0.5 mL susr injection First dose administer today. 2nd dose to be administered 2-6 mos after initial dose. (Patient not taking: No sig reported)    cholecalciferol (VITAMIN D3) 25 mcg (1,000 unit) cap Take  by mouth two (2) times a day. BABY ASPIRIN PO Take 81 mg by mouth daily. ONETOUCH ULTRA TEST strip USE UTD TO CHECK BID    ONETOUCH ULTRA2 monitoring kit USE UTD    ciclopirox (PENLAC) 8 % solution as needed. co-enzyme Q-10 (CO Q-10) 100 mg capsule TAKE 1 CAPSULE BY MOUTH EVERY DAY BEFORE A MEAL (Patient not taking: Reported on 10/28/2022)    ONE TOUCH DELICA 33 gauge misc USE FOR TESTING     No current facility-administered medications for this visit. No Known Allergies     Review of Systems: A complete review of systems was obtained, negative except as described above. Physical Exam:   Blood pressure 135/77, pulse 69, temperature 97.8 °F (36.6 °C), temperature source Temporal, height 6' (1.829 m), weight 257 lb (116.6 kg), SpO2 97 %. ECOG PS: 0  General: Well developed, no acute distress  Eyes: PERRLA, EOMI, anicteric sclerae  HENT: Atraumatic, OP clear  Neck: Supple, no JVD or thyromegaly  Lymphatic: No cervical, supraclavicular, axillary adenopathy  Respiratory: CTAB, normal respiratory effort  CV: Normal rate, regular rhythm, no murmurs, trace pitting edema in bilat ankles, R > L  GI: Soft, nontender, nondistended, no masses, no hepatomegaly, no splenomegaly  MS: Normal gait and station. Digits without clubbing or cyanosis. Skin: No rashes, ecchymoses, or petechiae. Normal temperature, turgor, and texture. Neuro/Psych: Alert, oriented. 5/5 strength in all 4 extremities. Appropriate affect, normal judgment/insight.       Results:     Lab Results   Component Value Date/Time    WBC 7.0 09/06/2022 08:35 AM    HGB 15.1 09/06/2022 08:35 AM    HCT 46.8 09/06/2022 08:35 AM    PLATELET 995 (L) 09/06/2022 08:35 AM    MCV 91.4 09/06/2022 08:35 AM    ABS. NEUTROPHILS 4.2 09/06/2022 08:35 AM     Lab Results   Component Value Date/Time    Sodium 136 09/26/2022 11:55 AM    Potassium 4.7 09/26/2022 11:55 AM    Chloride 106 09/26/2022 11:55 AM    CO2 24 09/26/2022 11:55 AM    Glucose 142 (H) 09/26/2022 11:55 AM    BUN 24 (H) 09/26/2022 11:55 AM    Creatinine 1.30 09/26/2022 11:55 AM    GFR est AA >60 09/26/2022 11:55 AM    GFR est non-AA 55 (L) 09/26/2022 11:55 AM    Calcium 8.9 09/26/2022 11:55 AM     Lab Results   Component Value Date/Time    Bilirubin, total 1.0 09/06/2022 08:35 AM    ALT (SGPT) 69 09/06/2022 08:35 AM    Alk. phosphatase 72 09/06/2022 08:35 AM    Protein, total 7.2 09/06/2022 08:35 AM    Albumin 4.2 09/06/2022 08:35 AM    Globulin 3.0 09/06/2022 08:35 AM     No results found for: IRON, FE, TIBC, IBCT, PSAT, FERR    No results found for: B12LT, FOL, RBCF  No results found for: TSH, TSH2, TSH3, TSHP, TSHEXT  Lab Results   Component Value Date/Time    Hepatitis A, IgM NONREACTIVE 09/06/2022 08:35 AM    Hepatitis B surface Ag 0.12 09/06/2022 08:35 AM    Hepatitis B core, IgM NONREACTIVE 09/06/2022 08:35 AM     9/6/22 Blood smear:        Normochromic normocytic red blood cells        Mild thrombocytopenia with morphologically unremarkable platelets. No clumps identified        No circulating blasts identified     Imaging:     Radiology report(s) reviewed. Assessment & Plan:   Lluvia Elaine is a 78 y.o. male here for evaluation of thrombocytopenia    Thrombocytopenia:  The differential diagnosis for thrombocytopenia is broad, and can be generally broken down into three groups: Decreased Production, Increased Consumption, or Pseudothrombocytopenia. Causes of decreased production include infection, HIV, etoh toxicity, B12 and folate deficiency, and primary bone marrow disorders.   Causes of increased consumption include various drugs, DIC, TTP, ITP, APLA, splenomegaly, and physical destruction related to valvular disease, aneurysms, etc.  Pseudothrombocytopenia is a laboratory artifact and can be ruled out by a smear review. Hep B/C profiles negative. Peripheral smear unremarkable. I will start by obtaining some labs to explore some of these possibilities. -- Check JESSE, B12, folate, H. Pylori, gammopathy eval - release results on MyChart  -- Abdomen ultrasound - release on MyChart  -- Return in 1 year with repeat CBC. 2. Type II DM:  Diagnosed approx 2010. On Glyburide-Metformin with HgbA1c 8.2.     3. HTN:  Well controlled on current regimen. I appreciate the opportunity to participate in Mr. Jamie Don's care.     Signed By: Felicity Flores MD     November 3, 2022

## 2022-11-03 ENCOUNTER — OFFICE VISIT (OUTPATIENT)
Dept: ONCOLOGY | Age: 67
End: 2022-11-03
Payer: MEDICARE

## 2022-11-03 VITALS
HEART RATE: 69 BPM | DIASTOLIC BLOOD PRESSURE: 77 MMHG | TEMPERATURE: 97.8 F | HEIGHT: 72 IN | OXYGEN SATURATION: 97 % | BODY MASS INDEX: 34.81 KG/M2 | SYSTOLIC BLOOD PRESSURE: 135 MMHG | WEIGHT: 257 LBS

## 2022-11-03 DIAGNOSIS — E11.65 TYPE 2 DIABETES MELLITUS WITH HYPERGLYCEMIA, WITHOUT LONG-TERM CURRENT USE OF INSULIN (HCC): ICD-10-CM

## 2022-11-03 DIAGNOSIS — D69.6 THROMBOCYTOPENIA (HCC): Primary | ICD-10-CM

## 2022-11-03 DIAGNOSIS — I10 PRIMARY HYPERTENSION: ICD-10-CM

## 2022-11-03 PROCEDURE — G8752 SYS BP LESS 140: HCPCS | Performed by: INTERNAL MEDICINE

## 2022-11-03 PROCEDURE — 3074F SYST BP LT 130 MM HG: CPT | Performed by: INTERNAL MEDICINE

## 2022-11-03 PROCEDURE — 99204 OFFICE O/P NEW MOD 45 MIN: CPT | Performed by: INTERNAL MEDICINE

## 2022-11-03 PROCEDURE — G8427 DOCREV CUR MEDS BY ELIG CLIN: HCPCS | Performed by: INTERNAL MEDICINE

## 2022-11-03 PROCEDURE — G0463 HOSPITAL OUTPT CLINIC VISIT: HCPCS | Performed by: INTERNAL MEDICINE

## 2022-11-03 PROCEDURE — G8754 DIAS BP LESS 90: HCPCS | Performed by: INTERNAL MEDICINE

## 2022-11-03 PROCEDURE — 1101F PT FALLS ASSESS-DOCD LE1/YR: CPT | Performed by: INTERNAL MEDICINE

## 2022-11-03 PROCEDURE — 3078F DIAST BP <80 MM HG: CPT | Performed by: INTERNAL MEDICINE

## 2022-11-03 PROCEDURE — G8417 CALC BMI ABV UP PARAM F/U: HCPCS | Performed by: INTERNAL MEDICINE

## 2022-11-03 PROCEDURE — G8510 SCR DEP NEG, NO PLAN REQD: HCPCS | Performed by: INTERNAL MEDICINE

## 2022-11-03 PROCEDURE — G8536 NO DOC ELDER MAL SCRN: HCPCS | Performed by: INTERNAL MEDICINE

## 2022-11-03 PROCEDURE — 3017F COLORECTAL CA SCREEN DOC REV: CPT | Performed by: INTERNAL MEDICINE

## 2022-11-03 PROCEDURE — 2022F DILAT RTA XM EVC RTNOPTHY: CPT | Performed by: INTERNAL MEDICINE

## 2022-11-03 PROCEDURE — 3052F HG A1C>EQUAL 8.0%<EQUAL 9.0%: CPT | Performed by: INTERNAL MEDICINE

## 2022-11-03 PROCEDURE — 1123F ACP DISCUSS/DSCN MKR DOCD: CPT | Performed by: INTERNAL MEDICINE

## 2022-11-03 NOTE — PROGRESS NOTES
Identified pt with two pt identifiers. Reviewed record in preparation for visit and have obtained necessary documentation. All patient medications has been reviewed. Chief Complaint   Patient presents with    New Patient    Thrombocytopenia     Additional information about chief complaint:    Visit Vitals  /77 (BP 1 Location: Right upper arm, BP Patient Position: Sitting, BP Cuff Size: Large adult)   Pulse 69   Temp 97.8 °F (36.6 °C) (Temporal)   Ht 6' (1.829 m)   Wt 257 lb (116.6 kg)   SpO2 97%   BMI 34.86 kg/m²       Health Maintenance Due   Topic    COVID-19 Vaccine (1)    Eye Exam Retinal or Dilated     Colorectal Cancer Screening Combo     Shingrix Vaccine Age 50> (2 of 2)    Flu Vaccine (1)    Medicare Yearly Exam     MICROALBUMIN Q1     Lipid Screen        1. Have you been to the ER, urgent care clinic since your last visit? Hospitalized since your last visit? N/a    2. Have you seen or consulted any other health care providers outside of the [address] [place] [address] since your last visit? Include any pap smears or colon screening.  N/a

## 2022-11-03 NOTE — LETTER
11/3/2022    Patient: Daryn Montejo   YOB: 1955   Date of Visit: 11/3/2022     Oscar Poon, [facility] [address]  Via In [address] [place] [id number]  Via In [facility] [address]    Dear MD Joyce Crowder MD,      Thank you for referring Mr. Daryn Montejo to [address] for evaluation. My notes for this consultation are attached. If you have questions, please do not hesitate to call me. I look forward to following your patient along with you.       Sincerely,    Dani Chaudhary MD

## 2022-11-08 ENCOUNTER — TELEPHONE (OUTPATIENT)
Dept: ONCOLOGY | Age: 67
End: 2022-11-08

## 2022-11-08 DIAGNOSIS — R76.0 POSITIVE H. PYLORI TITER: Primary | ICD-10-CM

## 2022-11-10 ENCOUNTER — TELEPHONE (OUTPATIENT)
Dept: ONCOLOGY | Age: 67
End: 2022-11-10

## 2022-11-10 NOTE — TELEPHONE ENCOUNTER
[address] at [facility]  [phone]        11/10/22 4:14 PM Return call placed to patient. See result note.

## 2022-11-16 ENCOUNTER — HOSPITAL ENCOUNTER (OUTPATIENT)
Dept: ULTRASOUND IMAGING | Age: 67
Discharge: HOME | End: 2022-11-16
Attending: INTERNAL MEDICINE
Payer: MEDICARE

## 2022-11-16 DIAGNOSIS — D69.6 THROMBOCYTOPENIA (HCC): ICD-10-CM

## 2022-11-16 PROCEDURE — 76700 US EXAM ABDOM COMPLETE: CPT

## 2022-12-15 DIAGNOSIS — E11.22 TYPE 2 DIABETES MELLITUS WITH CHRONIC KIDNEY DISEASE, WITHOUT LONG-TERM CURRENT USE OF INSULIN, UNSPECIFIED CKD STAGE (HCC): ICD-10-CM

## 2022-12-15 RX ORDER — GLYBURIDE-METFORMIN HYDROCHLORIDE 5; 500 MG/1; MG/1
TABLET ORAL
Qty: 90 TABLET | Refills: 0 | Status: SHIPPED | OUTPATIENT
Start: 2022-12-15

## 2022-12-30 DIAGNOSIS — I10 ESSENTIAL HYPERTENSION: ICD-10-CM

## 2022-12-30 RX ORDER — AMLODIPINE BESYLATE 5 MG/1
TABLET ORAL
Qty: 90 TABLET | Refills: 0 | Status: SHIPPED | OUTPATIENT
Start: 2022-12-30

## 2023-02-18 DIAGNOSIS — E11.22 TYPE 2 DIABETES MELLITUS WITH CHRONIC KIDNEY DISEASE, WITHOUT LONG-TERM CURRENT USE OF INSULIN, UNSPECIFIED CKD STAGE (HCC): ICD-10-CM

## 2023-02-18 DIAGNOSIS — I10 ESSENTIAL HYPERTENSION: ICD-10-CM

## 2023-02-20 RX ORDER — GLYBURIDE-METFORMIN HYDROCHLORIDE 5; 500 MG/1; MG/1
TABLET ORAL
Qty: 90 TABLET | Refills: 0 | Status: CANCELLED | OUTPATIENT
Start: 2023-02-20

## 2023-02-26 RX ORDER — LISINOPRIL 20 MG/1
20 TABLET ORAL DAILY
Qty: 90 TABLET | Refills: 1 | Status: SHIPPED | OUTPATIENT
Start: 2023-02-26

## 2023-04-22 DIAGNOSIS — D69.6 THROMBOCYTOPENIA (HCC): Primary | ICD-10-CM

## 2023-05-01 ENCOUNTER — OFFICE VISIT (OUTPATIENT)
Dept: FAMILY MEDICINE CLINIC | Age: 68
End: 2023-05-01

## 2023-05-01 VITALS
HEIGHT: 72 IN | HEART RATE: 65 BPM | BODY MASS INDEX: 34.75 KG/M2 | DIASTOLIC BLOOD PRESSURE: 82 MMHG | RESPIRATION RATE: 14 BRPM | SYSTOLIC BLOOD PRESSURE: 136 MMHG | OXYGEN SATURATION: 98 % | TEMPERATURE: 98 F | WEIGHT: 256.6 LBS

## 2023-05-01 DIAGNOSIS — R01.1 FLOW MURMUR: ICD-10-CM

## 2023-05-01 DIAGNOSIS — E11.22 TYPE 2 DIABETES MELLITUS WITH CHRONIC KIDNEY DISEASE, WITHOUT LONG-TERM CURRENT USE OF INSULIN, UNSPECIFIED CKD STAGE (HCC): Primary | ICD-10-CM

## 2023-05-01 DIAGNOSIS — I10 ESSENTIAL HYPERTENSION: ICD-10-CM

## 2023-05-01 NOTE — PROGRESS NOTES
[facility] [address] [place] [address]   Office [phone], Fax [phone]      Chief Complaint:     Chief Complaint   Patient presents with    Follow Up Chronic Condition       Subjective:   HPI:  Susan Delvalle is a 75 y.o. male that presents for:    HTN:  - BP at home: 130/70s  - Current medications: Norvasc, lisinopril  - Exercise: walks dogs daily, golfing 1x per week  - Denies chest pain, SOB  - trace LE edema that comes and goes    Diabetes:  - Current medications: glyburide-metformin 5-500mg, atorvastatin  - Last A1C:    Lab Results   Component Value Date/Time    Hemoglobin A1c 8.2 (H) 09/06/2022 08:35 AM   - Last eye exam: September - no retinopathy   - Last foot exam: September   - Wanted pt to stop glyburide and add SGLT2 but insurance only paid for a portion      ROS: See HPI for pertinent ROS. Past medical history, social history, medications, and allergies personally reviewed. Past Medical History:   Diagnosis Date    Hypercholesterolemia     Hypertension     Nonspecific abnormal electrocardiogram (ECG) (EKG) 3/17/2011    Other dyspnea and respiratory abnormality 3/17/2011    Pre-operative cardiovascular examination 3/17/2011    Type II or unspecified type diabetes mellitus without mention of complication, not stated as uncontrolled 3/17/2011        Social Hx:   Alcohol Use: Not on file     Tobacco Use: Medium Risk    Smoking Tobacco Use: Former    Smokeless Tobacco Use: Never    Passive Exposure: Not on file         Medications:   Current Outpatient Medications   Medication Sig    amLODIPine (NORVASC) 5 mg tablet Take 1 Tablet by mouth daily. lisinopriL (PRINIVIL, ZESTRIL) 20 mg tablet Take 1 Tablet by mouth daily. glyBURIDE-metFORMIN (GLUCOVANCE) 5-500 mg per tablet Take 2 Tablets by mouth daily (with breakfast) AND 1 Tablet daily (with dinner).     atorvastatin (LIPITOR) 40 mg tablet TAKE 1 TABLET BY MOUTH EVERY DAY    cholecalciferol (VITAMIN D3) 25 mcg (1,000 unit) cap Take by mouth two (2) times a day. BABY ASPIRIN PO Take 81 mg by mouth daily. ONETOUCH ULTRA TEST strip USE UTD TO CHECK BID    ONETOUCH ULTRA2 monitoring kit USE UTD    ONE TOUCH DELICA 33 gauge misc USE FOR TESTING    varicella-zoster recombinant, PF, (SHINGRIX, PF,) 50 mcg/0.5 mL susr injection First dose administer today. 2nd dose to be administered 2-6 mos after initial dose. (Patient not taking: Reported on 9/2/2022)    ciclopirox (PENLAC) 8 % solution as needed. co-enzyme Q-10 (CO Q-10) 100 mg capsule TAKE 1 CAPSULE BY MOUTH EVERY DAY BEFORE A MEAL (Patient not taking: Reported on 10/28/2022)     No current facility-administered medications for this visit. Allergies:  No Known Allergies     Health Maintenance:  Health Maintenance Due   Topic Date Due    COVID-19 Vaccine (1) Never done    Eye Exam Retinal or Dilated  Never done    Colorectal Cancer Screening Combo  Never done    Shingles Vaccine (2 of 2) 01/08/2020    Medicare Yearly Exam  Never done    Diabetic Alb to Cr ratio (uACR) test  11/03/2022    Lipid Screen  11/03/2022      Objective:   Vitals reviewed. Visit Vitals  /82 (BP 1 Location: Left upper arm, BP Patient Position: Sitting, BP Cuff Size: Adult)   Pulse 65   Temp 98 °F (36.7 °C)   Resp 14   Ht 6' (1.829 m)   Wt 256 lb 9.6 oz (116.4 kg)   SpO2 98%   BMI 34.80 kg/m²        Physical Exam:  General Alert. No distress. Not diaphoretic. No jaundice, cyanosis, pallor. Thyroid  No thyroid enlargement or tenderness. HENT Head    Normocephalic and atraumatic. Eyes    Conjunctivae pink, no discharge. No scleral icterus. EOMI. Cardio Normal rate, regular rhythm. Flow murmur noted. No gallop, or friction rub. No chest wall tenderness. Pulmonary Effort normal. No respiratory distress. No wheezes, rhales, or rhonchi. Abdominal Soft. Bowel sounds normal. No distension. No tenderness.  Deferred. Extremities Mild bilateral edema of lower extremities. No erythema. Neurological No focal deficits. Skin Skin is warm and dry. No rash noted. No erythema. Psychiatric Mood, affect, and judgment normal.        Assessment/Plan:     1. Type 2 diabetes mellitus with chronic kidney disease, without long-term current use of insulin, unspecified CKD stage ([address]  Previously not at A1C goal. Wanted to started [place] but insurance was an issue. Will reassess A1C today. Due for lipid panel and A/C ratio as well. Previous CKD, will assess for stability today.  -     HEMOGLOBIN A1C WITH EAG; Future  -     METABOLIC PANEL, COMPREHENSIVE; Future  -     MICROALBUMIN, UR, RAND W/ MICROALB/CREAT RATIO; Future  -     LIPID PANEL; Future    2. Flow murmur: Noted on prior exams. Was waiting to receive cardiology records but never got them. Last echo 5 years ago. Patient reports that workup at that time was benign.  -     ECHO ADULT COMPLETE; Future    3. Essential hypertension: well controlled, continue current medications. Follow up:   Return in about 3 months (around 8/1/2023) for DM, HTN. Pt was discussed with Dr. Ladan Ruelas (attending physician). I have reviewed pertinent labs results and other data. I have discussed the diagnosis with the patient and the intended plan as seen in the above orders. The patient has received an after-visit summary and questions were answered concerning future plans. I have discussed medication side effects and warnings with the patient as well.     Katya Silver,   PGY-2 Resident - [facility]  05/01/23

## 2023-05-01 NOTE — PROGRESS NOTES
Ivy Dean is a 75 y.o. male    Chief Complaint   Patient presents with    Follow Up Chronic Condition       1. Have you been to the ER, urgent care clinic since your last visit? Hospitalized since your last visit? No  2. Have you seen or consulted any other health care providers outside of the [address] [place] [address] since your last visit? Include any pap smears or colon screening.  No    Visit Vitals  /82 (BP 1 Location: Left upper arm, BP Patient Position: Sitting, BP Cuff Size: Adult)   Pulse 65   Temp 98 °F (36.7 °C)   Resp 14   Ht 6' (1.829 m)   Wt 256 lb 9.6 oz (116.4 kg)   SpO2 98%   BMI 34.80 kg/m²     3 most recent PHQ Screens 5/1/2023   Little interest or pleasure in doing things Not at all   Feeling down, depressed, irritable, or hopeless Not at all   Total Score PHQ 2 0     Health Maintenance Due   Topic Date Due    COVID-19 Vaccine (1) Never done    Eye Exam Retinal or Dilated  Never done    Colorectal Cancer Screening Combo  Never done    Shingles Vaccine (2 of 2) 01/08/2020    Medicare Yearly Exam  Never done    Diabetic Alb to Cr ratio (uACR) test  11/03/2022    Lipid Screen  11/03/2022

## 2023-05-02 LAB
ALBUMIN SERPL-MCNC: 4.2 G/DL (ref 3.5–5)
ALBUMIN/GLOB SERPL: 1.4 (ref 1.1–2.2)
ALP SERPL-CCNC: 64 U/L (ref 45–117)
ALT SERPL-CCNC: 56 U/L (ref 12–78)
ANION GAP SERPL CALC-SCNC: 3 MMOL/L (ref 5–15)
AST SERPL-CCNC: 38 U/L (ref 15–37)
BILIRUB SERPL-MCNC: 0.9 MG/DL (ref 0.2–1)
BUN SERPL-MCNC: 22 MG/DL (ref 6–20)
BUN/CREAT SERPL: 17 (ref 12–20)
CALCIUM SERPL-MCNC: 9.3 MG/DL (ref 8.5–10.1)
CHLORIDE SERPL-SCNC: 105 MMOL/L (ref 97–108)
CHOLEST SERPL-MCNC: 141 MG/DL
CO2 SERPL-SCNC: 27 MMOL/L (ref 21–32)
CREAT SERPL-MCNC: 1.31 MG/DL (ref 0.7–1.3)
CREAT UR-MCNC: 138 MG/DL
EST. AVERAGE GLUCOSE BLD GHB EST-MCNC: 151 MG/DL
GLOBULIN SER CALC-MCNC: 3 G/DL (ref 2–4)
GLUCOSE SERPL-MCNC: 164 MG/DL (ref 65–100)
HBA1C MFR BLD: 6.9 % (ref 4–5.6)
HDLC SERPL-MCNC: 40 MG/DL
HDLC SERPL: 3.5 (ref 0–5)
LDLC SERPL CALC-MCNC: 73 MG/DL (ref 0–100)
MICROALBUMIN UR-MCNC: 0.91 MG/DL
MICROALBUMIN/CREAT UR-RTO: 7 MG/G (ref 0–30)
POTASSIUM SERPL-SCNC: 5.5 MMOL/L (ref 3.5–5.1)
PROT SERPL-MCNC: 7.2 G/DL (ref 6.4–8.2)
SODIUM SERPL-SCNC: 135 MMOL/L (ref 136–145)
TRIGL SERPL-MCNC: 140 MG/DL (ref ?–150)
VLDLC SERPL CALC-MCNC: 28 MG/DL

## 2023-05-04 ENCOUNTER — OFFICE VISIT (OUTPATIENT)
Dept: FAMILY MEDICINE CLINIC | Age: 68
End: 2023-05-04

## 2023-05-04 VITALS
TEMPERATURE: 98.6 F | SYSTOLIC BLOOD PRESSURE: 128 MMHG | OXYGEN SATURATION: 96 % | RESPIRATION RATE: 17 BRPM | BODY MASS INDEX: 34.54 KG/M2 | WEIGHT: 255 LBS | HEART RATE: 70 BPM | HEIGHT: 72 IN | DIASTOLIC BLOOD PRESSURE: 62 MMHG

## 2023-05-04 DIAGNOSIS — E87.5 HYPERKALEMIA: Primary | ICD-10-CM

## 2023-05-04 LAB
ANION GAP SERPL CALC-SCNC: 4 MMOL/L (ref 5–15)
BUN SERPL-MCNC: 23 MG/DL (ref 6–20)
BUN/CREAT SERPL: 17 (ref 12–20)
CALCIUM SERPL-MCNC: 9.7 MG/DL (ref 8.5–10.1)
CHLORIDE SERPL-SCNC: 106 MMOL/L (ref 97–108)
CO2 SERPL-SCNC: 29 MMOL/L (ref 21–32)
CREAT SERPL-MCNC: 1.39 MG/DL (ref 0.7–1.3)
GLUCOSE SERPL-MCNC: 126 MG/DL (ref 65–100)
POTASSIUM SERPL-SCNC: 5.4 MMOL/L (ref 3.5–5.1)
SODIUM SERPL-SCNC: 139 MMOL/L (ref 136–145)

## 2023-05-08 ENCOUNTER — TELEPHONE (OUTPATIENT)
Age: 68
End: 2023-05-08

## 2023-05-08 DIAGNOSIS — E87.5 HYPERKALEMIA: Primary | ICD-10-CM

## 2023-05-08 NOTE — TELEPHONE ENCOUNTER
Called patient to discuss results. K+ 5.5 now down to 5.4. Decrease lisinopril to 10mg daily, increase norvasc to 10mg - suspect hyperkalemia secondary to lisinopril. BP in 130s/60s mainly. No chest pain or palpitations. Return precautions discussed. Follow up labs in 2 weeks. Keep BP log, call if maintaining 140/90.     Sharon Cisneros, DO  PGY-2 Resident  [address] [facility]

## 2023-05-11 RX ORDER — GLYBURIDE-METFORMIN HYDROCHLORIDE 5; 500 MG/1; MG/1
TABLET ORAL
Qty: 270 TABLET | Refills: 1 | Status: SHIPPED | OUTPATIENT
Start: 2023-05-11

## 2023-05-23 RX ORDER — LISINOPRIL 10 MG/1
10 TABLET ORAL DAILY
Qty: 90 TABLET | Refills: 1 | Status: SHIPPED | OUTPATIENT
Start: 2023-05-23

## 2023-06-19 ENCOUNTER — TELEPHONE (OUTPATIENT)
Age: 68
End: 2023-06-19

## 2023-06-19 NOTE — TELEPHONE ENCOUNTER
Called patient to discuss mychart message. Has had chronic swelling in BLE but noticed more persistent now. R side has always been worse than L but appears more noticeable. R has had persistent sock edema for several weeks when previously this was intermittent. No pain, no redness. Discussed likely secondary to increasing Norvasc at [address]. Given denies any pain or redness will have him come in for evaluation tomorrow. ER precautions discussed.  Will adjust BP medications at that [address] and further workup as indicated by exam.     Jessy Colvin,   PGY-2 Resident  [facility]

## 2023-06-20 ENCOUNTER — OFFICE VISIT (OUTPATIENT)
Age: 68
End: 2023-06-20
Payer: MEDICARE

## 2023-06-20 VITALS
HEART RATE: 65 BPM | WEIGHT: 266.2 LBS | RESPIRATION RATE: 14 BRPM | DIASTOLIC BLOOD PRESSURE: 73 MMHG | SYSTOLIC BLOOD PRESSURE: 125 MMHG | HEIGHT: 72 IN | BODY MASS INDEX: 36.06 KG/M2 | TEMPERATURE: 97.6 F | OXYGEN SATURATION: 96 %

## 2023-06-20 DIAGNOSIS — I10 ESSENTIAL (PRIMARY) HYPERTENSION: Primary | ICD-10-CM

## 2023-06-20 DIAGNOSIS — B35.1 FUNGAL TOENAIL INFECTION: ICD-10-CM

## 2023-06-20 DIAGNOSIS — R60.0 BILATERAL LEG EDEMA: ICD-10-CM

## 2023-06-20 DIAGNOSIS — T88.7XXA SIDE EFFECT OF MEDICATION: ICD-10-CM

## 2023-06-20 PROBLEM — E66.01 SEVERE OBESITY (BMI 35.0-39.9) WITH COMORBIDITY (HCC): Status: ACTIVE | Noted: 2023-06-20

## 2023-06-20 LAB
ANION GAP SERPL CALC-SCNC: 4 MMOL/L (ref 5–15)
BUN SERPL-MCNC: 20 MG/DL (ref 6–20)
BUN/CREAT SERPL: 16 (ref 12–20)
CALCIUM SERPL-MCNC: 9.1 MG/DL (ref 8.5–10.1)
CHLORIDE SERPL-SCNC: 107 MMOL/L (ref 97–108)
CO2 SERPL-SCNC: 28 MMOL/L (ref 21–32)
CREAT SERPL-MCNC: 1.24 MG/DL (ref 0.7–1.3)
GLUCOSE SERPL-MCNC: 170 MG/DL (ref 65–100)
POTASSIUM SERPL-SCNC: 5.5 MMOL/L (ref 3.5–5.1)
SODIUM SERPL-SCNC: 139 MMOL/L (ref 136–145)

## 2023-06-20 PROCEDURE — 99214 OFFICE O/P EST MOD 30 MIN: CPT | Performed by: STUDENT IN AN ORGANIZED HEALTH CARE EDUCATION/TRAINING PROGRAM

## 2023-06-20 PROCEDURE — 1123F ACP DISCUSS/DSCN MKR DOCD: CPT | Performed by: FAMILY MEDICINE

## 2023-06-20 PROCEDURE — 3074F SYST BP LT 130 MM HG: CPT | Performed by: FAMILY MEDICINE

## 2023-06-20 PROCEDURE — 3017F COLORECTAL CA SCREEN DOC REV: CPT | Performed by: FAMILY MEDICINE

## 2023-06-20 PROCEDURE — 3078F DIAST BP <80 MM HG: CPT | Performed by: FAMILY MEDICINE

## 2023-06-20 PROCEDURE — G8427 DOCREV CUR MEDS BY ELIG CLIN: HCPCS | Performed by: FAMILY MEDICINE

## 2023-06-20 PROCEDURE — G8417 CALC BMI ABV UP PARAM F/U: HCPCS | Performed by: FAMILY MEDICINE

## 2023-06-20 PROCEDURE — 4004F PT TOBACCO SCREEN RCVD TLK: CPT | Performed by: FAMILY MEDICINE

## 2023-06-20 RX ORDER — AMLODIPINE BESYLATE 10 MG/1
10 TABLET ORAL DAILY
Qty: 90 TABLET | Refills: 1 | Status: SHIPPED | OUTPATIENT
Start: 2023-06-20

## 2023-06-20 SDOH — ECONOMIC STABILITY: FOOD INSECURITY: WITHIN THE PAST 12 MONTHS, YOU WORRIED THAT YOUR FOOD WOULD RUN OUT BEFORE YOU GOT MONEY TO BUY MORE.: NEVER TRUE

## 2023-06-20 SDOH — ECONOMIC STABILITY: INCOME INSECURITY: HOW HARD IS IT FOR YOU TO PAY FOR THE VERY BASICS LIKE FOOD, HOUSING, MEDICAL CARE, AND HEATING?: NOT HARD AT ALL

## 2023-06-20 SDOH — ECONOMIC STABILITY: FOOD INSECURITY: WITHIN THE PAST 12 MONTHS, THE FOOD YOU BOUGHT JUST DIDN'T LAST AND YOU DIDN'T HAVE MONEY TO GET MORE.: NEVER TRUE

## 2023-06-20 SDOH — ECONOMIC STABILITY: HOUSING INSECURITY
IN THE LAST 12 MONTHS, WAS THERE A TIME WHEN YOU DID NOT HAVE A STEADY PLACE TO SLEEP OR SLEPT IN A SHELTER (INCLUDING NOW)?: NO

## 2023-06-20 NOTE — PROGRESS NOTES
[address]  [address] [place] [address]   Office [phone], Fax [phone]      Chief Complaint:     Chief Complaint   Patient presents with    Medication Check     Swelling on both legs. Worse on right leg. Subjective:   HPI:  Anthony Yuen is a 75 y.o. male that presents for:    HTN:  - BP at home: 120s-130s/upper 60s  - Current medications: lisinopril 10, norvasc 10  - Denies chest pain, SOB  - RLE swelling worse since increasing Norvasc   - Has had chronic LE edema (R>L), but more constant now   - Not bothersome just wanted to bring it up - denies erythema, tenderness    ROS: See HPI for pertinent ROS. Past medical history, social history, medications, and allergies personally reviewed. Past Medical History:   Diagnosis Date    Hypercholesterolemia     Hypertension     Nonspecific abnormal electrocardiogram (ECG) (EKG) 3/17/2011    Other dyspnea and respiratory abnormality 3/17/2011    Pre-operative cardiovascular examination 3/17/2011    Type II or unspecified type diabetes mellitus without mention of complication, not stated as uncontrolled 3/17/2011       Social Hx:   Alcohol Use: Not on file     Tobacco Use: Medium Risk    Smoking Tobacco Use: Former    Smokeless Tobacco Use: Never    Passive Exposure: Not on file       Medications:   Current Outpatient Medications   Medication Sig    amLODIPine (NORVASC) 10 MG tablet Take 1 tablet by mouth daily    lisinopril (PRINIVIL;ZESTRIL) 10 MG tablet Take 1 tablet by mouth daily    glyBURIDE-metFORMIN (GLUCOVANCE) 5-500 MG per tablet TAKE 2 TABLETS BY MOUTH DAILY (WITH BREAKFAST) AND 1 TABLET DAILY (WITH DINNER).     BABY ASPIRIN PO Take 81 mg by mouth daily    atorvastatin (LIPITOR) 40 MG tablet TAKE 1 TABLET BY MOUTH EVERY DAY    vitamin D 25 MCG (1000 UT) CAPS Take by mouth 2 times daily    ciclopirox (PENLAC) 8 % solution Apply topically nightly    coenzyme Q10 100 MG CAPS capsule TAKE 1 CAPSULE BY MOUTH

## 2023-06-20 NOTE — PROGRESS NOTES
I saw and evaluated the patient, performing the key elements of the service on the date of service. I discussed the findings, assessment and plan with the resident and agree with the resident's findings and plan as documented in the resident's note.      eYni Donahue MD 6/20/2023

## 2023-06-20 NOTE — PROGRESS NOTES
Augusto Glaser is a 75 y.o. male    Chief Complaint   Patient presents with    Medication Check     Swelling on both legs. Worse on right leg. 1. Have you been to the ER, urgent care clinic since your last visit? Hospitalized since your last visit?no    2. Have you seen or consulted any other health care providers outside of the [address] [place] [address] since your last visit? Include any pap smears or colon screening. no    Vitals:    06/20/23 0806   BP: 125/73   Pulse: 65   Resp: 14   Temp: 97.6 °F (36.4 °C)   SpO2: 96%     No flowsheet data found.   Health Maintenance Due   Topic Date Due    COVID-19 Vaccine (1) Never done    Diabetic retinal exam  Never done    Colorectal Cancer Screen  Never done    AAA screen  Never done    Shingles vaccine (2 of 2) 01/08/2020    Annual Wellness Visit (AWV)  Never done

## 2023-06-21 ENCOUNTER — HOSPITAL ENCOUNTER (OUTPATIENT)
Facility: HOSPITAL | Age: 68
Discharge: HOME | End: 2023-06-23
Payer: MEDICARE

## 2023-06-21 ENCOUNTER — TELEPHONE (OUTPATIENT)
Age: 68
End: 2023-06-21

## 2023-06-21 VITALS
SYSTOLIC BLOOD PRESSURE: 144 MMHG | WEIGHT: 266 LBS | HEART RATE: 65 BPM | HEIGHT: 72 IN | BODY MASS INDEX: 36.03 KG/M2 | DIASTOLIC BLOOD PRESSURE: 69 MMHG

## 2023-06-21 DIAGNOSIS — R01.1 FLOW MURMUR: ICD-10-CM

## 2023-06-21 LAB
ECHO AO ARCH DIAM: 3.1 CM
ECHO AO ASC DIAM: 3.6 CM
ECHO AO ASCENDING AORTA INDEX: 1.5 CM/M2
ECHO AV AREA PEAK VELOCITY: 2.4 CM2
ECHO AV AREA VTI: 2.5 CM2
ECHO AV AREA/BSA PEAK VELOCITY: 1 CM2/M2
ECHO AV AREA/BSA VTI: 1 CM2/M2
ECHO AV MEAN GRADIENT: 7 MMHG
ECHO AV MEAN VELOCITY: 1.2 M/S
ECHO AV PEAK GRADIENT: 12 MMHG
ECHO AV PEAK VELOCITY: 1.8 M/S
ECHO AV VELOCITY RATIO: 0.61
ECHO AV VTI: 41.5 CM
ECHO BSA: 2.48 M2
ECHO EST RA PRESSURE: 3 MMHG
ECHO LA DIAMETER INDEX: 1.58 CM/M2
ECHO LA DIAMETER: 3.8 CM
ECHO LA VOL 2C: 41 ML (ref 18–58)
ECHO LA VOL 2C: 42 ML (ref 18–58)
ECHO LA VOL 4C: 36 ML (ref 18–58)
ECHO LA VOL 4C: 38 ML (ref 18–58)
ECHO LA VOL BP: 39 ML (ref 18–58)
ECHO LA VOL/BSA BIPLANE: 16 ML/M2 (ref 16–34)
ECHO LA VOLUME AREA LENGTH: 40 ML
ECHO LA VOLUME INDEX AREA LENGTH: 17 ML/M2 (ref 16–34)
ECHO LV E' LATERAL VELOCITY: 9 CM/S
ECHO LV E' SEPTAL VELOCITY: 7 CM/S
ECHO LV EDV A2C: 98 ML
ECHO LV EDV A4C: 120 ML
ECHO LV EDV BP: 109 ML (ref 67–155)
ECHO LV EDV INDEX A4C: 50 ML/M2
ECHO LV EDV INDEX BP: 45 ML/M2
ECHO LV EDV NDEX A2C: 41 ML/M2
ECHO LV EJECTION FRACTION A2C: 64 %
ECHO LV EJECTION FRACTION A4C: 64 %
ECHO LV EJECTION FRACTION BIPLANE: 63 % (ref 55–100)
ECHO LV ESV A2C: 36 ML
ECHO LV ESV A4C: 43 ML
ECHO LV ESV BP: 41 ML (ref 22–58)
ECHO LV ESV INDEX A2C: 15 ML/M2
ECHO LV ESV INDEX A4C: 18 ML/M2
ECHO LV ESV INDEX BP: 17 ML/M2
ECHO LV FRACTIONAL SHORTENING: 42 % (ref 28–44)
ECHO LV INTERNAL DIMENSION DIASTOLE INDEX: 2 CM/M2
ECHO LV INTERNAL DIMENSION DIASTOLIC: 4.8 CM (ref 4.2–5.9)
ECHO LV INTERNAL DIMENSION SYSTOLIC INDEX: 1.17 CM/M2
ECHO LV INTERNAL DIMENSION SYSTOLIC: 2.8 CM
ECHO LV IVSD: 0.8 CM (ref 0.6–1)
ECHO LV MASS 2D: 116.6 G (ref 88–224)
ECHO LV MASS INDEX 2D: 48.6 G/M2 (ref 49–115)
ECHO LV POSTERIOR WALL DIASTOLIC: 0.7 CM (ref 0.6–1)
ECHO LV RELATIVE WALL THICKNESS RATIO: 0.29
ECHO LVOT AREA: 4.2 CM2
ECHO LVOT AV VTI INDEX: 0.62
ECHO LVOT DIAM: 2.3 CM
ECHO LVOT MEAN GRADIENT: 2 MMHG
ECHO LVOT PEAK GRADIENT: 4 MMHG
ECHO LVOT PEAK VELOCITY: 1.1 M/S
ECHO LVOT STROKE VOLUME INDEX: 44.8 ML/M2
ECHO LVOT SV: 107.6 ML
ECHO LVOT VTI: 25.9 CM
ECHO MV A VELOCITY: 0.7 M/S
ECHO MV E DECELERATION TIME (DT): 245.8 MS
ECHO MV E VELOCITY: 0.8 M/S
ECHO MV E/A RATIO: 1.14
ECHO MV E/E' LATERAL: 8.89
ECHO MV E/E' RATIO (AVERAGED): 10.16
ECHO MV E/E' SEPTAL: 11.43
ECHO PULMONARY ARTERY END DIASTOLIC PRESSURE: 1 MMHG
ECHO PV MAX VELOCITY: 1.2 M/S
ECHO PV PEAK GRADIENT: 6 MMHG
ECHO PV REGURGITANT MAX VELOCITY: 0.5 M/S
ECHO RIGHT VENTRICULAR SYSTOLIC PRESSURE (RVSP): 27 MMHG
ECHO RV FREE WALL PEAK S': 11 CM/S
ECHO RV INTERNAL DIMENSION: 4.3 CM
ECHO RV TAPSE: 2.7 CM (ref 1.7–?)
ECHO TV REGURGITANT MAX VELOCITY: 2.44 M/S
ECHO TV REGURGITANT PEAK GRADIENT: 24 MMHG

## 2023-06-21 PROCEDURE — 93306 TTE W/DOPPLER COMPLETE: CPT

## 2023-06-21 NOTE — TELEPHONE ENCOUNTER
Called patient on 6/20 at 7:39PM to discuss elevated potassium. Previously has been at this level, had decreased lisinopril but hyperkalemia persistent. Patient denies any current cardiac symptoms. Discussed ER precautions. Will review with attending and return call to patient.     Rojas Rios DO  PGY-2 Resident  [address] [facility]

## 2023-07-08 DIAGNOSIS — I10 ESSENTIAL (PRIMARY) HYPERTENSION: Primary | ICD-10-CM

## 2023-07-08 RX ORDER — HYDROCHLOROTHIAZIDE 12.5 MG/1
12.5 TABLET ORAL EVERY MORNING
Qty: 90 TABLET | Refills: 1 | Status: SHIPPED | OUTPATIENT
Start: 2023-07-08

## 2023-10-30 RX ORDER — GLYBURIDE-METFORMIN HYDROCHLORIDE 5; 500 MG/1; MG/1
TABLET ORAL
Qty: 270 TABLET | Refills: 1 | Status: SHIPPED | OUTPATIENT
Start: 2023-10-30

## 2023-11-17 DIAGNOSIS — I10 ESSENTIAL (PRIMARY) HYPERTENSION: ICD-10-CM

## 2023-11-17 RX ORDER — LISINOPRIL 20 MG/1
20 TABLET ORAL DAILY
Qty: 30 TABLET | Refills: 0 | OUTPATIENT
Start: 2023-11-17

## 2023-11-17 NOTE — TELEPHONE ENCOUNTER
Medication Refill Request    Ricardo Pat is requesting a refill of the following medication(s):   Requested Prescriptions     Pending Prescriptions Disp Refills    lisinopril (PRINIVIL;ZESTRIL) 20 MG tablet [Pharmacy Med Name: LISINOPRIL 20 MG TABLET] 90 tablet 1     Sig: TAKE 1 TABLET BY MOUTH EVERY DAY        Listed PCP is Griselda Layne DO   Last provider to prescribe medication: Griselda Layne MD  Last Date of Medication Prescribed: 05/23/2023 90 tabs x 1 refill   [facility]   FUTURE APPOINTMENT: No future appointments.    Please send refill to:    [facility]/pharmacy #[id number] - [place], [place] - [address] [facility] - P [phone] - F [phone] [address] [place] [address]  Phone: [phone] Fax: [phone]      Please review request and approve or deny with recommendations.

## 2023-11-28 ENCOUNTER — NURSE ONLY (OUTPATIENT)
Age: 68
End: 2023-11-28

## 2023-11-28 DIAGNOSIS — I10 ESSENTIAL (PRIMARY) HYPERTENSION: ICD-10-CM

## 2023-11-28 DIAGNOSIS — E87.5 HYPERKALEMIA: ICD-10-CM

## 2023-11-28 LAB
ANION GAP SERPL CALC-SCNC: 4 MMOL/L (ref 5–15)
BUN SERPL-MCNC: 28 MG/DL (ref 6–20)
BUN/CREAT SERPL: 20 (ref 12–20)
CALCIUM SERPL-MCNC: 8.9 MG/DL (ref 8.5–10.1)
CHLORIDE SERPL-SCNC: 105 MMOL/L (ref 97–108)
CO2 SERPL-SCNC: 27 MMOL/L (ref 21–32)
CREAT SERPL-MCNC: 1.4 MG/DL (ref 0.7–1.3)
GLUCOSE SERPL-MCNC: 140 MG/DL (ref 65–100)
POTASSIUM SERPL-SCNC: 4.9 MMOL/L (ref 3.5–5.1)
SODIUM SERPL-SCNC: 136 MMOL/L (ref 136–145)

## 2023-11-28 RX ORDER — ATORVASTATIN CALCIUM 40 MG/1
TABLET, FILM COATED ORAL
Qty: 90 TABLET | Refills: 2 | Status: SHIPPED | OUTPATIENT
Start: 2023-11-28

## 2023-11-28 RX ORDER — LISINOPRIL 10 MG/1
10 TABLET ORAL DAILY
Qty: 90 TABLET | Refills: 1 | Status: SHIPPED | OUTPATIENT
Start: 2023-11-28

## 2023-11-28 NOTE — TELEPHONE ENCOUNTER
Medication Refill Request    Emili Alexander is requesting a refill of the following medication(s):   Requested Prescriptions     Pending Prescriptions Disp Refills    atorvastatin (LIPITOR) 40 MG tablet [Pharmacy Med Name: ATORVASTATIN 40 MG TABLET] 90 tablet 2     Sig: TAKE 1 TABLET BY MOUTH EVERY DAY    lisinopril (PRINIVIL;ZESTRIL) 10 MG tablet [Pharmacy Med Name: LISINOPRIL 10 MG TABLET] 90 tablet 1     Sig: TAKE 1 TABLET BY MOUTH EVERY DAY        Listed PCP is Yamil Berumen DO   Last provider to prescribe medication: Yamil Berumen DO  Last Date of Medication Prescribed: Atorvastatin (9/2/22 90 Tablets x 3 refills)  Lisinopril (5/23/23 90 tablets x1 refill)  Date of Last Office Visit at [facility]: 06/20/2023   FUTURE APPOINTMENT: No future appointments. Please send refill to:    [facility]/pharmacy #[id number]- [place] [facility] [phone] [address] [facility] [address]  Phone: [phone] Fax: [phone]      Please review request and approve or deny with recommendations.

## 2023-12-09 DIAGNOSIS — I10 ESSENTIAL (PRIMARY) HYPERTENSION: ICD-10-CM

## 2023-12-11 NOTE — TELEPHONE ENCOUNTER
Medication Refill Request    Lawrence Brady is requesting a refill of the following medication(s):   Requested Prescriptions     Pending Prescriptions Disp Refills    amLODIPine (NORVASC) 10 MG tablet [Pharmacy Med Name: AMLODIPINE BESYLATE 10 MG TAB] 90 tablet 1     Sig: TAKE 1 TABLET BY MOUTH EVERY DAY        Listed PCP is Nannette Franklin DO   Last provider to prescribe medication: Nannette Franklin DO   Last Date of Medication Prescribed: 06/20/2023 90 tabs x 1 refill   Date of Last Office Visit at [facility]: 06/20/2023   FUTURE APPOINTMENT: No future appointments. Please send refill to:    [facility]/pharmacy #[id number]- [place] [place] [address] [phone] [address] [facility] [address]  Phone: [phone] Fax: [phone]      Please review request and approve or deny with recommendations.

## 2023-12-13 DIAGNOSIS — I10 ESSENTIAL (PRIMARY) HYPERTENSION: ICD-10-CM

## 2023-12-13 RX ORDER — AMLODIPINE BESYLATE 10 MG/1
10 TABLET ORAL DAILY
Qty: 90 TABLET | Refills: 1 | OUTPATIENT
Start: 2023-12-13

## 2023-12-15 RX ORDER — AMLODIPINE BESYLATE 10 MG/1
10 TABLET ORAL DAILY
Qty: 90 TABLET | Refills: 3 | Status: SHIPPED | OUTPATIENT
Start: 2023-12-15

## 2024-04-24 DIAGNOSIS — I10 ESSENTIAL (PRIMARY) HYPERTENSION: ICD-10-CM

## 2024-04-24 RX ORDER — GLYBURIDE-METFORMIN HYDROCHLORIDE 5; 500 MG/1; MG/1
TABLET ORAL
Qty: 270 TABLET | Refills: 1 | Status: SHIPPED | OUTPATIENT
Start: 2024-04-24

## 2024-04-24 RX ORDER — HYDROCHLOROTHIAZIDE 12.5 MG/1
12.5 TABLET ORAL EVERY MORNING
Qty: 90 TABLET | Refills: 0 | Status: SHIPPED | OUTPATIENT
Start: 2024-04-24

## 2024-04-24 NOTE — TELEPHONE ENCOUNTER
Medication Refill Request    Ricardo Pat is requesting a refill of the following medication(s):   Requested Prescriptions     Pending Prescriptions Disp Refills    hydroCHLOROthiazide 12.5 MG tablet [Pharmacy Med Name: HYDROCHLOROTHIAZIDE 12.5 MG TB] 90 tablet 1     Sig: TAKE 1 TABLET BY MOUTH EVERY DAY IN THE MORNING    glyBURIDE-metFORMIN (GLUCOVANCE) 5-500 MG per tablet [Pharmacy Med Name: GLYBURIDE-METFORMIN 5-500 MG] 270 tablet 1     Sig: TAKE 2 TABLETS BY MOUTH DAILY (WITH BREAKFAST) AND 1 TABLET DAILY (WITH DINNER).        Listed PCP is Griselda Layne DO   Last provider to prescribe medication: Griselda Layne DO   Last Date of Medication Prescribed: hydroCHLOROthiazide : 07/08/2023,  glyBURIDE-metFORMIN : 10/30/2023  Date of Last Office Visit at [facility]: 06/20/2023   FUTURE APPOINTMENT: No future appointments.    Please send refill to:    [facility]/pharmacy #[id number] - [place] - [address] [facility] - P [phone] - F [phone]  [address] [place] [address]  Phone: [phone] Fax: [phone]      Please review request and approve or deny with recommendations.

## 2024-04-30 ENCOUNTER — TELEPHONE (OUTPATIENT)
Age: 69
End: 2024-04-30

## 2024-04-30 NOTE — TELEPHONE ENCOUNTER
Attempted to reach patient to schedule appointment. Left voicemail for patient to call back to schedule.

## 2024-04-30 NOTE — TELEPHONE ENCOUNTER
----- Message from Griselda Layne DO sent at 4/24/2024  9:48 AM EDT -----  Regarding: F/u Appt  Thang Russell!     Please schedule this patient a follow up appt for HTN. I have sent refills in for HCTZ and his diabetes meds, but they will require an appt for further refills.     Thank you!   Dr. Layne

## 2024-05-28 RX ORDER — LISINOPRIL 10 MG/1
10 TABLET ORAL DAILY
Qty: 90 TABLET | Refills: 0 | Status: SHIPPED | OUTPATIENT
Start: 2024-05-28

## 2024-06-14 ENCOUNTER — OFFICE VISIT (OUTPATIENT)
Age: 69
End: 2024-06-14
Payer: MEDICARE

## 2024-06-14 VITALS
OXYGEN SATURATION: 96 % | WEIGHT: 246.6 LBS | SYSTOLIC BLOOD PRESSURE: 131 MMHG | DIASTOLIC BLOOD PRESSURE: 78 MMHG | RESPIRATION RATE: 14 BRPM | TEMPERATURE: 97.7 F | HEIGHT: 72 IN | HEART RATE: 65 BPM | BODY MASS INDEX: 33.4 KG/M2

## 2024-06-14 DIAGNOSIS — B35.1 FUNGAL TOENAIL INFECTION: ICD-10-CM

## 2024-06-14 DIAGNOSIS — E11.22 TYPE 2 DIABETES MELLITUS WITH STAGE 3A CHRONIC KIDNEY DISEASE, WITHOUT LONG-TERM CURRENT USE OF INSULIN (HCC): Primary | ICD-10-CM

## 2024-06-14 DIAGNOSIS — N18.31 TYPE 2 DIABETES MELLITUS WITH STAGE 3A CHRONIC KIDNEY DISEASE, WITHOUT LONG-TERM CURRENT USE OF INSULIN (HCC): Primary | ICD-10-CM

## 2024-06-14 DIAGNOSIS — S76.011A STRAIN OF RIGHT PSOAS MUSCLE, INITIAL ENCOUNTER: ICD-10-CM

## 2024-06-14 DIAGNOSIS — L84 CALLUS BETWEEN TOES: ICD-10-CM

## 2024-06-14 LAB
CREAT UR-MCNC: 128 MG/DL
MICROALBUMIN UR-MCNC: 0.87 MG/DL
MICROALBUMIN/CREAT UR-RTO: 7 MG/G (ref 0–30)

## 2024-06-14 PROCEDURE — 99214 OFFICE O/P EST MOD 30 MIN: CPT | Performed by: STUDENT IN AN ORGANIZED HEALTH CARE EDUCATION/TRAINING PROGRAM

## 2024-06-14 ASSESSMENT — PATIENT HEALTH QUESTIONNAIRE - PHQ9
SUM OF ALL RESPONSES TO PHQ QUESTIONS 1-9: 0
SUM OF ALL RESPONSES TO PHQ QUESTIONS 1-9: 0
SUM OF ALL RESPONSES TO PHQ9 QUESTIONS 1 & 2: 0
2. FEELING DOWN, DEPRESSED OR HOPELESS: NOT AT ALL
SUM OF ALL RESPONSES TO PHQ QUESTIONS 1-9: 0
SUM OF ALL RESPONSES TO PHQ QUESTIONS 1-9: 0
1. LITTLE INTEREST OR PLEASURE IN DOING THINGS: NOT AT ALL

## 2024-06-14 NOTE — PROGRESS NOTES
Ricardo Pat is a 68 y.o. male    Chief Complaint   Patient presents with    Follow-up Chronic Condition       \"Have you been to the ER, urgent care clinic since your last visit?  Hospitalized since your last visit?\"    NO    “Have you seen or consulted any other health care providers outside of [facility] since your last visit?”    NO    “Have you had a colorectal cancer screening such as a colonoscopy/FIT/Cologuard?    NO    No colonoscopy on file  No cologuard on file  No FIT/FOBT on file   No flexible sigmoidoscopy on file               There were no vitals filed for this visit.       No data to display              Health Maintenance Due   Topic Date Due    COVID-19 Vaccine (1) Never done    Diabetic retinal exam  Never done    Colorectal Cancer Screen  Never done    Respiratory Syncytial Virus (RSV) Pregnant or age 60 yrs+ (1 - 1-dose 60+ series) Never done    Pneumococcal 65+ years Vaccine (2 of 2 - PCV) 05/10/2019    AAA screen  Never done    Shingles vaccine (2 of 2) 01/08/2020    Diabetic foot exam  09/02/2023    Annual Wellness Visit (Medicare Advantage)  Never done    A1C test (Diabetic or Prediabetic)  05/01/2024    Diabetic Alb to Cr ratio (uACR) test  05/01/2024    Lipids  05/01/2024    Depression Screen  05/04/2024     
PRAPARE - Transportation     Lack of Transportation (Medical): Not on file     Lack of Transportation (Non-Medical): No   Physical Activity: Not on file   Stress: Not on file   Social Connections: Not on file   Intimate Partner Violence: Not on file   Depression: Not at risk (6/14/2024)    PHQ-2     PHQ-2 Score: 0   Housing Stability: Unknown (6/20/2023)    Housing Stability Vital Sign     Unable to Pay for Housing in the Last Year: Not on file     Number of Places Lived in the Last Year: Not on file     Unstable Housing in the Last Year: No   Interpersonal Safety: Not on file   Utilities: Not on file        Medications:   Current Outpatient Medications   Medication Sig    lisinopril (PRINIVIL;ZESTRIL) 10 MG tablet Take 1 tablet by mouth daily NEEDS APPT FOR FURTHER REFILLS    hydroCHLOROthiazide 12.5 MG tablet Take 1 tablet by mouth every morning NEEDS APPT FOR FURTHER REFILLS    glyBURIDE-metFORMIN (GLUCOVANCE) 5-500 MG per tablet TAKE 2 TABLETS BY MOUTH DAILY (WITH BREAKFAST) AND 1 TABLET DAILY (WITH DINNER).    amLODIPine (NORVASC) 10 MG tablet TAKE 1 TABLET BY MOUTH EVERY DAY    atorvastatin (LIPITOR) 40 MG tablet TAKE 1 TABLET BY MOUTH EVERY DAY    BABY ASPIRIN PO Take 81 mg by mouth daily    vitamin D 25 MCG (1000 UT) CAPS Take by mouth 2 times daily     No current facility-administered medications for this visit.        Allergies:  No Known Allergies     Health Maintenance:  Health Maintenance Due   Topic Date Due    COVID-19 Vaccine (1) Never done    Diabetic retinal exam  Never done    Colorectal Cancer Screen  Never done    Respiratory Syncytial Virus (RSV) Pregnant or age 60 yrs+ (1 - 1-dose 60+ series) Never done    Pneumococcal 65+ years Vaccine (2 of 2 - PCV) 05/10/2019    AAA screen  Never done    Shingles vaccine (2 of 2) 01/08/2020    Diabetic foot exam  09/02/2023    Annual Wellness Visit (Medicare Advantage)  Never done      Colonoscopy: 2018, thinks he is on 10 year schedule but unsure, will ask

## 2024-06-15 LAB
ANION GAP SERPL CALC-SCNC: 6 MMOL/L (ref 5–15)
BUN SERPL-MCNC: 17 MG/DL (ref 6–20)
BUN/CREAT SERPL: 13 (ref 12–20)
CALCIUM SERPL-MCNC: 9.1 MG/DL (ref 8.5–10.1)
CHLORIDE SERPL-SCNC: 103 MMOL/L (ref 97–108)
CHOLEST SERPL-MCNC: 142 MG/DL
CO2 SERPL-SCNC: 28 MMOL/L (ref 21–32)
CREAT SERPL-MCNC: 1.27 MG/DL (ref 0.7–1.3)
EST. AVERAGE GLUCOSE BLD GHB EST-MCNC: 151 MG/DL
GLUCOSE SERPL-MCNC: 161 MG/DL (ref 65–100)
HBA1C MFR BLD: 6.9 % (ref 4–5.6)
HDLC SERPL-MCNC: 49 MG/DL
HDLC SERPL: 2.9 (ref 0–5)
LDLC SERPL CALC-MCNC: 68.4 MG/DL (ref 0–100)
POTASSIUM SERPL-SCNC: 4.4 MMOL/L (ref 3.5–5.1)
SODIUM SERPL-SCNC: 137 MMOL/L (ref 136–145)
TRIGL SERPL-MCNC: 123 MG/DL
VLDLC SERPL CALC-MCNC: 24.6 MG/DL

## 2024-08-19 DIAGNOSIS — I10 ESSENTIAL (PRIMARY) HYPERTENSION: ICD-10-CM

## 2024-08-19 NOTE — TELEPHONE ENCOUNTER
Medication Refill Request    Ricardo Pat is requesting a refill of the following medication(s):   Requested Prescriptions     Pending Prescriptions Disp Refills    hydroCHLOROthiazide 12.5 MG tablet 90 tablet 2     Sig: Take 1 tablet by mouth every morning NEEDS APPT FOR FURTHER REFILLS    lisinopril (PRINIVIL;ZESTRIL) 10 MG tablet 90 tablet 2     Sig: Take 1 tablet by mouth daily NEEDS APPT FOR FURTHER REFILLS    atorvastatin (LIPITOR) 40 MG tablet 90 tablet 2     Sig: Take 1 tablet by mouth daily        Listed PCP is Agustina Morocho MD   Last provider to prescribe medication: Dr Layne  Last Date of Medication Prescribed:  05/28/2024  Date of Last Office Visit at [facility]: 06/14/2024   FUTURE APPOINTMENT: No future appointments.    Please send refill to:    [facility]/pharmacy #[id number] - [place] - [address] [facility] - P [phone] - F [phone] [address] [facility] [address]  Phone: [phone] Fax: [phone]      Please review request and approve or deny with recommendations.

## 2024-08-19 NOTE — TELEPHONE ENCOUNTER
Patient is asking for a refill of his medication:    atorvastatin (LIPITOR) 40 MG tablet   lisinopril (PRINIVIL;ZESTRIL) 10 MG tablet   hydroCHLOROthiazide 12.5 MG tablet     Please send to:    Pharmacy    [facility]/pharmacy #[id number] - [place] - [address] - P [phone] - F [phone] [address] [facility] [place] [address]  Phone: [phone]  Fax: [phone]

## 2024-08-20 RX ORDER — ATORVASTATIN CALCIUM 40 MG/1
40 TABLET, FILM COATED ORAL DAILY
Qty: 90 TABLET | Refills: 2 | Status: SHIPPED | OUTPATIENT
Start: 2024-08-20

## 2024-08-20 RX ORDER — LISINOPRIL 10 MG/1
10 TABLET ORAL DAILY
Qty: 90 TABLET | Refills: 2 | Status: SHIPPED | OUTPATIENT
Start: 2024-08-20

## 2024-08-20 RX ORDER — HYDROCHLOROTHIAZIDE 12.5 MG/1
12.5 TABLET ORAL EVERY MORNING
Qty: 90 TABLET | Refills: 2 | Status: SHIPPED | OUTPATIENT
Start: 2024-08-20

## 2024-09-18 ENCOUNTER — TELEPHONE (OUTPATIENT)
Age: 69
End: 2024-09-18

## 2024-09-18 PROBLEM — D69.6 THROMBOCYTOPENIA, UNSPECIFIED (HCC): Status: ACTIVE | Noted: 2024-09-18

## 2024-09-18 SDOH — ECONOMIC STABILITY: FOOD INSECURITY: WITHIN THE PAST 12 MONTHS, YOU WORRIED THAT YOUR FOOD WOULD RUN OUT BEFORE YOU GOT MONEY TO BUY MORE.: NEVER TRUE

## 2024-09-18 SDOH — ECONOMIC STABILITY: TRANSPORTATION INSECURITY
IN THE PAST 12 MONTHS, HAS LACK OF TRANSPORTATION KEPT YOU FROM MEETINGS, WORK, OR FROM GETTING THINGS NEEDED FOR DAILY LIVING?: NO

## 2024-09-18 SDOH — ECONOMIC STABILITY: FOOD INSECURITY: WITHIN THE PAST 12 MONTHS, THE FOOD YOU BOUGHT JUST DIDN'T LAST AND YOU DIDN'T HAVE MONEY TO GET MORE.: NEVER TRUE

## 2024-09-18 SDOH — ECONOMIC STABILITY: INCOME INSECURITY: HOW HARD IS IT FOR YOU TO PAY FOR THE VERY BASICS LIKE FOOD, HOUSING, MEDICAL CARE, AND HEATING?: NOT HARD AT ALL

## 2024-09-19 ENCOUNTER — OFFICE VISIT (OUTPATIENT)
Age: 69
End: 2024-09-19
Payer: MEDICARE

## 2024-09-19 VITALS
OXYGEN SATURATION: 94 % | HEART RATE: 79 BPM | DIASTOLIC BLOOD PRESSURE: 66 MMHG | BODY MASS INDEX: 33.36 KG/M2 | TEMPERATURE: 98 F | SYSTOLIC BLOOD PRESSURE: 124 MMHG | WEIGHT: 246 LBS

## 2024-09-19 DIAGNOSIS — G47.33 OSA (OBSTRUCTIVE SLEEP APNEA): Primary | ICD-10-CM

## 2024-09-19 PROCEDURE — 99213 OFFICE O/P EST LOW 20 MIN: CPT

## 2024-09-27 ENCOUNTER — TELEPHONE (OUTPATIENT)
Age: 69
End: 2024-09-27

## 2024-09-27 DIAGNOSIS — G47.33 OSA (OBSTRUCTIVE SLEEP APNEA): Primary | ICD-10-CM

## 2024-10-10 ENCOUNTER — TELEPHONE (OUTPATIENT)
Age: 69
End: 2024-10-10

## 2024-10-10 NOTE — TELEPHONE ENCOUNTER
Sleep study that was provided by patient and pt last OV 09/19/2024 was faxed to [facility] [phone]  Confirmation received

## 2024-11-04 RX ORDER — GLYBURIDE-METFORMIN HYDROCHLORIDE 5; 500 MG/1; MG/1
TABLET ORAL
Qty: 270 TABLET | Refills: 1 | Status: SHIPPED | OUTPATIENT
Start: 2024-11-04

## 2024-11-04 NOTE — TELEPHONE ENCOUNTER
Pt is requesting refills of glyBURIDE-metFORMIN (GLUCOVANCE) 5-500 MG per tablet  to be sent to [facility]/pharmacy #[id number] - [place], [place] - [address] - P [phone] - F [phone] .    Pt is aware that he is due for an appt. He stated that he will call back to schedule after he look he has access to his schedule.    Thank you

## 2024-12-08 ENCOUNTER — APPOINTMENT (OUTPATIENT)
Dept: VASCULAR SURGERY | Facility: HOSPITAL | Age: 69
DRG: 066 | End: 2024-12-08
Attending: HOSPITALIST
Payer: MEDICARE

## 2024-12-08 ENCOUNTER — APPOINTMENT (OUTPATIENT)
Facility: HOSPITAL | Age: 69
DRG: 066 | End: 2024-12-08
Payer: MEDICARE

## 2024-12-08 ENCOUNTER — HOSPITAL ENCOUNTER (INPATIENT)
Facility: HOSPITAL | Age: 69
LOS: 1 days | Discharge: HOME | DRG: 066 | End: 2024-12-09
Attending: STUDENT IN AN ORGANIZED HEALTH CARE EDUCATION/TRAINING PROGRAM | Admitting: HOSPITALIST
Payer: MEDICARE

## 2024-12-08 ENCOUNTER — HOSPITAL ENCOUNTER (EMERGENCY)
Facility: HOSPITAL | Age: 69
Discharge: HOME | DRG: 066 | End: 2024-12-11
Payer: MEDICARE

## 2024-12-08 DIAGNOSIS — H53.2 DIPLOPIA: ICD-10-CM

## 2024-12-08 DIAGNOSIS — I63.9 CEREBROVASCULAR ACCIDENT (CVA), UNSPECIFIED MECHANISM (HCC): Primary | ICD-10-CM

## 2024-12-08 LAB
ALBUMIN SERPL-MCNC: 3.8 G/DL (ref 3.5–5)
ALBUMIN/GLOB SERPL: 1.2 (ref 1.1–2.2)
ALP SERPL-CCNC: 73 U/L (ref 45–117)
ALT SERPL-CCNC: 67 U/L (ref 12–78)
ANION GAP SERPL CALC-SCNC: 7 MMOL/L (ref 2–12)
AST SERPL-CCNC: 46 U/L (ref 15–37)
BASOPHILS # BLD: 0.1 K/UL (ref 0–0.1)
BASOPHILS NFR BLD: 1 % (ref 0–1)
BILIRUB SERPL-MCNC: 1 MG/DL (ref 0.2–1)
BUN SERPL-MCNC: 23 MG/DL (ref 6–20)
BUN/CREAT SERPL: 16 (ref 12–20)
CALCIUM SERPL-MCNC: 9 MG/DL (ref 8.5–10.1)
CHLORIDE SERPL-SCNC: 106 MMOL/L (ref 97–108)
CO2 SERPL-SCNC: 25 MMOL/L (ref 21–32)
COMMENT:: NORMAL
CREAT SERPL-MCNC: 1.47 MG/DL (ref 0.7–1.3)
DIFFERENTIAL METHOD BLD: ABNORMAL
EOSINOPHIL # BLD: 0.2 K/UL (ref 0–0.4)
EOSINOPHIL NFR BLD: 4 % (ref 0–7)
ERYTHROCYTE [DISTWIDTH] IN BLOOD BY AUTOMATED COUNT: 13.1 % (ref 11.5–14.5)
GLOBULIN SER CALC-MCNC: 3.3 G/DL (ref 2–4)
GLUCOSE BLD STRIP.AUTO-MCNC: 133 MG/DL (ref 65–117)
GLUCOSE BLD STRIP.AUTO-MCNC: 177 MG/DL (ref 65–117)
GLUCOSE SERPL-MCNC: 178 MG/DL (ref 65–100)
HCT VFR BLD AUTO: 42.2 % (ref 36.6–50.3)
HGB BLD-MCNC: 14.1 G/DL (ref 12.1–17)
IMM GRANULOCYTES # BLD AUTO: 0.1 K/UL (ref 0–0.04)
IMM GRANULOCYTES NFR BLD AUTO: 1 % (ref 0–0.5)
LYMPHOCYTES # BLD: 1.4 K/UL (ref 0.8–3.5)
LYMPHOCYTES NFR BLD: 23 % (ref 12–49)
MCH RBC QN AUTO: 29.3 PG (ref 26–34)
MCHC RBC AUTO-ENTMCNC: 33.4 G/DL (ref 30–36.5)
MCV RBC AUTO: 87.7 FL (ref 80–99)
MONOCYTES # BLD: 0.5 K/UL (ref 0–1)
MONOCYTES NFR BLD: 8 % (ref 5–13)
NEUTS SEG # BLD: 3.9 K/UL (ref 1.8–8)
NEUTS SEG NFR BLD: 63 % (ref 32–75)
NRBC # BLD: 0 K/UL (ref 0–0.01)
NRBC BLD-RTO: 0 PER 100 WBC
PLATELET # BLD AUTO: 146 K/UL (ref 150–400)
PMV BLD AUTO: 9.6 FL (ref 8.9–12.9)
POTASSIUM SERPL-SCNC: 4.2 MMOL/L (ref 3.5–5.1)
PROT SERPL-MCNC: 7.1 G/DL (ref 6.4–8.2)
RBC # BLD AUTO: 4.81 M/UL (ref 4.1–5.7)
SERVICE CMNT-IMP: ABNORMAL
SERVICE CMNT-IMP: ABNORMAL
SODIUM SERPL-SCNC: 138 MMOL/L (ref 136–145)
SPECIMEN HOLD: NORMAL
TROPONIN I SERPL HS-MCNC: 4 NG/L (ref 0–76)
WBC # BLD AUTO: 6.1 K/UL (ref 4.1–11.1)

## 2024-12-08 PROCEDURE — 6370000000 HC RX 637 (ALT 250 FOR IP): Performed by: STUDENT IN AN ORGANIZED HEALTH CARE EDUCATION/TRAINING PROGRAM

## 2024-12-08 PROCEDURE — 2580000003 HC RX 258: Performed by: HOSPITALIST

## 2024-12-08 PROCEDURE — 70551 MRI BRAIN STEM W/O DYE: CPT

## 2024-12-08 PROCEDURE — 99285 EMERGENCY DEPT VISIT HI MDM: CPT

## 2024-12-08 PROCEDURE — 6360000002 HC RX W HCPCS: Performed by: HOSPITALIST

## 2024-12-08 PROCEDURE — 2060000000 HC ICU INTERMEDIATE R&B

## 2024-12-08 PROCEDURE — 80053 COMPREHEN METABOLIC PANEL: CPT

## 2024-12-08 PROCEDURE — 93005 ELECTROCARDIOGRAM TRACING: CPT | Performed by: STUDENT IN AN ORGANIZED HEALTH CARE EDUCATION/TRAINING PROGRAM

## 2024-12-08 PROCEDURE — 85025 COMPLETE CBC W/AUTO DIFF WBC: CPT

## 2024-12-08 PROCEDURE — 82962 GLUCOSE BLOOD TEST: CPT

## 2024-12-08 PROCEDURE — 6370000000 HC RX 637 (ALT 250 FOR IP): Performed by: HOSPITALIST

## 2024-12-08 PROCEDURE — 84484 ASSAY OF TROPONIN QUANT: CPT

## 2024-12-08 PROCEDURE — 36415 COLL VENOUS BLD VENIPUNCTURE: CPT

## 2024-12-08 PROCEDURE — 94761 N-INVAS EAR/PLS OXIMETRY MLT: CPT

## 2024-12-08 PROCEDURE — 70450 CT HEAD/BRAIN W/O DYE: CPT

## 2024-12-08 RX ORDER — POLYETHYLENE GLYCOL 3350 17 G/17G
17 POWDER, FOR SOLUTION ORAL DAILY PRN
Status: DISCONTINUED | OUTPATIENT
Start: 2024-12-08 | End: 2024-12-09 | Stop reason: HOSPADM

## 2024-12-08 RX ORDER — ENOXAPARIN SODIUM 100 MG/ML
30 INJECTION SUBCUTANEOUS 2 TIMES DAILY
Status: DISCONTINUED | OUTPATIENT
Start: 2024-12-08 | End: 2024-12-09

## 2024-12-08 RX ORDER — SODIUM CHLORIDE 9 MG/ML
INJECTION, SOLUTION INTRAVENOUS PRN
Status: DISCONTINUED | OUTPATIENT
Start: 2024-12-08 | End: 2024-12-09 | Stop reason: HOSPADM

## 2024-12-08 RX ORDER — ASPIRIN 81 MG/1
81 TABLET, CHEWABLE ORAL DAILY
Status: DISCONTINUED | OUTPATIENT
Start: 2024-12-09 | End: 2024-12-09

## 2024-12-08 RX ORDER — ASPIRIN 300 MG/1
300 SUPPOSITORY RECTAL DAILY
Status: DISCONTINUED | OUTPATIENT
Start: 2024-12-09 | End: 2024-12-09

## 2024-12-08 RX ORDER — SODIUM CHLORIDE 0.9 % (FLUSH) 0.9 %
5-40 SYRINGE (ML) INJECTION EVERY 12 HOURS SCHEDULED
Status: DISCONTINUED | OUTPATIENT
Start: 2024-12-08 | End: 2024-12-09 | Stop reason: HOSPADM

## 2024-12-08 RX ORDER — ONDANSETRON 2 MG/ML
4 INJECTION INTRAMUSCULAR; INTRAVENOUS EVERY 6 HOURS PRN
Status: DISCONTINUED | OUTPATIENT
Start: 2024-12-08 | End: 2024-12-09 | Stop reason: HOSPADM

## 2024-12-08 RX ORDER — ATORVASTATIN CALCIUM 20 MG/1
80 TABLET, FILM COATED ORAL NIGHTLY
Status: DISCONTINUED | OUTPATIENT
Start: 2024-12-08 | End: 2024-12-08

## 2024-12-08 RX ORDER — INSULIN LISPRO 100 [IU]/ML
0-4 INJECTION, SOLUTION INTRAVENOUS; SUBCUTANEOUS
Status: DISCONTINUED | OUTPATIENT
Start: 2024-12-08 | End: 2024-12-09 | Stop reason: HOSPADM

## 2024-12-08 RX ORDER — SODIUM CHLORIDE 0.9 % (FLUSH) 0.9 %
5-40 SYRINGE (ML) INJECTION PRN
Status: DISCONTINUED | OUTPATIENT
Start: 2024-12-08 | End: 2024-12-09 | Stop reason: HOSPADM

## 2024-12-08 RX ORDER — DEXTROSE MONOHYDRATE 100 MG/ML
INJECTION, SOLUTION INTRAVENOUS CONTINUOUS PRN
Status: DISCONTINUED | OUTPATIENT
Start: 2024-12-08 | End: 2024-12-09 | Stop reason: HOSPADM

## 2024-12-08 RX ORDER — ONDANSETRON 4 MG/1
4 TABLET, ORALLY DISINTEGRATING ORAL EVERY 8 HOURS PRN
Status: DISCONTINUED | OUTPATIENT
Start: 2024-12-08 | End: 2024-12-09 | Stop reason: HOSPADM

## 2024-12-08 RX ORDER — ASPIRIN 81 MG/1
81 TABLET, CHEWABLE ORAL DAILY
Status: DISCONTINUED | OUTPATIENT
Start: 2024-12-08 | End: 2024-12-08

## 2024-12-08 RX ORDER — ATORVASTATIN CALCIUM 20 MG/1
40 TABLET, FILM COATED ORAL DAILY
Status: DISCONTINUED | OUTPATIENT
Start: 2024-12-08 | End: 2024-12-09

## 2024-12-08 RX ADMIN — SODIUM CHLORIDE, PRESERVATIVE FREE 10 ML: 5 INJECTION INTRAVENOUS at 21:30

## 2024-12-08 RX ADMIN — ATORVASTATIN CALCIUM 40 MG: 20 TABLET, FILM COATED ORAL at 14:50

## 2024-12-08 RX ADMIN — ASPIRIN 81 MG: 81 TABLET, CHEWABLE ORAL at 13:41

## 2024-12-08 RX ADMIN — ENOXAPARIN SODIUM 30 MG: 100 INJECTION SUBCUTANEOUS at 18:09

## 2024-12-08 ASSESSMENT — PAIN DESCRIPTION - ORIENTATION: ORIENTATION: POSTERIOR

## 2024-12-08 ASSESSMENT — PAIN SCALES - GENERAL: PAINLEVEL_OUTOF10: 2

## 2024-12-08 ASSESSMENT — PAIN DESCRIPTION - DESCRIPTORS: DESCRIPTORS: ACHING

## 2024-12-08 ASSESSMENT — PAIN DESCRIPTION - LOCATION: LOCATION: HEAD

## 2024-12-08 NOTE — ED TRIAGE NOTES
Patient arrives to the ED for complaints of intermittent double vision and posterior head tension which started ~12 pm yesterday. States it has been constant.     Denies numbness, tingling, weakness, dizziness.     Dr. Lopez in room assessing patient.     Reports he has an appointment with the eye doctor tomorrow for potential cataract surgery.    PMH Diabetes, HTN

## 2024-12-08 NOTE — H&P
Hospitalist Admission Note      NAME:  Ricardo Pat   :  1955   MRN:  [id number]     Date/Time:  2024 2:18 PM    Patient PCP: Agustina Morocho MD    ________________________________________________________________________    Given the patient's current clinical presentation, I have a high level of concern for decompensation if discharged from the emergency department.  Complex decision making was performed, which includes reviewing the patient's available past medical records, laboratory results, and x-ray films.       My assessment of this patient's clinical condition and my plan of care is as follows.    Assessment / Plan:  Patient is a 69-year-old male with a history of hypertension, hyperlipidemia, diabetes comes to the hospital with acute CVA.  Patient admitted for stroke workup and neurology evaluation.    1.  CVA  Symptoms started yesterday around 12 PM.  CT head is negative for any acute findings  MRI brain shows scattered punctate foci of acute infarction at the left frontal corona radiator and at the left parietal lobe.  Echocardiogram, ultrasound of carotids.  Hemoglobin A1c and LDL.  Neurology consult.    2.  Hypertension  Patient is taking lisinopril    3.  Diabetes  Hold metformin.  Start sliding scale insulin and check hemoglobin A1c.    4.  Hyperlipidemia  Continue statins.  Check lipid panel.    5.  Obstructive sleep apnea  Patient is on CPAP at home but his CPAP machine is broken.  He needs a repeat sleep study and he has an appointment in 2025..         I have personally reviewed the radiographs, laboratory data in Epic and decisions and statements above are based partially on this personal interpretation.    Code Status: Full Code  DVT Prophylaxis: Lovenox  GI Prophylaxis: not indicated    Plan of care discussed with the patient, his wife and his son at bedside.     Subjective:   CHIEF COMPLAINT: \"Double vision\"    HISTORY OF PRESENT ILLNESS:     Ricardo is a 69 y.o.

## 2024-12-08 NOTE — ED PROVIDER NOTES
[facility] EMERGENCY DEPT  EMERGENCY DEPARTMENT ENCOUNTER      Pt Name: Ricardo Pat  MRN: [id number]  Birthdate 1955  Date of evaluation: 12/8/2024  Provider: Rosemarie Lopez MD    CHIEF COMPLAINT       Chief Complaint   Patient presents with    Headache     HISTORY OF PRESENT ILLNESS   (Location/Symptom, Timing/Onset, Context/Setting, Quality, Duration, Modifying Factors, Severity)  Note limiting factors.   HPI  69-year-old male with past medical history of type 2 diabetes, hypertension, hypercholesterolemia, cataracts, presents to the ER for evaluation of double vision and posterior head \"tension\" since yesterday.  Patient reports he has had a mild headache for the past few days, but denies any active headache at this time.  Denies posterior head tension being painful in nature currently.  Double vision began yesterday around 12 PM.  Reports only experiencing binocular double vision, and when he closes 1 eye, he is able to see normally with the other. States initially attributing his symptoms to fatigue as he has not been sleeping as much over the past week.  However, due to persistence of double vision, decided to come to the ER for further assessment.  Patient denies any other concerning neurologic symptoms such as speech changes, facial droop, weakness, numbness, tingling, dizziness, gait disturbances, etc.     Review of External Medical Records:     Nursing Notes were reviewed.    REVIEW OF SYSTEMS    (2-9 systems for level 4, 10 or more for level 5)     Review of Systems   All other systems reviewed and are negative.      Except as noted above the remainder of the review of systems was reviewed and negative.       PAST MEDICAL HISTORY     Past Medical History:   Diagnosis Date    Hypercholesterolemia     Hypertension     Nonspecific abnormal electrocardiogram (ECG) (EKG) 3/17/2011    Other dyspnea and respiratory abnormality 3/17/2011    Pre-operative cardiovascular examination 3/17/2011    Type II or

## 2024-12-08 NOTE — ED NOTES
TRANSFER - OUT REPORT:    Verbal report given to Yamel on Ricardo Pat  being transferred to [facility] for routine progression of patient care       Report consisted of patient's Situation, Background, Assessment and   Recommendations(SBAR).     Information from the following report(s) ED Encounter Summary and Recent Results was reviewed with the receiving nurse.    Akira Fall Assessment:    Presents to emergency department  because of falls (Syncope, seizure, or loss of consciousness): No  Age > 70: No  Altered Mental Status, Intoxication with alcohol or substance confusion (Disorientation, impaired judgment, poor safety awaremess, or inability to follow instructions): No  Impaired Mobility: Ambulates or transfers with assistive devices or assistance; Unable to ambulate or transer.: No  Nursing Judgement: No          Lines:   Peripheral IV 12/08/24 Left Antecubital (Active)   Site Assessment Clean, dry & intact 12/08/24 0900   Line Status Specimen collected 12/08/24 0900   Phlebitis Assessment No symptoms 12/08/24 0900   Infiltration Assessment 0 12/08/24 0900   Dressing Status Clean, dry & intact 12/08/24 0900   Dressing Type Transparent 12/08/24 0900        Opportunity for questions and clarification was provided.      Patient transported with:  Tech

## 2024-12-09 ENCOUNTER — APPOINTMENT (OUTPATIENT)
Facility: HOSPITAL | Age: 69
DRG: 066 | End: 2024-12-09
Attending: SPECIALIST
Payer: MEDICARE

## 2024-12-09 ENCOUNTER — APPOINTMENT (OUTPATIENT)
Dept: VASCULAR SURGERY | Facility: HOSPITAL | Age: 69
DRG: 066 | End: 2024-12-09
Attending: HOSPITALIST
Payer: MEDICARE

## 2024-12-09 ENCOUNTER — APPOINTMENT (OUTPATIENT)
Facility: HOSPITAL | Age: 69
DRG: 066 | End: 2024-12-09
Payer: MEDICARE

## 2024-12-09 ENCOUNTER — APPOINTMENT (OUTPATIENT)
Facility: HOSPITAL | Age: 69
DRG: 066 | End: 2024-12-09
Attending: HOSPITALIST
Payer: MEDICARE

## 2024-12-09 VITALS
OXYGEN SATURATION: 95 % | DIASTOLIC BLOOD PRESSURE: 69 MMHG | WEIGHT: 240 LBS | TEMPERATURE: 97.9 F | RESPIRATION RATE: 18 BRPM | HEIGHT: 72 IN | HEART RATE: 67 BPM | SYSTOLIC BLOOD PRESSURE: 121 MMHG | BODY MASS INDEX: 32.51 KG/M2

## 2024-12-09 PROBLEM — H53.2 DIPLOPIA: Status: ACTIVE | Noted: 2024-12-09

## 2024-12-09 LAB
ANION GAP SERPL CALC-SCNC: 9 MMOL/L (ref 2–12)
BUN SERPL-MCNC: 18 MG/DL (ref 6–20)
BUN/CREAT SERPL: 16 (ref 12–20)
CALCIUM SERPL-MCNC: 9.3 MG/DL (ref 8.5–10.1)
CHLORIDE SERPL-SCNC: 104 MMOL/L (ref 97–108)
CHOLEST SERPL-MCNC: 156 MG/DL
CO2 SERPL-SCNC: 23 MMOL/L (ref 21–32)
CREAT SERPL-MCNC: 1.11 MG/DL (ref 0.7–1.3)
ECHO AO ARCH DIAM: 3.1 CM
ECHO AO ASC DIAM: 3.7 CM
ECHO AO ASCENDING AORTA INDEX: 1.61 CM/M2
ECHO AO ROOT DIAM: 3.9 CM
ECHO AO ROOT INDEX: 1.7 CM/M2
ECHO AV AREA PEAK VELOCITY: 2.8 CM2
ECHO AV AREA VTI: 3.1 CM2
ECHO AV AREA/BSA PEAK VELOCITY: 1.2 CM2/M2
ECHO AV AREA/BSA VTI: 1.3 CM2/M2
ECHO AV MEAN GRADIENT: 7 MMHG
ECHO AV MEAN VELOCITY: 1.2 M/S
ECHO AV PEAK GRADIENT: 13 MMHG
ECHO AV PEAK VELOCITY: 1.8 M/S
ECHO AV VELOCITY RATIO: 0.61
ECHO AV VTI: 36.4 CM
ECHO BSA: 2.35 M2
ECHO BSA: 2.35 M2
ECHO LA VOL A-L A2C: 54 ML (ref 18–58)
ECHO LA VOL A-L A4C: 44 ML (ref 18–58)
ECHO LA VOL BP: 44 ML (ref 18–58)
ECHO LA VOL MOD A2C: 52 ML (ref 18–58)
ECHO LA VOL MOD A4C: 40 ML (ref 18–58)
ECHO LA VOL/BSA BIPLANE: 19 ML/M2 (ref 16–34)
ECHO LA VOLUME AREA LENGTH: 49 ML
ECHO LA VOLUME INDEX A-L A2C: 23 ML/M2 (ref 16–34)
ECHO LA VOLUME INDEX A-L A4C: 19 ML/M2 (ref 16–34)
ECHO LA VOLUME INDEX AREA LENGTH: 21 ML/M2 (ref 16–34)
ECHO LA VOLUME INDEX MOD A2C: 23 ML/M2 (ref 16–34)
ECHO LA VOLUME INDEX MOD A4C: 17 ML/M2 (ref 16–34)
ECHO LV E' LATERAL VELOCITY: 10.54 CM/S
ECHO LV E' SEPTAL VELOCITY: 4.08 CM/S
ECHO LV EDV A2C: 78 ML
ECHO LV EDV A4C: 95 ML
ECHO LV EDV BP: 87 ML (ref 67–155)
ECHO LV EDV INDEX A4C: 41 ML/M2
ECHO LV EDV INDEX BP: 38 ML/M2
ECHO LV EDV NDEX A2C: 34 ML/M2
ECHO LV EJECTION FRACTION A2C: 48 %
ECHO LV EJECTION FRACTION A4C: 68 %
ECHO LV EJECTION FRACTION BIPLANE: 60 % (ref 55–100)
ECHO LV ESV A2C: 41 ML
ECHO LV ESV A4C: 30 ML
ECHO LV ESV BP: 35 ML (ref 22–58)
ECHO LV ESV INDEX A2C: 18 ML/M2
ECHO LV ESV INDEX A4C: 13 ML/M2
ECHO LV ESV INDEX BP: 15 ML/M2
ECHO LVOT AREA: 4.5 CM2
ECHO LVOT AV VTI INDEX: 0.66
ECHO LVOT DIAM: 2.4 CM
ECHO LVOT MEAN GRADIENT: 3 MMHG
ECHO LVOT PEAK GRADIENT: 5 MMHG
ECHO LVOT PEAK VELOCITY: 1.1 M/S
ECHO LVOT STROKE VOLUME INDEX: 47.6 ML/M2
ECHO LVOT SV: 109.4 ML
ECHO LVOT VTI: 24.2 CM
ECHO MV A VELOCITY: 0.92 M/S
ECHO MV E DECELERATION TIME (DT): 290 MS
ECHO MV E VELOCITY: 0.68 M/S
ECHO MV E/A RATIO: 0.74
ECHO MV E/E' LATERAL: 6.45
ECHO MV E/E' RATIO (AVERAGED): 11.56
ECHO MV E/E' SEPTAL: 16.67
ECHO PV MAX VELOCITY: 1.4 M/S
ECHO PV PEAK GRADIENT: 7 MMHG
ECHO RV FREE WALL PEAK S': 9.6 CM/S
ECHO RV TAPSE: 2.2 CM (ref 1.7–?)
ECHO TV REGURGITANT MAX VELOCITY: 1.6 M/S
ECHO TV REGURGITANT PEAK GRADIENT: 11 MMHG
EKG ATRIAL RATE: 68 BPM
EKG DIAGNOSIS: NORMAL
EKG P AXIS: 44 DEGREES
EKG P-R INTERVAL: 208 MS
EKG Q-T INTERVAL: 420 MS
EKG QRS DURATION: 130 MS
EKG QTC CALCULATION (BAZETT): 446 MS
EKG R AXIS: 26 DEGREES
EKG T AXIS: -4 DEGREES
EKG VENTRICULAR RATE: 68 BPM
ERYTHROCYTE [DISTWIDTH] IN BLOOD BY AUTOMATED COUNT: 13.1 % (ref 11.5–14.5)
EST. AVERAGE GLUCOSE BLD GHB EST-MCNC: 143 MG/DL
GLUCOSE BLD STRIP.AUTO-MCNC: 120 MG/DL (ref 65–117)
GLUCOSE BLD STRIP.AUTO-MCNC: 161 MG/DL (ref 65–117)
GLUCOSE BLD STRIP.AUTO-MCNC: 219 MG/DL (ref 65–117)
GLUCOSE SERPL-MCNC: 159 MG/DL (ref 65–100)
HBA1C MFR BLD: 6.6 % (ref 4–5.6)
HCT VFR BLD AUTO: 44.9 % (ref 36.6–50.3)
HDLC SERPL-MCNC: 44 MG/DL
HDLC SERPL: 3.5 (ref 0–5)
HGB BLD-MCNC: 14.7 G/DL (ref 12.1–17)
LDLC SERPL CALC-MCNC: 83 MG/DL (ref 0–100)
MCH RBC QN AUTO: 29.1 PG (ref 26–34)
MCHC RBC AUTO-ENTMCNC: 32.7 G/DL (ref 30–36.5)
MCV RBC AUTO: 88.7 FL (ref 80–99)
NRBC # BLD: 0 K/UL (ref 0–0.01)
NRBC BLD-RTO: 0 PER 100 WBC
P2Y12 PLT RESPONSE: 228 PRU (ref 194–418)
PLATELET # BLD AUTO: 143 K/UL (ref 150–400)
PMV BLD AUTO: 9 FL (ref 8.9–12.9)
POTASSIUM SERPL-SCNC: 4.3 MMOL/L (ref 3.5–5.1)
RBC # BLD AUTO: 5.06 M/UL (ref 4.1–5.7)
SERVICE CMNT-IMP: ABNORMAL
SODIUM SERPL-SCNC: 136 MMOL/L (ref 136–145)
TRIGL SERPL-MCNC: 145 MG/DL
VLDLC SERPL CALC-MCNC: 29 MG/DL
WBC # BLD AUTO: 7.2 K/UL (ref 4.1–11.1)

## 2024-12-09 PROCEDURE — 6370000000 HC RX 637 (ALT 250 FOR IP)

## 2024-12-09 PROCEDURE — 2580000003 HC RX 258: Performed by: HOSPITALIST

## 2024-12-09 PROCEDURE — 93010 ELECTROCARDIOGRAM REPORT: CPT | Performed by: SPECIALIST

## 2024-12-09 PROCEDURE — 82962 GLUCOSE BLOOD TEST: CPT

## 2024-12-09 PROCEDURE — 6370000000 HC RX 637 (ALT 250 FOR IP): Performed by: NURSE PRACTITIONER

## 2024-12-09 PROCEDURE — 36415 COLL VENOUS BLD VENIPUNCTURE: CPT

## 2024-12-09 PROCEDURE — 93306 TTE W/DOPPLER COMPLETE: CPT

## 2024-12-09 PROCEDURE — 6360000004 HC RX CONTRAST MEDICATION: Performed by: FAMILY MEDICINE

## 2024-12-09 PROCEDURE — 99238 HOSP IP/OBS DSCHRG MGMT 30/<: CPT | Performed by: STUDENT IN AN ORGANIZED HEALTH CARE EDUCATION/TRAINING PROGRAM

## 2024-12-09 PROCEDURE — 97112 NEUROMUSCULAR REEDUCATION: CPT

## 2024-12-09 PROCEDURE — 6360000002 HC RX W HCPCS: Performed by: HOSPITALIST

## 2024-12-09 PROCEDURE — 85576 BLOOD PLATELET AGGREGATION: CPT

## 2024-12-09 PROCEDURE — 80048 BASIC METABOLIC PNL TOTAL CA: CPT

## 2024-12-09 PROCEDURE — 93246 EXT ECG>7D<15D RECORDING: CPT

## 2024-12-09 PROCEDURE — 6370000000 HC RX 637 (ALT 250 FOR IP): Performed by: HOSPITALIST

## 2024-12-09 PROCEDURE — 97165 OT EVAL LOW COMPLEX 30 MIN: CPT

## 2024-12-09 PROCEDURE — 83036 HEMOGLOBIN GLYCOSYLATED A1C: CPT

## 2024-12-09 PROCEDURE — 85027 COMPLETE CBC AUTOMATED: CPT

## 2024-12-09 PROCEDURE — 93306 TTE W/DOPPLER COMPLETE: CPT | Performed by: SPECIALIST

## 2024-12-09 PROCEDURE — 70498 CT ANGIOGRAPHY NECK: CPT

## 2024-12-09 PROCEDURE — 80061 LIPID PANEL: CPT

## 2024-12-09 RX ORDER — CLOPIDOGREL BISULFATE 75 MG/1
75 TABLET ORAL DAILY
Status: DISCONTINUED | OUTPATIENT
Start: 2024-12-09 | End: 2024-12-09 | Stop reason: HOSPADM

## 2024-12-09 RX ORDER — ASPIRIN 81 MG/1
81 TABLET, CHEWABLE ORAL DAILY
Status: DISCONTINUED | OUTPATIENT
Start: 2024-12-09 | End: 2024-12-09 | Stop reason: HOSPADM

## 2024-12-09 RX ORDER — IOPAMIDOL 755 MG/ML
100 INJECTION, SOLUTION INTRAVASCULAR
Status: COMPLETED | OUTPATIENT
Start: 2024-12-09 | End: 2024-12-09

## 2024-12-09 RX ORDER — CLOPIDOGREL BISULFATE 75 MG/1
75 TABLET ORAL DAILY
Qty: 20 TABLET | Refills: 0 | Status: SHIPPED | OUTPATIENT
Start: 2024-12-09 | End: 2024-12-09 | Stop reason: HOSPADM

## 2024-12-09 RX ORDER — ACETAMINOPHEN 325 MG/1
650 TABLET ORAL EVERY 4 HOURS PRN
Status: DISCONTINUED | OUTPATIENT
Start: 2024-12-09 | End: 2024-12-09 | Stop reason: HOSPADM

## 2024-12-09 RX ORDER — ATORVASTATIN CALCIUM 20 MG/1
80 TABLET, FILM COATED ORAL DAILY
Status: DISCONTINUED | OUTPATIENT
Start: 2024-12-09 | End: 2024-12-09 | Stop reason: HOSPADM

## 2024-12-09 RX ORDER — ATORVASTATIN CALCIUM 40 MG/1
80 TABLET, FILM COATED ORAL DAILY
Qty: 60 TABLET | Refills: 0 | Status: SHIPPED | OUTPATIENT
Start: 2024-12-09

## 2024-12-09 RX ORDER — ENOXAPARIN SODIUM 100 MG/ML
30 INJECTION SUBCUTANEOUS 2 TIMES DAILY
Status: DISCONTINUED | OUTPATIENT
Start: 2024-12-09 | End: 2024-12-09 | Stop reason: HOSPADM

## 2024-12-09 RX ADMIN — ACETAMINOPHEN 650 MG: 325 TABLET ORAL at 13:58

## 2024-12-09 RX ADMIN — INSULIN LISPRO 1 UNITS: 100 INJECTION, SOLUTION INTRAVENOUS; SUBCUTANEOUS at 13:05

## 2024-12-09 RX ADMIN — ENOXAPARIN SODIUM 30 MG: 100 INJECTION SUBCUTANEOUS at 05:47

## 2024-12-09 RX ADMIN — ACETAMINOPHEN 650 MG: 325 TABLET ORAL at 06:23

## 2024-12-09 RX ADMIN — ATORVASTATIN CALCIUM 80 MG: 20 TABLET, FILM COATED ORAL at 09:20

## 2024-12-09 RX ADMIN — TICAGRELOR 180 MG: 90 TABLET ORAL at 17:44

## 2024-12-09 RX ADMIN — CLOPIDOGREL BISULFATE 75 MG: 75 TABLET ORAL at 12:21

## 2024-12-09 RX ADMIN — IOPAMIDOL 100 ML: 755 INJECTION, SOLUTION INTRAVENOUS at 12:31

## 2024-12-09 RX ADMIN — ASPIRIN 81 MG: 81 TABLET, CHEWABLE ORAL at 12:21

## 2024-12-09 RX ADMIN — SODIUM CHLORIDE, PRESERVATIVE FREE 10 ML: 5 INJECTION INTRAVENOUS at 09:22

## 2024-12-09 ASSESSMENT — PAIN DESCRIPTION - LOCATION
LOCATION: HEAD
LOCATION: HEAD

## 2024-12-09 ASSESSMENT — PAIN - FUNCTIONAL ASSESSMENT: PAIN_FUNCTIONAL_ASSESSMENT: ACTIVITIES ARE NOT PREVENTED

## 2024-12-09 ASSESSMENT — PAIN DESCRIPTION - ORIENTATION: ORIENTATION: RIGHT;LEFT

## 2024-12-09 ASSESSMENT — PAIN SCALES - GENERAL
PAINLEVEL_OUTOF10: 0
PAINLEVEL_OUTOF10: 0
PAINLEVEL_OUTOF10: 6

## 2024-12-09 NOTE — CARE COORDINATION
Care Management Initial Assessment  12/9/2024 12:20 PM  If patient is discharged prior to next notation, then this note serves as note for discharge by case management.    Reason for Admission:   Diplopia [H53.2]  Acute cerebrovascular accident (CVA) (HCC) [I63.9]  Cerebrovascular accident (CVA), unspecified mechanism (HCC) [I63.9]         Patient Admission Status: Inpatient  Date Admitted to INP: 12/8  RUR: Readmission Risk Score: 8.1    Hospitalization in the last 30 days (Readmission):  No        Advance Care Planning:  Code Status: Full Code  Primary Healthcare Decision Maker:     Advance Directive: has NO advanced directive - not interested in additional information     __________________________________________________________________________  Assessment:      12/09/24 1219   Service Assessment   Patient Orientation Alert and Oriented   History Provided By Medical Record   Support Systems Spouse/Significant Other   Prior Functional Level Independent in ADLs/IADLs   Social/Functional History   Lives With Spouse   Discharge Planning   Patient expects to be discharged to: House   Services At/After Discharge   Mode of Transport at Discharge Other (see comment)  (family)   Confirm Follow Up Transport Self     Comments: Patient with low readmission risk score of 8%. No identified CM needs at this time. Please consult CM for any discharge planning needs that may arise.     Discharge Concerns: []Yes [x]No []Unknown   Describe:    Financial concerns/barriers: []Yes, explain: []No [x]Unknown/Not discussed  __________________________________________________________________________    Insurer:   Active Insurance as of 12/8/2024       Primary Coverage       Payor Plan Insurance Group Employer/Plan Group    HUMANA MEDICARE HUMANA GOLD PLUS HMO [id number]       Payor Plan Address Payor Plan Phone Number Payor Plan Fax Number Effective Dates    [address] [phone]  1/1/2023 - None Entered    [facility] [phone]

## 2024-12-09 NOTE — DISCHARGE INSTRUCTIONS
instructions were explained to me and I had the opportunity to ask questions.    I understand and acknowledge receipt of the instructions indicated above.                                                                                                                                               Physician's or R.N.'s Signature                                                                  Date/Time                                                                                                                                              Patient or Representative Signature                                                          Date/Time

## 2024-12-09 NOTE — THERAPY EVALUATION
understanding as evidenced by acknowledgement of deficits and diagnosis.    Patient and/or family was verbally and visually educated on the BE FAST acronym for signs/symptoms of CVA and TIA.  All questions answered with patient indicating good  understanding.     Fugl-Medina Assessment of Motor Recovery after Stroke:     Reflex Activity  Flexors/Biceps/Fingers: Can be elicited  Extensors/Triceps: Can be elicited  Reflex Subtotal: 4    Volitional Movement Within Synergies  Shoulder Retraction: Full  Shoulder Elevation: Full  Shoulder Abduction (90 degrees): Full  Shoulder External Rotation: Full  Elbow Flexion: Full  Forearm Supination: Full  Shoulder Adduction/Internal Rotation: Full  Elbow Extension: Full  Forearm Pronation: Full  Subtotal: 18    Volitional Movement Mixing Synergies  Hand to Lumbar Spine: Full  Shoulder Flexion (0-90 degrees): Full  Pronation-Supination: Full  Subtotal: 6    Volitional Movement With Little or No Synergy  Shoulder Abduction (0-90 degrees): Full  Shoulder Flexion ( degrees): Full  Pronation/Supination: Full  Subtotal: 6    Normal Reflex Activity  Biceps, Triceps, Finger Flexors: Full  Subtotal: 2    Upper Extremity Total   Upper Extremity Total: 36    Wrist  Stability at 15 Degree Dorsiflexion: Full  Repeated Dorsiflexion/ Volar Flexion: Full  Stability at 15 Degree Dorsiflexion: Full  Repeated Dorsiflexion/ Volar Flexion: Full  Circumduction: Full  Wrist Total: 10    Hand  Mass Flexion: Full  Mass Extension: Full  Grasp A: Full  Grasp B: Full  Grasp C: Full  Grasp D: Full  Grasp E: Full  Hand Total: 14    Coordination/Speed  Tremor: None  Dysmetria: None  Time: <1s  Coordination/Speed Total: 6    Total A-D  Total A-D (Motor Function): 66         This is a reliable/valid measure of arm function after a neurological event. It has established value to characterize functional status and for measuring spontaneous and therapy-induced recovery; tests proximal and distal motor

## 2024-12-09 NOTE — DISCHARGE SUMMARY
[id number] [id number]   Office [phone]  Fax [phone]       Discharge Note     Name: Ricardo Pat MRN: [id number]  Sex: Male   YOB: 1955  Age: 69 y.o.  PCP: Agustina Morocho MD     Date of admission: 12/8/2024  Date of discharge/transfer: 12/9/2024    Attending physician at admission: Genoveva Mckeon MD.  Attending physician at discharge/transfer:  Genoveva Mckeon MD.  Resident physician at discharge/transfer: Marlys Bronson MD     Consultants during hospitalization  IP CONSULT TO NEUROLOGY  IP CONSULT TO STROKE COORDINATOR     Admission diagnoses   Diplopia [H53.2]  Acute cerebrovascular accident (CVA) (HCC) [I63.9]  Cerebrovascular accident (CVA), unspecified mechanism (HCC) [I63.9]    Recommended follow-up after discharge    1. PCP-Agustina Morocho MD  2. Neurology - 8-12w for CVA  3. Neurointerventional surgery - SEVERO aneurysm  4. Ophthalmology - cataracts     Things to follow up on with PCP:   Lipid control  T2DM      History of Present Illness    As per admitting provider, Dr. Altamirano:   \"Ricardo is a 69 y.o.   male with PMHx hypertension, diabetes, hyperlipidemia, cataracts comes to the hospital with chief complaint of double vision and posterior headache.  As per the patient the symptoms started yesterday.  He has been having mild headache for the last few days.  His double vision started yesterday around 12 PM.  He attributed his double vision to fatigue because he has not been sleeping as much for the last 1 week.  Patient has an appointment with an eye doctor tomorrow for a cataract surgery.  Patient denies any focal weakness or numbness.  His double vision continued today so he decided to come to the ER.  Patient denies any nausea vomiting diarrhea constipation.  No fever or chills.  When he arrived to the ER his blood pressure was 165/75, pulse 75, respiratory rate 18, temperature 97.9.  Blood work showed a sodium 138, potassium 4.2,

## 2024-12-09 NOTE — CONSULTS
[facility]    Mercedes Fierro, LIZZ, CNRN, ACNP-BC  [facility]  [address]  [phone]        Name:   Ricardo Pat   Medical record #: [id number]  Admission Date: 12/8/2024       Consult requested by: Helder Gill MD     Reason for Consult:  Stroke      HISTORY OF PRESENT ILLNESS:     This is a 69 y.o. male who is admitted for acute stroke.    Ricardo Pat presented to the ED on 12/8/2024 with an approximately 24-hour history of double vision and posterior headache.  He reported that he had blurred vision in both eyes but that when he closed 1 eye he was able to see normally in the other.  Upon arrival to the ED his exam was nonfocal with a presenting blood pressure of 165/75.  Review of admission labs shows a presenting creatinine of 1.47, glucose of 178, no evidence of transaminitis, normal CBC.      In discussion with Mr. Pat he tells me that he woke up Friday night and noted that his right arm was weak and numb, the weakness resolved fairly quickly while the numbness persisted.  On Saturday morning he woke up and had blurred vision worse in his left eye and could see best by closing either eye.  He became concerned when his symptoms lasted greater than 24 hours and presented to the ED for further evaluation.  The Neurology Service is asked to evaluate for stroke.    Patient has not been seen by the [facility] Neurology team previously.      Neuro-imaging:     CT Head: No acute process    CTA Head and Neck: Pending    MRI brain: Small acute infarcts of the left parietal and occipital lobe        EKG: normal sinus rhythm.      Plan of care discussed with:  Patient, Primary team, Spouse at bedside , and Family at bedside      Impression/ Plan:      1.  69-year-old male with multiple stroke risk factors who presents with multiple embolic strokes in the left MCA territory:    DAPT x 21 days followed by Plavix monitor therapy starting on 12/30/2024  P2 Y12

## 2024-12-09 NOTE — PROGRESS NOTES
0600 Patient complained of an 6/10 raging headache. Paged NP for tylenol prn,    0615 Meds given. Will recheck patients pain in one hour.  
Physical Therapy    Order received, chart reviewed. Patient with +L corona radiata/parietal infarcts and noted visual changes. PT order since cancelled. Discussed with RN- please re-consult with any needs should they arise prior to discharge.    ITA COLMENARES, PT    
Placing discharge order timed for 12/9/24 at 6pm. Anticipate results of P2Y12 lab in the next hour which will inform discharge planning - please do not discharge prior.    Juventino Onofre MD  PGY3 Family Medicine Resident  [facility]  
Spiritual Care Partner Volunteer visited patient at [facility] in [facility] B[facility] INTERMEDIATE CARE UNIT on 12/9/2024    Documented by:  Chaplain Maggie Haney MS, MDiv, [facility]  Spiritual Health Services  Paging service: [phone] (JAMEY)    
Stroke Education provided to patient and the following topics were discussed    1. Patients personal risk factors for stroke are diabetes mellitus and hypertension    2. Warning signs of Stroke:        * Sudden numbness or weakness of the face, arm or leg, especially on one side of          The body            * Sudden confusion, trouble speaking or understanding        * Sudden trouble seeing in one or both eyes        * Sudden trouble walking, dizziness, loss of balance or coordination        * Sudden severe headache with no known cause      3. Importance of activation Emergency Medical Services ( 9-1-1 ) immediately if experience any warning signs of stroke.    4. Be sure and schedule a follow-up appointment with your primary care doctor or any specialists as instructed.     5. You must take medicine every day to treat your risk factors for stroke.  Be sure to take your medicines exactly as your doctor tells you: no more, no less.  Know what your medicines are for , what they do.  Anti-thrombotics /anticoagulants can help prevent strokes.  You are taking the following medicine(s)  lOVENOX     6.  Smoking and second-hand smoke greatly increase your risk of stroke, cardiovascular disease and death. Smoking history packs, 1 per week    7. Information provided was [facility] Stroke Education Binder, Stroke Handouts, or Verbal Education    8. Documentation of teaching completed in Patient Education Activity and on Care Plan with teaching response noted?  yes    
Vascular carotid attempted. PT GURPREET.  
foci of acute infarction at the left frontal corona radiata  and at the left parietal lobe without associated hemorrhage, midline shift or  mass effect.    There is no intracranial mass, hemorrhage .   No additional acute intracranial process is demonstrated.    Electronically signed by JO ANN GARCIA  CT HEAD WO CONTRAST  Narrative: EXAM: CT HEAD WO CONTRAST    INDICATION: posterior head pain, diplopia    COMPARISON: None.    CONTRAST: None.    TECHNIQUE: Unenhanced CT of the head was performed using 5 mm images. Brain and  bone windows were generated. Coronal and sagittal reformats. CT dose reduction  was achieved through use of a standardized protocol tailored for this  examination and automatic exposure control for dose modulation.      FINDINGS:  The ventricles and sulci are normal in size, shape and configuration. There is  no significant white matter disease. There is no intracranial hemorrhage,  extra-axial collection, or mass effect. The basilar cisterns are open. No CT  evidence of acute infarct.    The bone windows demonstrate no abnormalities. The visualized portions of the  paranasal sinuses and mastoid air cells are clear.  Impression: No acute intracranial abnormality.    Electronically signed by Sukhwinder Parikh            Assessment and Plan     Ricardo Pat is a 69 y.o. male with PMHx HTN, HLD, T2DM, cataracts admitted for CVA.    CVA: LKW 12/5 morning. Had RUE numbness/paraesthesia that resolved. P/w diplopia. NIH 0 not a candidate for TNK. CT head NAP. MRI with scattered punctate foci of infarct on L corona radiata and L parietal lobe. Persisting vertical diplopia this am otherwise neurologically no deficits.   - Admit to neuro/stroke  - Neuro consulted, appreciate recs  - Neuro-checks   - Fall precautions  - A1c, lipids pending  - TTE pending  - CTA head and neck pending  - Daily CBC, BMP  - PT/OT  - Regular diet    HTN: Chronic, home lisinopril 10 mg qd, amlodipine 10 mg qd, HCTZ 12.5 mg qd  - HOLD

## 2024-12-10 ENCOUNTER — OFFICE VISIT (OUTPATIENT)
Age: 69
End: 2024-12-10

## 2024-12-10 ENCOUNTER — TELEPHONE (OUTPATIENT)
Facility: HOSPITAL | Age: 69
End: 2024-12-10

## 2024-12-10 VITALS
WEIGHT: 238 LBS | HEIGHT: 72 IN | OXYGEN SATURATION: 94 % | DIASTOLIC BLOOD PRESSURE: 63 MMHG | TEMPERATURE: 98 F | SYSTOLIC BLOOD PRESSURE: 108 MMHG | HEART RATE: 88 BPM | BODY MASS INDEX: 32.23 KG/M2 | RESPIRATION RATE: 18 BRPM

## 2024-12-10 DIAGNOSIS — I10 ESSENTIAL (PRIMARY) HYPERTENSION: ICD-10-CM

## 2024-12-10 DIAGNOSIS — Z09 HOSPITAL DISCHARGE FOLLOW-UP: Primary | ICD-10-CM

## 2024-12-10 RX ORDER — AMLODIPINE BESYLATE 10 MG/1
10 TABLET ORAL DAILY
Qty: 90 TABLET | Refills: 3 | Status: SHIPPED | OUTPATIENT
Start: 2024-12-10

## 2024-12-10 ASSESSMENT — PATIENT HEALTH QUESTIONNAIRE - PHQ9
SUM OF ALL RESPONSES TO PHQ QUESTIONS 1-9: 0
SUM OF ALL RESPONSES TO PHQ QUESTIONS 1-9: 0
1. LITTLE INTEREST OR PLEASURE IN DOING THINGS: NOT AT ALL
SUM OF ALL RESPONSES TO PHQ9 QUESTIONS 1 & 2: 0
2. FEELING DOWN, DEPRESSED OR HOPELESS: NOT AT ALL
SUM OF ALL RESPONSES TO PHQ QUESTIONS 1-9: 0
SUM OF ALL RESPONSES TO PHQ QUESTIONS 1-9: 0

## 2024-12-10 NOTE — PROGRESS NOTES
[address] [place] [address]   Office [phone], Fax [phone]     Post-Discharge Transitional Care Follow Up      Ricardo Pat   YOB: 1955    Date of Office Visit:  12/10/2024  Date of Hospital Admission: 12/8/24  Date of Hospital Discharge: 12/9/24  Readmission Risk Score (high >=14%. Medium >=10%):Readmission Risk Score: 7.7      Care management risk score Rising risk (score 2-5) and Complex Care (Scores >=6): No Risk Score On File     Non face to face  following discharge, date last encounter closed (first attempt may have been earlier): 12/10/2024     Call initiated 2 business days of discharge: Yes     Hospital discharge follow-up  Essential (primary) hypertension  The following orders have not been finalized:  -     amLODIPine (NORVASC) 10 MG tablet    CVA: LKW 12/5 morning. Had RUE numbness/paraesthesia that resolved. P/w diplopia. NIH 0 not a candidate for TNK. CT head NAP. MRI with scattered punctate foci of infarct on L corona radiata and L parietal lobe. Persisting vertical diplopia this am otherwise neurologically no deficits. TTE with normal EF 55%.   - Neuro consulted, appreciate recs: follow up outpatient 8-12wk- has appt Jan 17th   - A1c at goal  - Increased atorvastatin from 40 to 80 mg qd  - DAPT for 21d with ASA/Brillanta then Brillanta monotherapy      SEVERO Aneurysm: 4mm SEVERO aneurysm noted on CTA head/neck during CVA workup.  - Neuro-intervention follow up outpatient- has received a message about scheduling      HTN: Chronic, home lisinopril 10 mg qd, amlodipine 10 mg qd, HCTZ 12.5 mg qd  - Resume home med      T2DM: Chronic. Home glyburide-metformin  mg BID. A1c 6.6%  - continue lgyburide-metformin  mg BID     HLD: Chronic. LDL 83 above goal for CVA patients   - atorvastatin to 80 mg qd     Cataracts: was scheduled for removal 12/9, will need to reschedule with optho  - Has FU in 3 weeks with Optho      Medical Decision Making: moderate

## 2024-12-10 NOTE — PROGRESS NOTES
Room 20    Patient is accompanied by self. I have received verbal consent from Ricardo Pat to discuss any/all medical information while they are present in the room.    Identified pt with two pt identifiers(name and ). Reviewed record in preparation for visit and have obtained necessary documentation.  Chief Complaint   Patient presents with    Follow-Up from Hospital        Health Maintenance Due   Topic    Diabetic retinal exam     Colorectal Cancer Screen     Pneumococcal 65+ years Vaccine (2 of 2 - PCV)    AAA screen     Diabetic foot exam     Annual Wellness Visit (Medicare Advantage)        Vitals:    12/10/24 1630   BP: 108/63   Site: Left Upper Arm   Position: Sitting   Cuff Size: Large Adult   Pulse: 88   Resp: 18   Temp: 98 °F (36.7 °C)   TempSrc: Temporal   SpO2: 94%   Weight: 108 kg (238 lb)   Height: 1.829 m (6')       Social Determinants Of Health:       SDOH screening not required at visit.  Resources Declined.   See AVS for attached resources, if requested.    Coordination of Care Questionnaire:       \"Have you been to the ER, urgent care clinic since your last visit?  Hospitalized since your last visit?\"    2024 / [facility] / CVA    “Have you seen or consulted any other health care providers outside of [facility] since your last visit?”    NO        “Have you had a colorectal cancer screening such as a colonoscopy/FIT/Cologuard?    NO    No colonoscopy on file  No cologuard on file  No FIT/FOBT on file   No flexible sigmoidoscopy on file         Click Here for Release of Records Request

## 2024-12-10 NOTE — TELEPHONE ENCOUNTER
Pt needs a hospital follow up appointment  Provider:  Dr. Coon  When: next available, not urgent  Diagnosis/reason for follow up:  4 mm a-comm aneurysm

## 2024-12-29 LAB — ECHO BSA: 2.35 M2

## 2025-01-07 RX ORDER — ATORVASTATIN CALCIUM 40 MG/1
80 TABLET, FILM COATED ORAL DAILY
Qty: 180 TABLET | Refills: 0 | Status: SHIPPED | OUTPATIENT
Start: 2025-01-07 | End: 2025-04-07

## 2025-01-08 ENCOUNTER — OFFICE VISIT (OUTPATIENT)
Age: 70
End: 2025-01-08
Payer: MEDICARE

## 2025-01-08 VITALS
TEMPERATURE: 97.7 F | OXYGEN SATURATION: 97 % | WEIGHT: 233 LBS | HEIGHT: 72 IN | BODY MASS INDEX: 31.56 KG/M2 | HEART RATE: 81 BPM | DIASTOLIC BLOOD PRESSURE: 62 MMHG | SYSTOLIC BLOOD PRESSURE: 130 MMHG

## 2025-01-08 DIAGNOSIS — I67.1 CEREBRAL ANEURYSM: Primary | ICD-10-CM

## 2025-01-08 PROCEDURE — 1123F ACP DISCUSS/DSCN MKR DOCD: CPT | Performed by: RADIOLOGY

## 2025-01-08 PROCEDURE — 3017F COLORECTAL CA SCREEN DOC REV: CPT | Performed by: RADIOLOGY

## 2025-01-08 PROCEDURE — 99204 OFFICE O/P NEW MOD 45 MIN: CPT | Performed by: RADIOLOGY

## 2025-01-08 PROCEDURE — 3075F SYST BP GE 130 - 139MM HG: CPT | Performed by: RADIOLOGY

## 2025-01-08 PROCEDURE — G8417 CALC BMI ABV UP PARAM F/U: HCPCS | Performed by: RADIOLOGY

## 2025-01-08 PROCEDURE — G8427 DOCREV CUR MEDS BY ELIG CLIN: HCPCS | Performed by: RADIOLOGY

## 2025-01-08 PROCEDURE — 1111F DSCHRG MED/CURRENT MED MERGE: CPT | Performed by: RADIOLOGY

## 2025-01-08 PROCEDURE — 3078F DIAST BP <80 MM HG: CPT | Performed by: RADIOLOGY

## 2025-01-08 PROCEDURE — 1036F TOBACCO NON-USER: CPT | Performed by: RADIOLOGY

## 2025-01-08 NOTE — PROGRESS NOTES
New patient referred by [facility] presenting with Cerebral aneurysm.  Spouse and daughter at visit.  Patient reports only residual from stroke is droopy left eye and visual deficit.  Denies headaches, dizziness, numbness or tingling.  No new symptoms reported.

## 2025-01-08 NOTE — PATIENT INSTRUCTIONS
Follow up in 1 year after MRA imaging is completed.   See attached paperwork to schedule imaging.  Results of Carotid Duplex will be called to you.

## 2025-01-17 ENCOUNTER — OFFICE VISIT (OUTPATIENT)
Age: 70
End: 2025-01-17
Payer: MEDICARE

## 2025-01-17 VITALS
BODY MASS INDEX: 31.29 KG/M2 | HEART RATE: 83 BPM | SYSTOLIC BLOOD PRESSURE: 136 MMHG | OXYGEN SATURATION: 97 % | HEIGHT: 72 IN | DIASTOLIC BLOOD PRESSURE: 60 MMHG | WEIGHT: 231 LBS | RESPIRATION RATE: 18 BRPM

## 2025-01-17 DIAGNOSIS — E78.2 MIXED HYPERLIPIDEMIA: ICD-10-CM

## 2025-01-17 DIAGNOSIS — I63.9 CEREBROVASCULAR ACCIDENT (CVA), UNSPECIFIED MECHANISM (HCC): Primary | ICD-10-CM

## 2025-01-17 DIAGNOSIS — I10 ESSENTIAL HYPERTENSION: ICD-10-CM

## 2025-01-17 PROCEDURE — 99204 OFFICE O/P NEW MOD 45 MIN: CPT | Performed by: SPECIALIST

## 2025-01-17 PROCEDURE — 1160F RVW MEDS BY RX/DR IN RCRD: CPT | Performed by: SPECIALIST

## 2025-01-17 PROCEDURE — 1126F AMNT PAIN NOTED NONE PRSNT: CPT | Performed by: SPECIALIST

## 2025-01-17 PROCEDURE — 1123F ACP DISCUSS/DSCN MKR DOCD: CPT | Performed by: SPECIALIST

## 2025-01-17 PROCEDURE — 3075F SYST BP GE 130 - 139MM HG: CPT | Performed by: SPECIALIST

## 2025-01-17 PROCEDURE — 1159F MED LIST DOCD IN RCRD: CPT | Performed by: SPECIALIST

## 2025-01-17 PROCEDURE — 3078F DIAST BP <80 MM HG: CPT | Performed by: SPECIALIST

## 2025-01-17 PROCEDURE — 3017F COLORECTAL CA SCREEN DOC REV: CPT | Performed by: SPECIALIST

## 2025-01-17 PROCEDURE — 1036F TOBACCO NON-USER: CPT | Performed by: SPECIALIST

## 2025-01-17 PROCEDURE — G8427 DOCREV CUR MEDS BY ELIG CLIN: HCPCS | Performed by: SPECIALIST

## 2025-01-17 PROCEDURE — G8417 CALC BMI ABV UP PARAM F/U: HCPCS | Performed by: SPECIALIST

## 2025-01-17 RX ORDER — ATORVASTATIN CALCIUM 40 MG/1
80 TABLET, FILM COATED ORAL DAILY
COMMUNITY

## 2025-01-17 NOTE — PATIENT INSTRUCTIONS
Patient Education        Learning About the Mediterranean Diet  What is the Mediterranean diet?     The Mediterranean diet is a style of eating rather than a diet plan. It features foods eaten in [place], [place], [place], and other countries along the [place] Sea. It emphasizes eating foods like fish, fruits, vegetables, beans, high-fiber breads and whole grains, nuts, and olive oil. This style of eating includes limited red meat, cheese, and sweets.  Why choose the Mediterranean diet?  A Mediterranean-style diet may improve heart health. It contains more fat than other heart-healthy diets. But the fats are mainly from nuts, unsaturated oils (such as fish oils and olive oil), and certain nut or seed oils (such as canola, soybean, or flaxseed oil). These fats may help protect the heart and blood vessels.  How can you get started on the Mediterranean diet?  Here are some things you can do to switch to a more Mediterranean way of eating.  What to eat  Eat a variety of fruits and vegetables each day, such as grapes, blueberries, tomatoes, broccoli, peppers, figs, olives, spinach, eggplant, beans, lentils, and chickpeas.  Eat a variety of whole-grain foods each day, such as oats, brown rice, and whole wheat bread, pasta, and couscous.  Eat fish at least 2 times a week. Try tuna, salmon, mackerel, lake trout, herring, or sardines.  Eat moderate amounts of low-fat dairy products, such as milk, cheese, or yogurt.  Eat moderate amounts of poultry and eggs.  Choose healthy (unsaturated) fats, such as nuts, olive oil, and certain nut or seed oils like canola, soybean, and flaxseed.  Limit unhealthy (saturated) fats, such as butter, palm oil, and coconut oil. And limit fats found in animal products, such as meat and dairy products made with whole milk. Try to eat red meat only a few times a month in very small amounts.  Limit sweets and desserts to only a few times a week. This includes sugar-sweetened

## 2025-01-17 NOTE — PROGRESS NOTES
Jaelyn Bell MD. [facility]          Patient: Ricardo Pat  : 1955      Today's Date: 2025      HISTORY OF PRESENT ILLNESS:     History of Present Illness:    Referred after CVA 24. Still having some double vision since then. Denies chest pain, palpitations, SOB, edema. BP at home usually 130s/60s.       PAST MEDICAL HISTORY:     Past Medical History:   Diagnosis Date    CVA (cerebral vascular accident) (HCC)     Hypercholesterolemia     Hypertension     Nonspecific abnormal electrocardiogram (ECG) (EKG) 3/17/2011    Other dyspnea and respiratory abnormality 3/17/2011    Pre-operative cardiovascular examination 3/17/2011    Type II or unspecified type diabetes mellitus without mention of complication, not stated as uncontrolled 3/17/2011    Vision problems        Past Surgical History:   Procedure Laterality Date    HERNIA REPAIR               CURRENT MEDICATIONS:    .  Current Outpatient Medications   Medication Sig Dispense Refill    atorvastatin (LIPITOR) 40 MG tablet Take 2 tablets by mouth daily      ticagrelor (BRILINTA) 90 MG TABS tablet Take 1 tablet by mouth 2 times daily 180 tablet 0    amLODIPine (NORVASC) 10 MG tablet Take 1 tablet by mouth daily 90 tablet 3    glyBURIDE-metFORMIN (GLUCOVANCE) 5-500 MG per tablet Take 2 tablets by mouth daily (with breakfast) AND 1 tablet Daily with supper. 270 tablet 1    hydroCHLOROthiazide 12.5 MG tablet Take 1 tablet by mouth every morning NEEDS APPT FOR FURTHER REFILLS 90 tablet 2    lisinopril (PRINIVIL;ZESTRIL) 10 MG tablet Take 1 tablet by mouth daily NEEDS APPT FOR FURTHER REFILLS 90 tablet 2    vitamin D 25 MCG (1000 UT) CAPS Take by mouth 2 times daily       No current facility-administered medications for this visit.       No Known Allergies      SOCIAL HISTORY:     Social History     Tobacco Use    Smoking status: Former     Passive exposure: Never    Smokeless tobacco: Never   Vaping Use    Vaping status: Never Used   Substance Use Topics

## 2025-01-17 NOTE — PROGRESS NOTES
had concerns including Follow-Up from Hospital.    Vitals:    01/17/25 1134   BP: 136/60   Site: Left Upper Arm   Position: Sitting   Pulse: 83   Resp: 18   SpO2: 97%   Weight: 104.8 kg (231 lb)   Height: 1.829 m (6')        Chest pain No    Refills No        1. Have you been to the ER, urgent care clinic since your last visit? No       Hospitalized since your last visit? No       Where?        Date?

## 2025-01-20 ASSESSMENT — SLEEP AND FATIGUE QUESTIONNAIRES
DO YOU GET TOO LITTLE SLEEP AT NIGHT: NO
HOW LIKELY ARE YOU TO NOD OFF OR FALL ASLEEP WHILE WATCHING TV: SLIGHT CHANCE OF DOZING
AVERAGE NUMBER OF SLEEP HOURS: 7
HOW LIKELY ARE YOU TO NOD OFF OR FALL ASLEEP WHILE SITTING QUIETLY AFTER LUNCH WITHOUT ALCOHOL: SLIGHT CHANCE OF DOZING
HOW LIKELY ARE YOU TO NOD OFF OR FALL ASLEEP WHILE SITTING AND TALKING TO SOMEONE: WOULD NEVER DOZE
HOW LIKELY ARE YOU TO NOD OFF OR FALL ASLEEP WHILE LYING DOWN TO REST IN THE AFTERNOON WHEN CIRCUMSTANCES PERMIT: MODERATE CHANCE OF DOZING
ESS TOTAL SCORE: 7
HOW LIKELY ARE YOU TO NOD OFF OR FALL ASLEEP WHEN YOU ARE A PASSENGER IN A CAR FOR AN HOUR WITHOUT A BREAK: WOULD NEVER DOZE
ARE YOU BOTHERED BY WAKING UP TOO EARLY AND NOT BEING ABLE TO GET BACK TO SLEEP: NO
SELECT ANY OF THE FOLLOWING BEHAVIORS OBSERVED WHILE PATIENT ASLEEP: LIGHT SNORING
HOW LIKELY ARE YOU TO NOD OFF OR FALL ASLEEP WHILE SITTING INACTIVE IN A PUBLIC PLACE: SLIGHT CHANCE OF DOZING
HOW LIKELY ARE YOU TO NOD OFF OR FALL ASLEEP IN A CAR, WHILE STOPPED FOR A FEW MINUTES IN TRAFFIC: WOULD NEVER DOZE
HOW LIKELY ARE YOU TO NOD OFF OR FALL ASLEEP WHILE SITTING AND READING: MODERATE CHANCE OF DOZING

## 2025-01-31 ENCOUNTER — TELEPHONE (OUTPATIENT)
Age: 70
End: 2025-01-31

## 2025-01-31 NOTE — TELEPHONE ENCOUNTER
I reached to pt and asked him to give our office a call back so that we can schedule his follow up to see dr chopra, since he has his  imaging already scheduled for 2/8/2025.

## 2025-02-03 NOTE — PROGRESS NOTES
[address] [id number]   Office [phone], Fax [phone]    Medicare Annual Wellness Visit    Ricardo Pat is here for Medicare AW    Assessment & Plan       CVA: 12/2024. MRI with scattered punctate foci of infarct on L corona radiata and L parietal lobe. TTE with normal EF 55%.   -cont follow up w neuro   - A1c at goal  - atorvastatin 80 mg qd  -  Brillanta and ASA      SEVERO Aneurysm: 4mm SEVERO aneurysm noted on CTA head/neck during CVA workup.  - Saw Neurointerventional surgery 1/8/25 - follow with Dr. Coon with the neurointerventional team.  He will have surveillance MRA of the head for the 4 mm SEVERO aneurysm 1 year.  He also has carotid ultrasound scheduled for February 2025.      HTN: Chronic, home lisinopril 10 mg qd, amlodipine 10 mg qd, HCTZ 12.5 mg qd  - continue medications     T2DM: Chronic. Home glyburide-metformin  mg BID. A1c 6.6%  - continue lgyburide-metformin  mg BID     HLD: Chronic. LDL 83 above goal for CVA patients   - atorvastatin to 80 mg qd  -has fu with cards, considering adding zetia      Cataracts: was scheduled for removal 12/9, will need to reschedule with optho  - Has FU with Optho     PAULO  - CPAP stopped working- has appt w sleep medicine 2/7/25.     Concern for's shingles/neuropathic pain of left flank  No overlying skin changes or rash visualized on exam.  Has received shingles vaccine.  Had chickenpox.  Though given location and quality of symptoms could be shingles related neuralgia.  Patient did have recent stroke though less concern for post CVA sensation changes given no involvement of the left side of the body. infarct on L corona radiata and L parietal lobe.  -Pain has been going on for several weeks so not a good candidate for antiviral medication.  -Trial of lidocaine cream and  capsaicin cream as needed  -Continue to monitor for signs of rash  -Provided with callback precautions       Return in 3 months (on 5/6/2025) for follow

## 2025-02-04 ENCOUNTER — TELEMEDICINE (OUTPATIENT)
Age: 70
End: 2025-02-04
Payer: MEDICARE

## 2025-02-04 DIAGNOSIS — H53.2 DIPLOPIA: ICD-10-CM

## 2025-02-04 DIAGNOSIS — Z86.73 HISTORY OF STROKE: Primary | ICD-10-CM

## 2025-02-04 PROBLEM — I63.9 CEREBROVASCULAR ACCIDENT (CVA) (HCC): Status: RESOLVED | Noted: 2024-12-08 | Resolved: 2025-02-04

## 2025-02-04 PROCEDURE — 1123F ACP DISCUSS/DSCN MKR DOCD: CPT | Performed by: NURSE PRACTITIONER

## 2025-02-04 PROCEDURE — 1159F MED LIST DOCD IN RCRD: CPT | Performed by: NURSE PRACTITIONER

## 2025-02-04 PROCEDURE — 99215 OFFICE O/P EST HI 40 MIN: CPT | Performed by: NURSE PRACTITIONER

## 2025-02-04 PROCEDURE — G8417 CALC BMI ABV UP PARAM F/U: HCPCS | Performed by: NURSE PRACTITIONER

## 2025-02-04 PROCEDURE — G8427 DOCREV CUR MEDS BY ELIG CLIN: HCPCS | Performed by: NURSE PRACTITIONER

## 2025-02-04 PROCEDURE — 1036F TOBACCO NON-USER: CPT | Performed by: NURSE PRACTITIONER

## 2025-02-04 PROCEDURE — 1160F RVW MEDS BY RX/DR IN RCRD: CPT | Performed by: NURSE PRACTITIONER

## 2025-02-04 PROCEDURE — 3017F COLORECTAL CA SCREEN DOC REV: CPT | Performed by: NURSE PRACTITIONER

## 2025-02-04 RX ORDER — ASPIRIN 81 MG/1
81 TABLET ORAL DAILY
Qty: 90 TABLET | Refills: 3 | COMMUNITY
Start: 2025-02-04

## 2025-02-04 SDOH — HEALTH STABILITY: PHYSICAL HEALTH
ON AVERAGE, HOW MANY DAYS PER WEEK DO YOU ENGAGE IN MODERATE TO STRENUOUS EXERCISE (LIKE A BRISK WALK)?: PATIENT DECLINED

## 2025-02-04 ASSESSMENT — PATIENT HEALTH QUESTIONNAIRE - PHQ9
SUM OF ALL RESPONSES TO PHQ QUESTIONS 1-9: 0
1. LITTLE INTEREST OR PLEASURE IN DOING THINGS: NOT AT ALL
2. FEELING DOWN, DEPRESSED OR HOPELESS: NOT AT ALL
SUM OF ALL RESPONSES TO PHQ9 QUESTIONS 1 & 2: 0
SUM OF ALL RESPONSES TO PHQ QUESTIONS 1-9: 0

## 2025-02-04 ASSESSMENT — SLEEP AND FATIGUE QUESTIONNAIRES
HOW LIKELY ARE YOU TO NOD OFF OR FALL ASLEEP WHILE SITTING INACTIVE IN A PUBLIC PLACE: SLIGHT CHANCE OF DOZING
DO YOU GET TOO LITTLE SLEEP AT NIGHT: NO
AVERAGE NUMBER OF SLEEP HOURS: 7
HOW LIKELY ARE YOU TO NOD OFF OR FALL ASLEEP WHILE SITTING AND TALKING TO SOMEONE: WOULD NEVER DOZE
HOW LIKELY ARE YOU TO NOD OFF OR FALL ASLEEP WHILE LYING DOWN TO REST IN THE AFTERNOON WHEN CIRCUMSTANCES PERMIT: MODERATE CHANCE OF DOZING
HOW LIKELY ARE YOU TO NOD OFF OR FALL ASLEEP WHILE SITTING AND TALKING TO SOMEONE: WOULD NEVER DOZE
HOW LIKELY ARE YOU TO NOD OFF OR FALL ASLEEP WHILE SITTING INACTIVE IN A PUBLIC PLACE: SLIGHT CHANCE OF DOZING
HOW LIKELY ARE YOU TO NOD OFF OR FALL ASLEEP WHEN YOU ARE A PASSENGER IN A CAR FOR AN HOUR WITHOUT A BREAK: WOULD NEVER DOZE
FOSQ SCORE: 19.5
DO YOU HAVE PROBLEMS WITH FREQUENT AWAKENINGS AT NIGHT: YES
DO YOU HAVE DIFFICULTY OPERATING A MOTOR VEHICLE FOR LONG DISTANCES (GREATER THAN 100 MILES) BECAUSE YOU BECOME SLEEPY: NO
DO YOU GENERALLY HAVE DIFFICULTY REMEMBERING THINGS BECAUSE YOU ARE SLEEPY OR TIRED: NO
WHAT TIME DO YOU USUALLY GO TO BED: 22:00
HOW LIKELY ARE YOU TO NOD OFF OR FALL ASLEEP WHILE SITTING AND READING: MODERATE CHANCE OF DOZING
DO YOU HAVE DIFFICULTY CONCENTRATING ON THE THINGS YOU DO BECAUSE YOU ARE SLEEPY OR TIRED: NO
SELECT ANY OF THE FOLLOWING BEHAVIORS OBSERVED WHILE YOU ARE ASLEEP: LIGHT SNORING
DO YOU GET TOO LITTLE SLEEP AT NIGHT: NO
HOW LIKELY ARE YOU TO NOD OFF OR FALL ASLEEP WHILE LYING DOWN TO REST IN THE AFTERNOON WHEN CIRCUMSTANCES PERMIT: MODERATE CHANCE OF DOZING
DO YOU HAVE DIFFICULTY BEING AS ACTIVE AS YOU WANT TO BE IN THE EVENING BECAUSE YOU ARE SLEEPY OR TIRED: NO
DO YOU HAVE DIFFICULTY OPERATING A MOTOR VEHICLE FOR SHORT DISTANCES (LESS THAN 100 MILES) BECAUSE YOU BECOME SLEEPY: NO
DO YOU TAKE NAPS: NO
DO YOU HAVE DIFFICULTY VISITING YOUR FAMILY OR FRIENDS IN THEIR HOME BECAUSE YOU BECOME SLEEPY OR TIRED: NO
HOW LIKELY ARE YOU TO NOD OFF OR FALL ASLEEP WHILE WATCHING TV: SLIGHT CHANCE OF DOZING
HAS YOUR RELATIONSHIP WITH FAMILY, FRIENDS OR WORK COLLEAGUES BEEN AFFECTED BECAUSE YOU ARE SLEEPY OR TIRED: NO
HOW LIKELY ARE YOU TO NOD OFF OR FALL ASLEEP WHILE WATCHING TV: SLIGHT CHANCE OF DOZING
NUMBER OF TIMES YOU WAKE PER NIGHT: 3
DO YOU HAVE DIFFICULTY BEING AS ACTIVE AS YOU WANT TO BE IN THE MORNING BECAUSE YOU ARE SLEEPY OR TIRED: NO
HOW LIKELY ARE YOU TO NOD OFF OR FALL ASLEEP WHILE SITTING QUIETLY AFTER LUNCH WITHOUT ALCOHOL: SLIGHT CHANCE OF DOZING
HOW LIKELY ARE YOU TO NOD OFF OR FALL ASLEEP WHILE SITTING AND READING: MODERATE CHANCE OF DOZING
HOW LIKELY ARE YOU TO NOD OFF OR FALL ASLEEP WHEN YOU ARE A PASSENGER IN A CAR FOR AN HOUR WITHOUT A BREAK: WOULD NEVER DOZE
WHAT TIME DO YOU USUALLY WAKE UP: 04:30
DO YOU WORK SHIFTS: NO
SELECT ANY OF THE FOLLOWING BEHAVIORS OBSERVED WHILE PATIENT ASLEEP: LIGHT SNORING
HAS YOUR MOOD BEEN AFFECTED BECAUSE YOU ARE SLEEPY OR TIRED: NO
HOW LIKELY ARE YOU TO NOD OFF OR FALL ASLEEP IN A CAR, WHILE STOPPED FOR A FEW MINUTES IN TRAFFIC: WOULD NEVER DOZE
ARE YOU BOTHERED BY WAKING UP TOO EARLY AND NOT BEING ABLE TO GET BACK TO SLEEP: YES
HOW LIKELY ARE YOU TO NOD OFF OR FALL ASLEEP IN A CAR, WHILE STOPPED FOR A FEW MINUTES IN TRAFFIC: WOULD NEVER DOZE
DO YOU HAVE DIFFICULTY WATCHING A MOVIE OR VIDEO BECAUSE YOU BECOME SLEEPY OR TIRED: YES, A LITTLE
ESS TOTAL SCORE: 7
ARE YOU BOTHERED BY WAKING UP TOO EARLY AND NOT BEING ABLE TO GET BACK TO SLEEP: YES
AVERAGE NUMBER OF SLEEP HOURS: 7
HOW LIKELY ARE YOU TO NOD OFF OR FALL ASLEEP WHILE SITTING QUIETLY AFTER LUNCH WITHOUT ALCOHOL: SLIGHT CHANCE OF DOZING

## 2025-02-04 ASSESSMENT — LIFESTYLE VARIABLES
HOW MANY STANDARD DRINKS CONTAINING ALCOHOL DO YOU HAVE ON A TYPICAL DAY: 0
HOW OFTEN DO YOU HAVE A DRINK CONTAINING ALCOHOL: NEVER
HOW OFTEN DO YOU HAVE SIX OR MORE DRINKS ON ONE OCCASION: 1
HOW MANY STANDARD DRINKS CONTAINING ALCOHOL DO YOU HAVE ON A TYPICAL DAY: PATIENT DOES NOT DRINK
HOW OFTEN DO YOU HAVE A DRINK CONTAINING ALCOHOL: 1

## 2025-02-04 NOTE — PROGRESS NOTES
commands.  Pleasant mood and affect.  Good insight with regards to hospitalization and present medical condition.  Asked appropriate questions.    Cranial Nerves:  I: smell Not tested   II: visual fields Not assessed   II: pupils Unable to reliably assess due to limitations of telehealth   II: optic disc Not assessed   III,VII: ptosis Subtle ptosis of the left eye.   III,IV,VI: extraocular muscles  Full ROM, if he concentrates his gaze become conjugate, on passive observation he has a subtle left 3rd nerve palsy but his extraocular eye movements are intact with full ROM   V: mastication Normal, speech is clear, swallowing without difficulty   V: facial light touch sensation  Denies any lateralizing sensory deficit or paresthesias to the face   VII: facial muscle function   symmetric   VIII: hearing symmetric   IX: soft palate elevation  Not assessed   XI: trapezius strength  Not assessed   XI: sternocleidomastoid strength Not assessed   XI: neck flexion strength  Not assessed   XII: tongue  Not assessed    -Motor: Not assessed as this is a telehealth visit.  Denies any lateralizing motor deficit or generalized weakness. Upon observation: normal bulk and tone is noted, do not appreciate tremor.     -Sensation: Patient denies any numbness or tingling in the bilateral upper extremities as well as bilateral lower extremities.   -Cerebellar Testing: Denies any feelings of imbalance.  Patient's movements appear to be smooth and purposeful   -DTRs: Unable to assess due to telehealth visit   -Gait: Ambulates without assist device.  No falls.    I spent at least 40 minutes on this visit with this established patient.    We discussed the expected course, resolution and complications of the diagnosis(es) in detail.  Medication risks, benefits, costs, interactions, and alternatives were discussed as indicated.  I advised him to contact the office if his condition worsens, changes or fails to improve as anticipated. He expressed

## 2025-02-04 NOTE — ASSESSMENT & PLAN NOTE
Patient with hypertension, hyperlipidemia, diabetes, PAULO (not using CPAP because his equipment is broken) who was admitted to the hospital 12/8-12/9/2024 with symptoms concerning for stroke visual disturbance with blurred / double vision in both eyes his right arm was weak and numb. MRI scan of the brain showed small acute infarcts in left parietal and occipital lobes.  Patient was discharged on dual antiplatelet therapy with Brilinta 90 mg twice daily and aspirin 81 mg for 21 days and then continue on monotherapy with Brilinta.    -CTA head and neck no large vessel occlusion, arterial dissection or hemodynamic significant stenosis.  Scattered atherosclerosis with moderate 50 to 60% left internal carotid artery stenosis 4 mm SEVERO aneurysm  -MRI brain scattered punctate foci of acute infarction in the left frontal corona radiata, left parietal occipital lobe without associated hemorrhage midline shift or mass effect.  -TTE: Ejection fraction 60%.  Left ventricular size is normal, normal wall thickness, normal wall motion, normal diastolic function.  Intra-atrial septum no shunt visualized with color Doppler.  Bubble study negative.  -Labs: Total cholesterol 156, LDL 83, hemoglobin A1c 6.6 P2Y12 228  -Cardiac event monitor.  He wore for 23 days.  Primary rhythm was sinus rhythm average heart rate was 70 bpm.  No atrial fibrillation    Patient initially was to be discharged on aspirin 81 mg, and Plavix 75 mg for 21 days and then continue on monotherapy with Plavix.  However patient was subsequently switched to Brilinta due to he is a Plavix nonresponder.  I explained to the patient today he should take the Brilinta 90 mg twice daily along with aspirin 81 mg.  He will resume taking aspirin  Continue Lipitor 80 mg nightly, goal LDL of 70  Patient has had follow-up with cardiology, no further cardiac testing is warranted at this time   Patient continues to follow with Dr. Coon with the neurointerventional team.  He will

## 2025-02-04 NOTE — ASSESSMENT & PLAN NOTE
- MRI brain scattered punctate foci of acute infarction in the left frontal corona radiata, left parietal occipital lobe without associated hemorrhage midline shift or mass effect.    Patient w/ 3rd nerve palsy in the setting of an acute infarct in the left corona radiata and left parietal occipital lobe.  3rd nerve palsy is not a symptom I would expect based on the location of the strokes, I do opine if patient had a small brainstem stroke too small to be visualized on brain MRI.  Fortunately his 3rd nerve palsy is significantly improved, he states his ophthalmologist feels the diplopia will completely resolved.

## 2025-02-06 ENCOUNTER — OFFICE VISIT (OUTPATIENT)
Age: 70
End: 2025-02-06
Payer: MEDICARE

## 2025-02-06 VITALS
BODY MASS INDEX: 31.69 KG/M2 | DIASTOLIC BLOOD PRESSURE: 57 MMHG | TEMPERATURE: 98 F | WEIGHT: 234 LBS | OXYGEN SATURATION: 96 % | HEIGHT: 72 IN | HEART RATE: 80 BPM | SYSTOLIC BLOOD PRESSURE: 116 MMHG | RESPIRATION RATE: 18 BRPM

## 2025-02-06 DIAGNOSIS — E11.22 TYPE 2 DIABETES MELLITUS WITH STAGE 3A CHRONIC KIDNEY DISEASE, WITHOUT LONG-TERM CURRENT USE OF INSULIN (HCC): ICD-10-CM

## 2025-02-06 DIAGNOSIS — Z13.6 ENCOUNTER FOR ABDOMINAL AORTIC ANEURYSM (AAA) SCREENING: ICD-10-CM

## 2025-02-06 DIAGNOSIS — I10 ESSENTIAL HYPERTENSION: ICD-10-CM

## 2025-02-06 DIAGNOSIS — E78.2 MIXED HYPERLIPIDEMIA: ICD-10-CM

## 2025-02-06 DIAGNOSIS — G47.33 OSA (OBSTRUCTIVE SLEEP APNEA): ICD-10-CM

## 2025-02-06 DIAGNOSIS — Z12.11 ENCOUNTER FOR SCREENING COLONOSCOPY: ICD-10-CM

## 2025-02-06 DIAGNOSIS — M79.2 NEUROPATHIC PAIN OF LEFT FLANK: ICD-10-CM

## 2025-02-06 DIAGNOSIS — I63.89 CEREBROVASCULAR ACCIDENT (CVA) DUE TO OTHER MECHANISM (HCC): ICD-10-CM

## 2025-02-06 DIAGNOSIS — Z00.00 MEDICARE ANNUAL WELLNESS VISIT, SUBSEQUENT: Primary | ICD-10-CM

## 2025-02-06 DIAGNOSIS — Z87.891 PERSONAL HISTORY OF NICOTINE DEPENDENCE: ICD-10-CM

## 2025-02-06 DIAGNOSIS — N18.31 TYPE 2 DIABETES MELLITUS WITH STAGE 3A CHRONIC KIDNEY DISEASE, WITHOUT LONG-TERM CURRENT USE OF INSULIN (HCC): ICD-10-CM

## 2025-02-06 DIAGNOSIS — H53.2 DIPLOPIA: ICD-10-CM

## 2025-02-06 PROCEDURE — 99213 OFFICE O/P EST LOW 20 MIN: CPT

## 2025-02-06 ASSESSMENT — PATIENT HEALTH QUESTIONNAIRE - PHQ9
SUM OF ALL RESPONSES TO PHQ QUESTIONS 1-9: 0
SUM OF ALL RESPONSES TO PHQ9 QUESTIONS 1 & 2: 0
2. FEELING DOWN, DEPRESSED OR HOPELESS: NOT AT ALL
SUM OF ALL RESPONSES TO PHQ QUESTIONS 1-9: 0
1. LITTLE INTEREST OR PLEASURE IN DOING THINGS: NOT AT ALL

## 2025-02-06 NOTE — PATIENT INSTRUCTIONS
Lidocaine cream or patches daily  capsaicin cream daily.  Continue tylenol as needed for pain.        Learning About Vision Tests  What are vision tests?     The four most common vision tests are visual acuity tests, refraction, visual field tests, and color vision tests.  Visual acuity (sharpness) tests  These tests are used:  To see if you need glasses or contact lenses.  To monitor an eye problem.  To check an eye injury.  Visual acuity tests are done as part of routine exams. You may also have this test when you get your 's license or apply for some types of jobs.  Visual field tests  These tests are used:  To check for vision loss in any area of your range of vision.  To screen for certain eye diseases.  To look for nerve damage after a stroke, head injury, or other problem that could reduce blood flow to the brain.  Refraction and color tests  A refraction test is done to find the right prescription for glasses and contact lenses.  A color vision test is done to check for color blindness.  Color vision is often tested as part of a routine exam. You may also have this test when you apply for a job where recognizing different colors is important, such as , electronics, or the .  How are vision tests done?  Visual acuity test   You cover one eye at a time.  You read aloud from a wall chart across the room.  You read aloud from a small card that you hold in your hand.  Refraction   You look into a special device.  The device puts lenses of different strengths in front of each eye to see how strong your glasses or contact lenses need to be.  Visual field tests   Your doctor may have you look through special machines.  Or your doctor may simply have you stare straight ahead while they move a finger into and out of your field of vision.  Color vision test   You look at pieces of printed test patterns in various colors. You say what number or symbol you see.  Your doctor may have you trace

## 2025-02-06 NOTE — PROGRESS NOTES
Room 20    Patient is accompanied by wife. I have received verbal consent from Ricardo Pat to discuss any/all medical information while they are present in the room.    Identified pt with two pt identifiers(name and ). Reviewed record in preparation for visit and have obtained necessary documentation.  Chief Complaint   Patient presents with    Medicare AWV     - Pain left upper abdomen , feel like scape and he feels all day . No visible rash , but issues x couple weeks     -  loose bowel movements - neuro told caused by lipitor     -  Carotid doppler is set for 2025     Health Maintenance Due   Topic    Colorectal Cancer Screen     Pneumococcal 50+ years Vaccine (2 of 2 - PCV)    AAA screen     Diabetic foot exam     Annual Wellness Visit (Medicare Advantage)        Vitals:    25 1414   BP: (!) 116/57   Site: Left Upper Arm   Position: Sitting   Cuff Size: Large Adult   Pulse: 80   Resp: 18   Temp: 98 °F (36.7 °C)   TempSrc: Temporal   SpO2: 96%   Weight: 106.1 kg (234 lb)   Height: 1.829 m (6')       Social Determinants Of Health:       SDOH screening completed at visit.  Resources Declined.   See AVS for attached resources, if requested.    Coordination of Care Questionnaire:       \"Have you been to the ER, urgent care clinic since your last visit?  Hospitalized since your last visit?\"    NO    “Have you seen or consulted any other health care providers outside of [facility] since your last visit?”    NO        “Have you had a colorectal cancer screening such as a colonoscopy/FIT/Cologuard?    NO    No colonoscopy on file  No cologuard on file  No FIT/FOBT on file   No flexible sigmoidoscopy on file         Click Here for Release of Records Request

## 2025-02-07 ENCOUNTER — OFFICE VISIT (OUTPATIENT)
Age: 70
End: 2025-02-07
Payer: MEDICARE

## 2025-02-07 VITALS
TEMPERATURE: 97.9 F | HEIGHT: 72 IN | OXYGEN SATURATION: 95 % | HEART RATE: 89 BPM | WEIGHT: 234.6 LBS | SYSTOLIC BLOOD PRESSURE: 126 MMHG | DIASTOLIC BLOOD PRESSURE: 68 MMHG | BODY MASS INDEX: 31.77 KG/M2

## 2025-02-07 DIAGNOSIS — G47.33 OSA (OBSTRUCTIVE SLEEP APNEA): Primary | ICD-10-CM

## 2025-02-07 DIAGNOSIS — I67.82 CEREBRAL ISCHEMIA: ICD-10-CM

## 2025-02-07 PROCEDURE — 99203 OFFICE O/P NEW LOW 30 MIN: CPT | Performed by: SPECIALIST

## 2025-02-07 PROCEDURE — 3017F COLORECTAL CA SCREEN DOC REV: CPT | Performed by: SPECIALIST

## 2025-02-07 PROCEDURE — 1036F TOBACCO NON-USER: CPT | Performed by: SPECIALIST

## 2025-02-07 PROCEDURE — 3074F SYST BP LT 130 MM HG: CPT | Performed by: SPECIALIST

## 2025-02-07 PROCEDURE — 3078F DIAST BP <80 MM HG: CPT | Performed by: SPECIALIST

## 2025-02-07 PROCEDURE — 1123F ACP DISCUSS/DSCN MKR DOCD: CPT | Performed by: SPECIALIST

## 2025-02-07 PROCEDURE — G8427 DOCREV CUR MEDS BY ELIG CLIN: HCPCS | Performed by: SPECIALIST

## 2025-02-07 PROCEDURE — G8417 CALC BMI ABV UP PARAM F/U: HCPCS | Performed by: SPECIALIST

## 2025-02-07 PROCEDURE — 1159F MED LIST DOCD IN RCRD: CPT | Performed by: SPECIALIST

## 2025-02-07 NOTE — PROGRESS NOTES
[facility] - [address]  [facility] - [place]  [address] [address]  Dept: [phone]           [address]  [place] [address]  Tel.  [phone]  Fax. [phone] [address]  [place] [address]  Tel.  [phone]  Fax. [phone] [address] [place] [address]  Tel.  [phone]  Fax. [phone]       Chief Complaint       Chief Complaint   Patient presents with    Sleep Problem     NP refd by Jett Gusman MD for PAULO consult        HPI      Ricardo Pat is 69 y.o. male seen for evaluation of a sleep disorder.  Patient has a history of hypertension, recent admission for diplopia, MRI demonstrating small left parietal/occipital acute infarction.    The patient reports a past evaluation at [facility] with a study performed in 2009.  Diagnostic portion of the study not available.  Patient was started on CPAP at 16 cm.  At that setting the AHI was reported to be 17.8/h.      Patient had continued using the original device without follow-up.  Noted that the device is no longer working.      Currently retires at 10 PM and will awaken at 4: 30.  Notes some snoring.  Denies vivid dreaming nightmares, sleep talking or sleepwalking, bruxism or nocturnal incontinence, abnormal arm or leg movements, hypnagogue hallucinations, sleep paralysis or cataplexy.            2/4/2025     7:42 AM 1/20/2025     9:16 AM   Sleep Medicine   Sitting and reading 2 2   Watching TV 1 1   Sitting, inactive in a public place (e.g. a theatre or a meeting) 1 1   As a passenger in a car for an hour without a break 0 0   Lying down to rest in the afternoon when circumstances permit 2 2   Sitting and talking to someone 0 0   Sitting quietly after a lunch without alcohol 1 1   In a car, while stopped for a few minutes in traffic 0 0   [place] Sleepiness Score 7 7   Neck (Inches) 18         No Known

## 2025-02-08 ENCOUNTER — HOSPITAL ENCOUNTER (OUTPATIENT)
Dept: VASCULAR SURGERY | Facility: HOSPITAL | Age: 70
Discharge: HOME | End: 2025-02-10
Attending: RADIOLOGY
Payer: MEDICARE

## 2025-02-08 ENCOUNTER — APPOINTMENT (OUTPATIENT)
Facility: HOSPITAL | Age: 70
End: 2025-02-08
Attending: RADIOLOGY
Payer: MEDICARE

## 2025-02-08 DIAGNOSIS — I67.1 CEREBRAL ANEURYSM: ICD-10-CM

## 2025-02-08 PROCEDURE — 93880 EXTRACRANIAL BILAT STUDY: CPT

## 2025-02-09 LAB
VAS LEFT CCA DIST EDV: 16.7 CM/S
VAS LEFT CCA DIST PSV: 91.2 CM/S
VAS LEFT CCA PROX EDV: 14.5 CM/S
VAS LEFT CCA PROX PSV: 148.3 CM/S
VAS LEFT ECA EDV: 7.9 CM/S
VAS LEFT ECA PSV: 100 CM/S
VAS LEFT ICA DIST EDV: 18.9 CM/S
VAS LEFT ICA DIST PSV: 102.2 CM/S
VAS LEFT ICA MID EDV: 18.9 CM/S
VAS LEFT ICA MID PSV: 150.4 CM/S
VAS LEFT ICA PROX EDV: 27.6 CM/S
VAS LEFT ICA PROX PSV: 137.3 CM/S
VAS LEFT ICA/CCA PSV: 1.6 NO UNITS
VAS LEFT SUBCLAVIAN PROX EDV: 0 CM/S
VAS LEFT SUBCLAVIAN PROX PSV: 161.6 CM/S
VAS LEFT VERTEBRAL EDV: 9.5 CM/S
VAS LEFT VERTEBRAL PSV: 54.5 CM/S
VAS RIGHT CCA DIST EDV: 15.1 CM/S
VAS RIGHT CCA DIST PSV: 81.6 CM/S
VAS RIGHT CCA PROX EDV: 12.5 CM/S
VAS RIGHT CCA PROX PSV: 102.5 CM/S
VAS RIGHT ECA EDV: 8.4 CM/S
VAS RIGHT ECA PSV: 141.7 CM/S
VAS RIGHT ICA DIST EDV: 30.3 CM/S
VAS RIGHT ICA DIST PSV: 126.9 CM/S
VAS RIGHT ICA MID EDV: 21.2 CM/S
VAS RIGHT ICA MID PSV: 92.3 CM/S
VAS RIGHT ICA PROX EDV: 21.2 CM/S
VAS RIGHT ICA PROX PSV: 97.8 CM/S
VAS RIGHT ICA/CCA PSV: 1.6 NO UNITS
VAS RIGHT SUBCLAVIAN PROX EDV: 7 CM/S
VAS RIGHT SUBCLAVIAN PROX PSV: 131.2 CM/S
VAS RIGHT VERTEBRAL EDV: 13.9 CM/S
VAS RIGHT VERTEBRAL PSV: 70.4 CM/S

## 2025-02-11 ENCOUNTER — TELEPHONE (OUTPATIENT)
Age: 70
End: 2025-02-11

## 2025-02-11 NOTE — TELEPHONE ENCOUNTER
Patient called stating that she spoke with [facility] this morning and they advised they have not received a referral for a driving evaluation from us. Pt asked if we could please resend the referral. Please advise.

## 2025-02-11 NOTE — TELEPHONE ENCOUNTER
Faxed occupational therapy referral for driving evaluation to [facility] at [phone] and received fax confirmation. Faxed referral with last office note, insurance card information, and demographics.     Called patient and left voice message informing him that referral has been faxed and confirmation received and they should receive a call from [facility] once they review the information to schedule an appointment. Advised to call back or send GMEXhart message if any questions or concerns.

## 2025-02-13 DIAGNOSIS — I10 ESSENTIAL (PRIMARY) HYPERTENSION: ICD-10-CM

## 2025-02-13 RX ORDER — LISINOPRIL 10 MG/1
10 TABLET ORAL DAILY
Qty: 90 TABLET | Refills: 1 | Status: SHIPPED | OUTPATIENT
Start: 2025-02-13

## 2025-02-13 RX ORDER — HYDROCHLOROTHIAZIDE 12.5 MG/1
12.5 TABLET ORAL EVERY MORNING
Qty: 90 TABLET | Refills: 1 | Status: SHIPPED | OUTPATIENT
Start: 2025-02-13

## 2025-02-13 NOTE — TELEPHONE ENCOUNTER
Medication Refill Request    Ricardo Pat is requesting a refill of the following medication(s):   Requested Prescriptions     Pending Prescriptions Disp Refills    lisinopril (PRINIVIL;ZESTRIL) 10 MG tablet [Pharmacy Med Name: LISINOPRIL 10 MG TABLET] 90 tablet 1     Sig: TAKE 1 TABLET BY MOUTH DAILY NEEDS APPT FOR FURTHER REFILLS    hydroCHLOROthiazide 12.5 MG tablet [Pharmacy Med Name: HYDROCHLOROTHIAZIDE 12.5 MG TB] 90 tablet 1     Sig: TAKE 1 TABLET BY MOUTH EVERY MORNING NEEDS APPT FOR FURTHER REFILLS        Listed PCP is Agustina Morocho MD   Last provider to prescribe medication: MALLORIE  Last Date of Medication Prescribed:  08/20/2024  Date of Last Office Visit at [facility]:  02/06/2025  FUTURE APPOINTMENT:   Future Appointments   Date Time Provider Department Center   2/17/2025  8:30 PM BEDROOM 4 [facility]    5/6/2025  8:00 AM Agustina Morocho MD [facility]   12/11/2025  9:30 AM [facility]       Please send refill to:    [id number] - [place], [place] - [address] [address] - P [phone] - F [phone] [address] [address]  Phone: [phone] Fax: [phone]      Please review request and approve or deny with recommendations.

## 2025-02-17 ENCOUNTER — HOSPITAL ENCOUNTER (OUTPATIENT)
Facility: HOSPITAL | Age: 70
Discharge: HOME | End: 2025-02-20
Payer: MEDICARE

## 2025-02-17 DIAGNOSIS — G47.33 OSA (OBSTRUCTIVE SLEEP APNEA): ICD-10-CM

## 2025-02-17 PROCEDURE — 95811 POLYSOM 6/>YRS CPAP 4/> PARM: CPT | Performed by: SPECIALIST

## 2025-02-18 VITALS
DIASTOLIC BLOOD PRESSURE: 65 MMHG | HEART RATE: 77 BPM | SYSTOLIC BLOOD PRESSURE: 123 MMHG | HEIGHT: 72 IN | OXYGEN SATURATION: 96 % | BODY MASS INDEX: 31.69 KG/M2 | TEMPERATURE: 97.5 F | WEIGHT: 234 LBS

## 2025-02-24 ENCOUNTER — CLINICAL DOCUMENTATION (OUTPATIENT)
Age: 70
End: 2025-02-24

## 2025-02-24 DIAGNOSIS — G47.33 OSA (OBSTRUCTIVE SLEEP APNEA): Primary | ICD-10-CM

## 2025-02-24 NOTE — PROGRESS NOTES
Sleep study performed for potential sleep disordered breathing.  During the initial portion of the study: 201.6 minutes recorded of which 153 minutes asleep with a sleep efficiency of 75.9%.  Sleep onset at 2.8 minutes; REM sleep not observed.    78 respiratory events occurred of  which 61 hypopnea and 17 obstructive apnea.  Overall AHI 30.6/h.  Patient slept primarily right lateral.  Minimal SaO2 88%.    During the second portion of the study CPAP and BiPAP employed.  CPAP initiated at 12 cm and increased to 15 cm.  BiPAP started at 16/12 cm.  At 16/12 cm BiPAP: 35.5 minutes recorded.  REM sleep not observed.  AHI 0/h.  Minimal SaO2:93  Percent.      Vitera  (M) fullface mask    Impression: Severe sleep disordered breathing responding to BiPAP increased to 16/12 cm.  REM sleep not observed at the final BiPAP setting.    Recommendation: Auto BiPAP: EPAP 12 cm, pressure support 4 cm, IPAP 24 cm.    Sleep technologist: Please advise patient of study results.  Order has been entered for auto BiPAP.  Please schedule compliance appointment.

## 2025-02-26 ENCOUNTER — TELEMEDICINE (OUTPATIENT)
Age: 70
End: 2025-02-26
Payer: MEDICARE

## 2025-02-26 DIAGNOSIS — I67.1 CEREBRAL ANEURYSM: Primary | ICD-10-CM

## 2025-02-26 PROCEDURE — 99421 OL DIG E/M SVC 5-10 MIN: CPT | Performed by: RADIOLOGY

## 2025-02-26 NOTE — PATIENT INSTRUCTIONS
Follow up in January 2026 after MRA and Carotid Duplex imaging is completed.  See attached paperwork to schedule imaging.

## 2025-02-28 ENCOUNTER — CLINICAL DOCUMENTATION (OUTPATIENT)
Age: 70
End: 2025-02-28

## 2025-02-28 NOTE — PROGRESS NOTES
DME Order faxed to [employer], 1st adh scheduled and patient notified through mychart.    Bhavani

## 2025-03-12 ENCOUNTER — TELEPHONE (OUTPATIENT)
Age: 70
End: 2025-03-12

## 2025-03-12 NOTE — TELEPHONE ENCOUNTER
Patient had his driving test done yesterday and would like a call back to confirm if the results had been recvd. They were to fax them over yesterday. Pt can be reached at [phone]

## 2025-03-18 NOTE — TELEPHONE ENCOUNTER
Mr. Pat had a 3rd nerve palsy in the setting of acute stroke. I had stated in my hospital follow-up note (2/4/2025) if patient completed driving evaluation with [facility] and was cleared to drive he would most likely be cleared to drive from a neurological standpoint.    I had an opportunity to review the results of the driving simulation that Mr. Pat participated in (see OT note uploaded in media (3/2025)).  Patient is cleared to drive from an OT/[facility] standpoint.     I spoke with Mr. Pat today.  His 3rd nerve palsy has resolved.  He is at his neurological baseline with no symptoms to suggest stroke.  He has had no symptoms to suggest recurrent stroke or TIA.  I notified him today, from a neurological standpoint he is also cleared to drive.

## 2025-04-13 RX ORDER — ATORVASTATIN CALCIUM 40 MG/1
80 TABLET, FILM COATED ORAL DAILY
Qty: 180 TABLET | Refills: 1 | Status: SHIPPED | OUTPATIENT
Start: 2025-04-13

## 2025-04-15 NOTE — PROGRESS NOTES
Phone call to patient in preparation for Virtual/phone visit with provider.  Name and  verified.    Follow up for Cerebral aneurysm and imaging review.  Patient reports no changes since last visit.  No new problems reported.  
  aspirin 81 MG EC tablet Take 1 tablet by mouth daily 2/4/25  Yes Chani Thompson, APRN - CNP   amLODIPine (NORVASC) 10 MG tablet Take 1 tablet by mouth daily 12/10/24  Yes Agustina Morocho MD   glyBURIDE-metFORMIN (GLUCOVANCE) 5-500 MG per tablet Take 2 tablets by mouth daily (with breakfast) AND 1 tablet Daily with supper. 11/4/24  Yes Agustina Morocho MD   vitamin D 25 MCG (1000 UT) CAPS Take by mouth 2 times daily   Yes Automatic Reconciliation, Ar   atorvastatin (LIPITOR) 40 MG tablet Take 2 tablets by mouth daily 4/13/25   Agustina Morocho MD     No Known Allergies    Patient Active Problem List   Diagnosis    Type 2 diabetes mellitus with stage 3a chronic kidney disease, without long-term current use of insulin (HCC)    Nonspecific abnormal electrocardiogram (ECG) (EKG)    Essential hypertension    Mixed hyperlipidemia    Severe obesity (BMI 35.0-39.9) with comorbidity    Thrombocytopenia, unspecified    PAULO (obstructive sleep apnea)    Diplopia    History of stroke     Patient Active Problem List    Diagnosis Date Noted    History of stroke 02/04/2025    Diplopia 12/09/2024    PAULO (obstructive sleep apnea) 09/19/2024    Thrombocytopenia, unspecified 09/18/2024    Severe obesity (BMI 35.0-39.9) with comorbidity 06/20/2023    Essential hypertension 01/29/2018    Type 2 diabetes mellitus with stage 3a chronic kidney disease, without long-term current use of insulin (HCC) 03/17/2011    Nonspecific abnormal electrocardiogram (ECG) (EKG) 03/17/2011    Mixed hyperlipidemia 03/17/2011     Current Outpatient Medications   Medication Sig Dispense Refill    lisinopril (PRINIVIL;ZESTRIL) 10 MG tablet TAKE 1 TABLET BY MOUTH DAILY NEEDS APPT FOR FURTHER REFILLS 90 tablet 1    hydroCHLOROthiazide 12.5 MG tablet TAKE 1 TABLET BY MOUTH EVERY MORNING NEEDS APPT FOR FURTHER REFILLS 90 tablet 1    ticagrelor (BRILINTA) 90 MG TABS tablet Take 1 tablet by mouth 2 times daily 180 tablet 0    aspirin 81 MG EC tablet Take 1

## 2025-04-17 NOTE — PROGRESS NOTES
Neurointerventional Surgery Clinic Note        Patient: Ricardo Pat MRN: [id number]  SSN: [id number]    YOB: 1955  Age: 69 y.o.  Sex: male      Subjective:      Ricardo Pat is a 69 y.o. male who is being seen for incidentally detected anterior communicating artery complex aneurysm on workup for TIA/stroke.    Past Medical History:   Diagnosis Date    CVA (cerebral vascular accident) (HCC)     Hypercholesterolemia     Hypertension     Nonspecific abnormal electrocardiogram (ECG) (EKG) 3/17/2011    Other dyspnea and respiratory abnormality 3/17/2011    Pre-operative cardiovascular examination 3/17/2011    Type II or unspecified type diabetes mellitus without mention of complication, not stated as uncontrolled 3/17/2011    Vision problems      Past Surgical History:   Procedure Laterality Date    HERNIA REPAIR        Family History   Problem Relation Age of Onset    Stroke Mother     Elevated Lipids Father     Dementia Father     Hypertension Father      Social History     Tobacco Use    Smoking status: Former     Passive exposure: Never    Smokeless tobacco: Never   Substance Use Topics    Alcohol use: Not Currently      Current Outpatient Medications   Medication Sig Dispense Refill    amLODIPine (NORVASC) 10 MG tablet Take 1 tablet by mouth daily 90 tablet 3    glyBURIDE-metFORMIN (GLUCOVANCE) 5-500 MG per tablet Take 2 tablets by mouth daily (with breakfast) AND 1 tablet Daily with supper. 270 tablet 1    vitamin D 25 MCG (1000 UT) CAPS Take by mouth 2 times daily      atorvastatin (LIPITOR) 40 MG tablet Take 2 tablets by mouth daily 180 tablet 1    lisinopril (PRINIVIL;ZESTRIL) 10 MG tablet TAKE 1 TABLET BY MOUTH DAILY NEEDS APPT FOR FURTHER REFILLS 90 tablet 1    hydroCHLOROthiazide 12.5 MG tablet TAKE 1 TABLET BY MOUTH EVERY MORNING NEEDS APPT FOR FURTHER REFILLS 90 tablet 1    ticagrelor (BRILINTA) 90 MG TABS tablet Take 1 tablet by mouth 2 times daily 180 tablet 0    aspirin 81 MG EC tablet

## 2025-04-25 RX ORDER — GLYBURIDE-METFORMIN HYDROCHLORIDE 5; 500 MG/1; MG/1
TABLET ORAL
Qty: 270 TABLET | Refills: 1 | Status: SHIPPED | OUTPATIENT
Start: 2025-04-25

## 2025-04-25 NOTE — TELEPHONE ENCOUNTER
Medication Refill Request    Ricardo Pat is requesting a refill of the following medication(s):   Requested Prescriptions     Pending Prescriptions Disp Refills    glyBURIDE-metFORMIN (GLUCOVANCE) 5-500 MG per tablet [Pharmacy Med Name: GLYBURIDE-METFORMIN 5-500 MG] 270 tablet 1     Sig: TAKE 2 TABLETS BY MOUTH DAILY (WITH BREAKFAST) AND 1 TABLET DAILY WITH SUPPER.        Listed PCP is Agustina Morocho MD    Date of Last Office Visit at [facility]:  02/06/2025  FUTURE APPOINTMENT:   Future Appointments   Date Time Provider Department Center   5/6/2025  8:00 AM Agustina Morocho MD [facility]   5/15/2025 10:00 AM Enrrique Boyle MD [facility]   12/11/2025  9:30 AM [facility] MRI 1 [facility]       Please send refill to:    [facility]/pharmacy #[id number] - [place] - [address] - P [phone] - F [phone] [address] [address] [address]  Phone: [phone] Fax: [phone]      Please review request and approve or deny with recommendations.

## 2025-04-30 NOTE — PROGRESS NOTES
[address] [place] [address]   Office [phone], Fax [phone]      Chief Complaint:     Chief Complaint   Patient presents with    Follow-up Chronic Condition       Ricardo Pat is a 69 y.o. male that presents for: FU      Subjective:   HPI:  Ricardo Pat is a 69 y.o. male that presents for:    Follow up:  HTN: Chronic, home lisinopril 10 mg qd, amlodipine 10 mg qd, HCTZ 12.5 mg qd  No CP, palp.      T2DM: Chronic. Home glyburide-metformin  mg BID. A1c 6.6>6.1.     CVA: .   -cont follow up w neuro   - atorvastatin 80 mg qd Brillanta and ASA   -diplopia has resolved     Would like R ear wax removed today. No pain.     Past medical history, social history, medications, and allergies personally reviewed.  Past Medical History:   Diagnosis Date    CVA (cerebral vascular accident) (HCC)     Hypercholesterolemia     Hypertension     Nonspecific abnormal electrocardiogram (ECG) (EKG) 3/17/2011    Other dyspnea and respiratory abnormality 3/17/2011    Pre-operative cardiovascular examination 3/17/2011    Type II or unspecified type diabetes mellitus without mention of complication, not stated as uncontrolled 3/17/2011    Vision problems           Medications:   Current Outpatient Medications   Medication Sig    lisinopril (PRINIVIL;ZESTRIL) 10 MG tablet Take 1 tablet by mouth daily NEEDS APPT FOR FURTHER REFILLS    hydroCHLOROthiazide 12.5 MG tablet Take 1 tablet by mouth every morning NEEDS APPT FOR FURTHER REFILLS    metFORMIN (GLUCOPHAGE-XR) 500 MG extended release tablet Take 1 tablet by mouth daily (with breakfast)    glyBURIDE-metFORMIN (GLUCOVANCE) 5-500 MG per tablet TAKE 2 TABLETS BY MOUTH DAILY (WITH BREAKFAST) AND 1 TABLET DAILY WITH SUPPER.    atorvastatin (LIPITOR) 40 MG tablet Take 2 tablets by mouth daily    ticagrelor (BRILINTA) 90 MG TABS tablet Take 1 tablet by mouth 2 times daily    aspirin 81 MG EC tablet Take 1 tablet by mouth daily    amLODIPine (NORVASC) 10 MG tablet

## 2025-05-06 ENCOUNTER — OFFICE VISIT (OUTPATIENT)
Age: 70
End: 2025-05-06
Payer: MEDICARE

## 2025-05-06 VITALS
TEMPERATURE: 98.1 F | SYSTOLIC BLOOD PRESSURE: 123 MMHG | RESPIRATION RATE: 18 BRPM | HEIGHT: 72 IN | WEIGHT: 237 LBS | OXYGEN SATURATION: 96 % | BODY MASS INDEX: 32.1 KG/M2 | HEART RATE: 60 BPM | DIASTOLIC BLOOD PRESSURE: 62 MMHG

## 2025-05-06 DIAGNOSIS — I10 ESSENTIAL HYPERTENSION: Primary | ICD-10-CM

## 2025-05-06 DIAGNOSIS — I10 ESSENTIAL (PRIMARY) HYPERTENSION: ICD-10-CM

## 2025-05-06 DIAGNOSIS — N18.31 TYPE 2 DIABETES MELLITUS WITH STAGE 3A CHRONIC KIDNEY DISEASE, WITHOUT LONG-TERM CURRENT USE OF INSULIN (HCC): ICD-10-CM

## 2025-05-06 DIAGNOSIS — Z86.73 HISTORY OF STROKE: ICD-10-CM

## 2025-05-06 DIAGNOSIS — G47.33 OSA (OBSTRUCTIVE SLEEP APNEA): ICD-10-CM

## 2025-05-06 DIAGNOSIS — E11.22 TYPE 2 DIABETES MELLITUS WITH STAGE 3A CHRONIC KIDNEY DISEASE, WITHOUT LONG-TERM CURRENT USE OF INSULIN (HCC): ICD-10-CM

## 2025-05-06 DIAGNOSIS — E78.2 MIXED HYPERLIPIDEMIA: ICD-10-CM

## 2025-05-06 LAB
ANION GAP SERPL CALC-SCNC: 8 MMOL/L (ref 2–12)
BUN SERPL-MCNC: 25 MG/DL (ref 6–20)
BUN/CREAT SERPL: 20 (ref 12–20)
CALCIUM SERPL-MCNC: 9.7 MG/DL (ref 8.5–10.1)
CHLORIDE SERPL-SCNC: 101 MMOL/L (ref 97–108)
CHOLEST SERPL-MCNC: 143 MG/DL
CO2 SERPL-SCNC: 24 MMOL/L (ref 21–32)
CREAT SERPL-MCNC: 1.26 MG/DL (ref 0.7–1.3)
CREAT UR-MCNC: 69.6 MG/DL
ERYTHROCYTE [DISTWIDTH] IN BLOOD BY AUTOMATED COUNT: 13 % (ref 11.5–14.5)
GLUCOSE SERPL-MCNC: 141 MG/DL (ref 65–100)
HBA1C MFR BLD: 6.1 %
HCT VFR BLD AUTO: 42.9 % (ref 36.6–50.3)
HDLC SERPL-MCNC: 49 MG/DL
HDLC SERPL: 2.9 (ref 0–5)
HGB BLD-MCNC: 14.2 G/DL (ref 12.1–17)
LDLC SERPL CALC-MCNC: 70.2 MG/DL (ref 0–100)
MCH RBC QN AUTO: 29.3 PG (ref 26–34)
MCHC RBC AUTO-ENTMCNC: 33.1 G/DL (ref 30–36.5)
MCV RBC AUTO: 88.5 FL (ref 80–99)
MICROALBUMIN UR-MCNC: <0.5 MG/DL
MICROALBUMIN/CREAT UR-RTO: <7 MG/G (ref 0–30)
NRBC # BLD: 0 K/UL (ref 0–0.01)
NRBC BLD-RTO: 0 PER 100 WBC
PLATELET # BLD AUTO: 162 K/UL (ref 150–400)
PMV BLD AUTO: 10.3 FL (ref 8.9–12.9)
POTASSIUM SERPL-SCNC: 4.1 MMOL/L (ref 3.5–5.1)
RBC # BLD AUTO: 4.85 M/UL (ref 4.1–5.7)
SODIUM SERPL-SCNC: 133 MMOL/L (ref 136–145)
TRIGL SERPL-MCNC: 119 MG/DL
VLDLC SERPL CALC-MCNC: 23.8 MG/DL
WBC # BLD AUTO: 7.6 K/UL (ref 4.1–11.1)

## 2025-05-06 PROCEDURE — 3074F SYST BP LT 130 MM HG: CPT

## 2025-05-06 PROCEDURE — 2022F DILAT RTA XM EVC RTNOPTHY: CPT

## 2025-05-06 PROCEDURE — 99213 OFFICE O/P EST LOW 20 MIN: CPT

## 2025-05-06 PROCEDURE — G8427 DOCREV CUR MEDS BY ELIG CLIN: HCPCS

## 2025-05-06 PROCEDURE — 3078F DIAST BP <80 MM HG: CPT

## 2025-05-06 PROCEDURE — G8417 CALC BMI ABV UP PARAM F/U: HCPCS

## 2025-05-06 PROCEDURE — 83036 HEMOGLOBIN GLYCOSYLATED A1C: CPT

## 2025-05-06 PROCEDURE — 1159F MED LIST DOCD IN RCRD: CPT

## 2025-05-06 PROCEDURE — 1036F TOBACCO NON-USER: CPT

## 2025-05-06 PROCEDURE — 3017F COLORECTAL CA SCREEN DOC REV: CPT

## 2025-05-06 PROCEDURE — 3044F HG A1C LEVEL LT 7.0%: CPT

## 2025-05-06 PROCEDURE — 1123F ACP DISCUSS/DSCN MKR DOCD: CPT

## 2025-05-06 PROCEDURE — 3046F HEMOGLOBIN A1C LEVEL >9.0%: CPT

## 2025-05-06 PROCEDURE — PBSHW AMB POC HEMOGLOBIN A1C

## 2025-05-06 RX ORDER — METFORMIN HYDROCHLORIDE 500 MG/1
500 TABLET, EXTENDED RELEASE ORAL
Qty: 30 TABLET | Refills: 0 | Status: SHIPPED | OUTPATIENT
Start: 2025-05-06

## 2025-05-06 RX ORDER — LISINOPRIL 10 MG/1
10 TABLET ORAL DAILY
Qty: 90 TABLET | Refills: 1 | Status: SHIPPED | OUTPATIENT
Start: 2025-05-06

## 2025-05-06 RX ORDER — HYDROCHLOROTHIAZIDE 12.5 MG/1
12.5 TABLET ORAL EVERY MORNING
Qty: 90 TABLET | Refills: 1 | Status: SHIPPED | OUTPATIENT
Start: 2025-05-06

## 2025-05-06 ASSESSMENT — PATIENT HEALTH QUESTIONNAIRE - PHQ9
SUM OF ALL RESPONSES TO PHQ QUESTIONS 1-9: 0
2. FEELING DOWN, DEPRESSED OR HOPELESS: NOT AT ALL
1. LITTLE INTEREST OR PLEASURE IN DOING THINGS: NOT AT ALL

## 2025-05-06 NOTE — PROGRESS NOTES
Room 20    Patient is accompanied by self. I have received verbal consent from Ricardo Pat to discuss any/all medical information while they are present in the room.    Identified pt with two pt identifiers(name and ). Reviewed record in preparation for visit and have obtained necessary documentation.  Chief Complaint   Patient presents with    Follow-up Chronic Condition        Ear clogged up - Right     Health Maintenance Due   Topic    Colorectal Cancer Screen     Pneumococcal 50+ years Vaccine (2 of 2 - PCV)    AAA screen     Diabetic foot exam     COVID-19 Vaccine ( season)       Vitals:    25 0802   BP: 123/62   BP Site: Left Upper Arm   Patient Position: Sitting   BP Cuff Size: Large Adult   Pulse: 60   Resp: 18   Temp: 98.1 °F (36.7 °C)   TempSrc: Temporal   SpO2: 96%   Weight: 107.5 kg (237 lb)   Height: 1.829 m (6')       Social Determinants Of Health:       SDOH screening completed at visit.  Resources Declined.   See AVS for attached resources, if requested.    Coordination of Care Questionnaire:       \"Have you been to the ER, urgent care clinic since your last visit?  Hospitalized since your last visit?\"    NO    “Have you seen or consulted any other health care providers outside of [facility] since your last visit?”    NO      “Have you had a colorectal cancer screening such as a colonoscopy/FIT/Cologuard?    NO    No colonoscopy on file  No cologuard on file  No FIT/FOBT on file   No flexible sigmoidoscopy on file         Click Here for Release of Records Request

## 2025-05-07 ENCOUNTER — RESULTS FOLLOW-UP (OUTPATIENT)
Age: 70
End: 2025-05-07

## 2025-05-12 ASSESSMENT — SLEEP AND FATIGUE QUESTIONNAIRES
HOW LIKELY ARE YOU TO NOD OFF OR FALL ASLEEP WHILE SITTING AND READING: SLIGHT CHANCE OF DOZING
DO YOU HAVE DIFFICULTY BEING AS ACTIVE AS YOU WANT TO BE IN THE EVENING BECAUSE YOU ARE SLEEPY OR TIRED: NO
HAS YOUR RELATIONSHIP WITH FAMILY, FRIENDS OR WORK COLLEAGUES BEEN AFFECTED BECAUSE YOU ARE SLEEPY OR TIRED: NO
HOW LIKELY ARE YOU TO NOD OFF OR FALL ASLEEP WHEN YOU ARE A PASSENGER IN A CAR FOR AN HOUR WITHOUT A BREAK: SLIGHT CHANCE OF DOZING
HOW LIKELY ARE YOU TO NOD OFF OR FALL ASLEEP WHILE SITTING AND READING: SLIGHT CHANCE OF DOZING
HOW LIKELY ARE YOU TO NOD OFF OR FALL ASLEEP WHILE LYING DOWN TO REST IN THE AFTERNOON WHEN CIRCUMSTANCES PERMIT: MODERATE CHANCE OF DOZING
HOW LIKELY ARE YOU TO NOD OFF OR FALL ASLEEP WHILE SITTING INACTIVE IN A PUBLIC PLACE: WOULD NEVER DOZE
HAS YOUR MOOD BEEN AFFECTED BECAUSE YOU ARE SLEEPY OR TIRED: NO
HOW LIKELY ARE YOU TO NOD OFF OR FALL ASLEEP WHILE SITTING QUIETLY AFTER LUNCH WITHOUT ALCOHOL: SLIGHT CHANCE OF DOZING
HOW LIKELY ARE YOU TO NOD OFF OR FALL ASLEEP WHILE SITTING QUIETLY AFTER LUNCH WITHOUT ALCOHOL: SLIGHT CHANCE OF DOZING
DO YOU HAVE DIFFICULTY OPERATING A MOTOR VEHICLE FOR SHORT DISTANCES (LESS THAN 100 MILES) BECAUSE YOU BECOME SLEEPY: NO
DO YOU HAVE DIFFICULTY BEING AS ACTIVE AS YOU WANT TO BE IN THE MORNING BECAUSE YOU ARE SLEEPY OR TIRED: NO
HOW LIKELY ARE YOU TO NOD OFF OR FALL ASLEEP WHILE LYING DOWN TO REST IN THE AFTERNOON WHEN CIRCUMSTANCES PERMIT: MODERATE CHANCE OF DOZING
DO YOU HAVE DIFFICULTY CONCENTRATING ON THE THINGS YOU DO BECAUSE YOU ARE SLEEPY OR TIRED: NO
HOW LIKELY ARE YOU TO NOD OFF OR FALL ASLEEP WHILE SITTING INACTIVE IN A PUBLIC PLACE: WOULD NEVER DOZE
DO YOU HAVE DIFFICULTY OPERATING A MOTOR VEHICLE FOR LONG DISTANCES (GREATER THAN 100 MILES) BECAUSE YOU BECOME SLEEPY: NO
DO YOU HAVE DIFFICULTY WATCHING A MOVIE OR VIDEO BECAUSE YOU BECOME SLEEPY OR TIRED: NO
HOW LIKELY ARE YOU TO NOD OFF OR FALL ASLEEP WHILE WATCHING TV: SLIGHT CHANCE OF DOZING
HOW LIKELY ARE YOU TO NOD OFF OR FALL ASLEEP WHILE SITTING AND TALKING TO SOMEONE: WOULD NEVER DOZE
HOW LIKELY ARE YOU TO NOD OFF OR FALL ASLEEP WHILE WATCHING TV: SLIGHT CHANCE OF DOZING
FOSQ SCORE: 20
ESS TOTAL SCORE: 6
DO YOU GENERALLY HAVE DIFFICULTY REMEMBERING THINGS BECAUSE YOU ARE SLEEPY OR TIRED: NO
HOW LIKELY ARE YOU TO NOD OFF OR FALL ASLEEP IN A CAR, WHILE STOPPED FOR A FEW MINUTES IN TRAFFIC: WOULD NEVER DOZE
DO YOU HAVE DIFFICULTY VISITING YOUR FAMILY OR FRIENDS IN THEIR HOME BECAUSE YOU BECOME SLEEPY OR TIRED: NO
HOW LIKELY ARE YOU TO NOD OFF OR FALL ASLEEP WHEN YOU ARE A PASSENGER IN A CAR FOR AN HOUR WITHOUT A BREAK: SLIGHT CHANCE OF DOZING
HOW LIKELY ARE YOU TO NOD OFF OR FALL ASLEEP IN A CAR, WHILE STOPPED FOR A FEW MINUTES IN TRAFFIC: WOULD NEVER DOZE
HOW LIKELY ARE YOU TO NOD OFF OR FALL ASLEEP WHILE SITTING AND TALKING TO SOMEONE: WOULD NEVER DOZE

## 2025-05-15 ENCOUNTER — OFFICE VISIT (OUTPATIENT)
Age: 70
End: 2025-05-15
Payer: MEDICARE

## 2025-05-15 ENCOUNTER — CLINICAL DOCUMENTATION (OUTPATIENT)
Age: 70
End: 2025-05-15

## 2025-05-15 VITALS
BODY MASS INDEX: 32.34 KG/M2 | DIASTOLIC BLOOD PRESSURE: 63 MMHG | HEART RATE: 73 BPM | OXYGEN SATURATION: 93 % | HEIGHT: 72 IN | SYSTOLIC BLOOD PRESSURE: 114 MMHG | TEMPERATURE: 97.4 F | WEIGHT: 238.8 LBS

## 2025-05-15 DIAGNOSIS — G47.33 OSA (OBSTRUCTIVE SLEEP APNEA): Primary | ICD-10-CM

## 2025-05-15 PROCEDURE — G8427 DOCREV CUR MEDS BY ELIG CLIN: HCPCS | Performed by: SPECIALIST

## 2025-05-15 PROCEDURE — 3078F DIAST BP <80 MM HG: CPT | Performed by: SPECIALIST

## 2025-05-15 PROCEDURE — 1123F ACP DISCUSS/DSCN MKR DOCD: CPT | Performed by: SPECIALIST

## 2025-05-15 PROCEDURE — 1159F MED LIST DOCD IN RCRD: CPT | Performed by: SPECIALIST

## 2025-05-15 PROCEDURE — G8417 CALC BMI ABV UP PARAM F/U: HCPCS | Performed by: SPECIALIST

## 2025-05-15 PROCEDURE — 3074F SYST BP LT 130 MM HG: CPT | Performed by: SPECIALIST

## 2025-05-15 PROCEDURE — 1036F TOBACCO NON-USER: CPT | Performed by: SPECIALIST

## 2025-05-15 PROCEDURE — 3017F COLORECTAL CA SCREEN DOC REV: CPT | Performed by: SPECIALIST

## 2025-05-15 PROCEDURE — 99213 OFFICE O/P EST LOW 20 MIN: CPT | Performed by: SPECIALIST

## 2025-05-15 NOTE — PROGRESS NOTES
[facility]  [facility] - [place]  [address] [address] [address]  Dept: [phone]              [address]  [place] [address]  Tel.  [phone]  Fax. [phone] [address]  [place] [address]  Tel.  [phone]  Fax. [phone] [address] [address]  Tel.  [phone]  Fax. [phone]         Chief Complaint       Chief Complaint   Patient presents with    Sleep Problem     1st adherence          HPI        Ricardo Pat is a 69 y.o. male seen for follow-up.  He was evaluated with a sleep study demonstrating severe sleep disordered breathing characterized by an overall AHI of 30.6/h.  Minimal SaO2 88%.    During the second portion of the study CPAP and BiPAP employed.  CPAP initiated at 12 cm and increased to 15 cm.  BiPAP started at 16/12 cm.  At 16/12 cm BiPAP: 35.5 minutes recorded.  REM sleep not observed.  AHI 0/h.  Minimal SaO2:93  Percent.       Vitera  (M) fullface mask    Recommendation: Auto BiPAP: EPAP 12 cm, pressure support 4 cm, IPAP 24 cm.    Compliance data downloaded and reviewed in detail with the patient today. During the past 30 days, auto BiPAP used during 30 days with the average daily use of 5.95 hours. CMS compliance criteria 100%. AHI 1.7 per hour.     He is doing well without significant nocturnal awakening, nonrestorative sleep or excessive daytime sleepiness.  [place] Sleepiness Scale: 6    No Known Allergies    No current facility-administered medications for this visit.     He  has a past medical history of CVA (cerebral vascular accident) (HCC), Hypercholesterolemia, Hypertension, Nonspecific abnormal electrocardiogram (ECG) (EKG), Other dyspnea and respiratory abnormality, Pre-operative cardiovascular examination, Type II or unspecified type diabetes mellitus without mention of complication, not stated as uncontrolled, and Vision problems.    He

## 2025-05-15 NOTE — PROGRESS NOTES
PAP supply order sent to [facility] [employer]

## 2025-05-30 ENCOUNTER — TELEPHONE (OUTPATIENT)
Age: 70
End: 2025-05-30

## 2025-05-30 RX ORDER — METFORMIN HYDROCHLORIDE 500 MG/1
500 TABLET, EXTENDED RELEASE ORAL
Qty: 90 TABLET | Refills: 1 | OUTPATIENT
Start: 2025-05-30

## 2025-06-04 ENCOUNTER — CLINICAL DOCUMENTATION (OUTPATIENT)
Age: 70
End: 2025-06-04

## 2025-06-04 NOTE — PROGRESS NOTES
Faxed anticoagulation recommendation form signed by provider back to [facility] at [phone]. Received fax confirmation. Sent form to  for scanning.

## 2025-07-02 RX ORDER — ATORVASTATIN CALCIUM 40 MG/1
40 TABLET, FILM COATED ORAL DAILY
Qty: 90 TABLET | Refills: 2 | Status: SHIPPED | OUTPATIENT
Start: 2025-07-02

## 2025-07-02 NOTE — TELEPHONE ENCOUNTER
Medication Refill Request    Ricardo Pat is requesting a refill of the following medication(s):   Requested Prescriptions     Pending Prescriptions Disp Refills    atorvastatin (LIPITOR) 40 MG tablet [Pharmacy Med Name: ATORVASTATIN 40 MG TABLET] 90 tablet 2     Sig: TAKE 1 TABLET BY MOUTH EVERY DAY        Listed PCP is Agustina Morocho MD   Last provider to prescribe medication: Dr. Morocho  Last Date of Medication Prescribed: 04/13/25   Date of Last Office Visit at [facility]: 05/06/25   FUTURE APPOINTMENT:   Future Appointments   Date Time Provider Department Center   12/11/2025  9:30 AM [facility]   5/19/2026 10:00 AM Enrrique Boyle MD [facility]       Please send refill to:    [facility]/pharmacy #[id number] - [place] - [address] - P [phone] - F [phone] [address] [address] [address]  Phone: [phone] Fax: [phone]      Please review request and approve or deny with recommendations.

## 2025-07-15 ENCOUNTER — OFFICE VISIT (OUTPATIENT)
Age: 70
End: 2025-07-15
Payer: MEDICARE

## 2025-07-15 VITALS
DIASTOLIC BLOOD PRESSURE: 62 MMHG | BODY MASS INDEX: 32.51 KG/M2 | HEART RATE: 61 BPM | HEIGHT: 72 IN | OXYGEN SATURATION: 94 % | SYSTOLIC BLOOD PRESSURE: 125 MMHG | RESPIRATION RATE: 18 BRPM | TEMPERATURE: 97.9 F | WEIGHT: 240 LBS

## 2025-07-15 DIAGNOSIS — R22.31 LOCALIZED SWELLING, MASS, OR LUMP OF RIGHT UPPER EXTREMITY: Primary | ICD-10-CM

## 2025-07-15 PROCEDURE — 99213 OFFICE O/P EST LOW 20 MIN: CPT

## 2025-07-15 PROCEDURE — 1126F AMNT PAIN NOTED NONE PRSNT: CPT

## 2025-07-15 PROCEDURE — 3017F COLORECTAL CA SCREEN DOC REV: CPT

## 2025-07-15 PROCEDURE — G8417 CALC BMI ABV UP PARAM F/U: HCPCS

## 2025-07-15 PROCEDURE — G8427 DOCREV CUR MEDS BY ELIG CLIN: HCPCS

## 2025-07-15 PROCEDURE — 1036F TOBACCO NON-USER: CPT

## 2025-07-15 PROCEDURE — 1159F MED LIST DOCD IN RCRD: CPT

## 2025-07-15 PROCEDURE — 1123F ACP DISCUSS/DSCN MKR DOCD: CPT

## 2025-07-15 PROCEDURE — 3074F SYST BP LT 130 MM HG: CPT

## 2025-07-15 PROCEDURE — 3078F DIAST BP <80 MM HG: CPT

## 2025-07-15 NOTE — PROGRESS NOTES
Identified pt with two pt identifiers(name and ). Reviewed record in preparation for visit and have obtained necessary documentation.  Chief Complaint   Patient presents with    Mass     Pt reports with a lump on R fore arm for a couple weeks, bruise around        Health Maintenance Due   Topic    Colorectal Cancer Screen     Pneumococcal 50+ years Vaccine (2 of 2 - PCV)    AAA screen     Diabetic foot exam     COVID-19 Vaccine ( season)       Vitals:    07/15/25 0839   BP: 125/62   BP Site: Left Upper Arm   Patient Position: Sitting   BP Cuff Size: Large Adult   Pulse: 61   Resp: 18   Temp: 97.9 °F (36.6 °C)   TempSrc: Oral   SpO2: 94%   Weight: 108.9 kg (240 lb)   Height: 1.829 m (6')         \"Have you been to the ER, urgent care clinic since your last visit?  Hospitalized since your last visit?\"    NO    “Have you seen or consulted any other health care providers outside of [facility] since your last visit?”    NO      “Have you had a colorectal cancer screening such as a colonoscopy/FIT/Cologuard?    NO    No colonoscopy on file  No cologuard on file  No FIT/FOBT on file   No flexible sigmoidoscopy on file         Click Here for Release of Records Request     This patient is accompanied in the office by his spouse.  I have received verbal consent from Ricardo Pat to discuss any/all medical information while they are present in the room.

## 2025-07-15 NOTE — PROGRESS NOTES
I reviewed with the resident the medical history and the resident's findings on the physical examination.  I discussed with the resident the patient's diagnosis and concur with the plan.     Aby Rose MD 7/15/2025

## 2025-07-15 NOTE — PROGRESS NOTES
[address] [place] [address]   Office [phone], Fax [phone]      Chief Complaint:     Chief Complaint   Patient presents with    Mass     Pt reports with a lump on R fore arm for a couple weeks, bruise around       Ricardo Pat is a 69 y.o. male that presents for: Acute visit      Subjective:   HPI:  Ricardo Pat is a 69 y.o. male that presents for:    Lump in Right forearm  Onset 1 week ago- no trauma.   Not painful.  Not itchy.  No previous similar bumps- did once have a ?lipoma on jawline years ago.   No fever/night sweats/chills.  No weight loss.      Past medical history, social history, medications, and allergies personally reviewed.  Past Medical History:   Diagnosis Date    CVA (cerebral vascular accident) (HCC)     Hypercholesterolemia     Hypertension     Nonspecific abnormal electrocardiogram (ECG) (EKG) 3/17/2011    Other dyspnea and respiratory abnormality 3/17/2011    Pre-operative cardiovascular examination 3/17/2011    Type II or unspecified type diabetes mellitus without mention of complication, not stated as uncontrolled 3/17/2011    Vision problems           Medications:   Current Outpatient Medications   Medication Sig    atorvastatin (LIPITOR) 40 MG tablet TAKE 1 TABLET BY MOUTH EVERY DAY (Patient taking differently: Take 1 tablet by mouth daily Pt taking 2 tabs every morning)    lisinopril (PRINIVIL;ZESTRIL) 10 MG tablet Take 1 tablet by mouth daily NEEDS APPT FOR FURTHER REFILLS    hydroCHLOROthiazide 12.5 MG tablet Take 1 tablet by mouth every morning NEEDS APPT FOR FURTHER REFILLS    glyBURIDE-metFORMIN (GLUCOVANCE) 5-500 MG per tablet TAKE 2 TABLETS BY MOUTH DAILY (WITH BREAKFAST) AND 1 TABLET DAILY WITH SUPPER.    ticagrelor (BRILINTA) 90 MG TABS tablet Take 1 tablet by mouth 2 times daily    aspirin 81 MG EC tablet Take 1 tablet by mouth daily    amLODIPine (NORVASC) 10 MG tablet Take 1 tablet by mouth daily    vitamin D 25 MCG (1000 UT) CAPS Take by mouth 2

## 2025-07-18 ENCOUNTER — HOSPITAL ENCOUNTER (OUTPATIENT)
Dept: VASCULAR SURGERY | Facility: HOSPITAL | Age: 70
Discharge: HOME | End: 2025-07-20
Payer: MEDICARE

## 2025-07-18 DIAGNOSIS — R22.31 LOCALIZED SWELLING, MASS, OR LUMP OF RIGHT UPPER EXTREMITY: ICD-10-CM

## 2025-07-18 PROCEDURE — 93971 EXTREMITY STUDY: CPT

## 2025-08-02 DIAGNOSIS — I63.89 CEREBROVASCULAR ACCIDENT (CVA) DUE TO OTHER MECHANISM (HCC): ICD-10-CM

## 2025-08-04 RX ORDER — TICAGRELOR 90 MG/1
90 TABLET ORAL 2 TIMES DAILY
Qty: 180 TABLET | Refills: 0 | Status: SHIPPED | OUTPATIENT
Start: 2025-08-04

## 2025-08-15 ENCOUNTER — OFFICE VISIT (OUTPATIENT)
Age: 70
End: 2025-08-15
Payer: MEDICARE

## 2025-08-15 VITALS
WEIGHT: 246.6 LBS | OXYGEN SATURATION: 97 % | HEART RATE: 61 BPM | BODY MASS INDEX: 33.4 KG/M2 | HEIGHT: 72 IN | TEMPERATURE: 97.5 F | SYSTOLIC BLOOD PRESSURE: 102 MMHG | RESPIRATION RATE: 18 BRPM | DIASTOLIC BLOOD PRESSURE: 78 MMHG

## 2025-08-15 DIAGNOSIS — R22.31 LOCALIZED SWELLING, MASS, OR LUMP OF RIGHT UPPER EXTREMITY: Primary | ICD-10-CM

## 2025-08-15 PROCEDURE — 99213 OFFICE O/P EST LOW 20 MIN: CPT

## 2025-08-15 ASSESSMENT — PATIENT HEALTH QUESTIONNAIRE - PHQ9
SUM OF ALL RESPONSES TO PHQ QUESTIONS 1-9: 0
1. LITTLE INTEREST OR PLEASURE IN DOING THINGS: NOT AT ALL
2. FEELING DOWN, DEPRESSED OR HOPELESS: NOT AT ALL
SUM OF ALL RESPONSES TO PHQ QUESTIONS 1-9: 0